# Patient Record
Sex: FEMALE | Race: WHITE | Employment: OTHER | URBAN - METROPOLITAN AREA
[De-identification: names, ages, dates, MRNs, and addresses within clinical notes are randomized per-mention and may not be internally consistent; named-entity substitution may affect disease eponyms.]

---

## 2017-05-11 ENCOUNTER — HOSPITAL ENCOUNTER (OUTPATIENT)
Age: 72
Discharge: HOME | End: 2017-05-11
Payer: MEDICARE

## 2017-05-11 ENCOUNTER — HOSPITAL ENCOUNTER (OUTPATIENT)
Dept: CT IMAGING | Age: 72
Discharge: HOME | End: 2017-05-11
Payer: MEDICARE

## 2017-05-11 DIAGNOSIS — R10.30 LOWER ABDOMINAL PAIN: ICD-10-CM

## 2017-05-11 LAB
BUN BLDV-MCNC: 20 MG/DL (ref 8–23)
CREAT SERPL-MCNC: 1.1 MG/DL (ref 0.5–0.9)
GFR AFRICAN AMERICAN: 59 ML/MIN
GFR NON-AFRICAN AMERICAN: 49 ML/MIN
GFR SERPL CREATININE-BSD FRML MDRD: ABNORMAL ML/MIN/{1.73_M2}
GFR SERPL CREATININE-BSD FRML MDRD: ABNORMAL ML/MIN/{1.73_M2}
HCT VFR BLD CALC: 34.1 % (ref 36–46)
HEMOGLOBIN: 11.1 G/DL (ref 12–16)
MCH RBC QN AUTO: 29 PG (ref 26–34)
MCHC RBC AUTO-ENTMCNC: 32.6 G/DL (ref 31–37)
MCV RBC AUTO: 89.2 FL (ref 80–100)
PDW BLD-RTO: 14.3 % (ref 11.5–14.9)
PLATELET # BLD: 216 K/UL (ref 150–450)
PMV BLD AUTO: 8 FL (ref 6–12)
RBC # BLD: 3.82 M/UL (ref 4–5.2)
WBC # BLD: 7.8 K/UL (ref 3.5–11)

## 2017-05-11 PROCEDURE — 74177 CT ABD & PELVIS W/CONTRAST: CPT

## 2017-05-11 PROCEDURE — 36415 COLL VENOUS BLD VENIPUNCTURE: CPT

## 2017-05-11 PROCEDURE — 84520 ASSAY OF UREA NITROGEN: CPT

## 2017-05-11 PROCEDURE — 82565 ASSAY OF CREATININE: CPT

## 2017-05-11 PROCEDURE — 2580000003 HC RX 258: Performed by: FAMILY MEDICINE

## 2017-05-11 PROCEDURE — 85027 COMPLETE CBC AUTOMATED: CPT

## 2017-05-11 PROCEDURE — 6360000004 HC RX CONTRAST MEDICATION: Performed by: FAMILY MEDICINE

## 2017-05-11 RX ORDER — 0.9 % SODIUM CHLORIDE 0.9 %
100 INTRAVENOUS SOLUTION INTRAVENOUS ONCE
Status: COMPLETED | OUTPATIENT
Start: 2017-05-11 | End: 2017-05-11

## 2017-05-11 RX ORDER — SODIUM CHLORIDE 0.9 % (FLUSH) 0.9 %
10 SYRINGE (ML) INJECTION PRN
Status: DISCONTINUED | OUTPATIENT
Start: 2017-05-11 | End: 2017-05-14 | Stop reason: HOSPADM

## 2017-05-11 RX ADMIN — IOHEXOL 50 ML: 240 INJECTION, SOLUTION INTRATHECAL; INTRAVASCULAR; INTRAVENOUS; ORAL at 09:50

## 2017-05-11 RX ADMIN — SODIUM CHLORIDE 100 ML: 9 INJECTION, SOLUTION INTRAVENOUS at 09:50

## 2017-05-11 RX ADMIN — Medication 10 ML: at 09:50

## 2017-05-11 RX ADMIN — IOVERSOL 130 ML: 741 INJECTION INTRA-ARTERIAL; INTRAVENOUS at 09:50

## 2017-05-26 ENCOUNTER — HOSPITAL ENCOUNTER (OUTPATIENT)
Dept: PAIN MANAGEMENT | Age: 72
Discharge: HOME | End: 2017-05-26
Payer: MEDICARE

## 2017-05-26 VITALS
OXYGEN SATURATION: 97 % | HEART RATE: 85 BPM | HEIGHT: 60 IN | WEIGHT: 121 LBS | TEMPERATURE: 98 F | SYSTOLIC BLOOD PRESSURE: 134 MMHG | BODY MASS INDEX: 23.75 KG/M2 | DIASTOLIC BLOOD PRESSURE: 62 MMHG | RESPIRATION RATE: 15 BRPM

## 2017-05-26 DIAGNOSIS — Z51.81 MEDICATION MONITORING ENCOUNTER: ICD-10-CM

## 2017-05-26 DIAGNOSIS — M47.817 LUMBOSACRAL SPONDYLOSIS WITHOUT MYELOPATHY: ICD-10-CM

## 2017-05-26 DIAGNOSIS — M48.061 SPINAL STENOSIS, LUMBAR REGION, WITHOUT NEUROGENIC CLAUDICATION: ICD-10-CM

## 2017-05-26 DIAGNOSIS — M51.37 DEGENERATION OF LUMBAR OR LUMBOSACRAL INTERVERTEBRAL DISC: Primary | ICD-10-CM

## 2017-05-26 DIAGNOSIS — M54.16 LUMBAR RADICULOPATHY, CHRONIC: ICD-10-CM

## 2017-05-26 PROCEDURE — 99213 OFFICE O/P EST LOW 20 MIN: CPT

## 2017-05-26 PROCEDURE — 99214 OFFICE O/P EST MOD 30 MIN: CPT | Performed by: PAIN MEDICINE

## 2017-05-26 RX ORDER — HYDROCODONE BITARTRATE AND ACETAMINOPHEN 5; 325 MG/1; MG/1
1 TABLET ORAL 2 TIMES DAILY PRN
Qty: 60 TABLET | Refills: 0 | Status: SHIPPED | OUTPATIENT
Start: 2017-05-26 | End: 2017-06-26 | Stop reason: SDUPTHER

## 2017-05-26 RX ORDER — NAPROXEN SODIUM 220 MG
220 TABLET ORAL 2 TIMES DAILY PRN
COMMUNITY
End: 2020-04-22 | Stop reason: ALTCHOICE

## 2017-05-26 ASSESSMENT — ENCOUNTER SYMPTOMS
BACK PAIN: 1
RESPIRATORY NEGATIVE: 1
GASTROINTESTINAL NEGATIVE: 1
EYES NEGATIVE: 1

## 2017-05-26 ASSESSMENT — PAIN DESCRIPTION - LOCATION: LOCATION: BACK;HIP;LEG

## 2017-05-26 ASSESSMENT — PAIN DESCRIPTION - DESCRIPTORS: DESCRIPTORS: ACHING;CONSTANT;DULL;JABBING;NAGGING;SHARP

## 2017-05-26 ASSESSMENT — PAIN DESCRIPTION - ORIENTATION: ORIENTATION: RIGHT;LEFT;LOWER

## 2017-05-26 ASSESSMENT — PAIN DESCRIPTION - PAIN TYPE: TYPE: CHRONIC PAIN

## 2017-05-26 ASSESSMENT — PAIN SCALES - GENERAL: PAINLEVEL_OUTOF10: 4

## 2017-05-26 ASSESSMENT — PAIN DESCRIPTION - PROGRESSION: CLINICAL_PROGRESSION: GRADUALLY WORSENING

## 2017-05-26 ASSESSMENT — PAIN DESCRIPTION - FREQUENCY: FREQUENCY: CONTINUOUS

## 2017-05-26 ASSESSMENT — PAIN DESCRIPTION - ONSET: ONSET: ON-GOING

## 2017-06-06 PROBLEM — F41.9 ANXIETY: Chronic | Status: ACTIVE | Noted: 2017-06-06

## 2017-06-06 PROBLEM — D64.9 ABSOLUTE ANEMIA: Status: ACTIVE | Noted: 2017-06-06

## 2017-06-26 ENCOUNTER — HOSPITAL ENCOUNTER (OUTPATIENT)
Dept: PAIN MANAGEMENT | Age: 72
Discharge: HOME | End: 2017-06-26
Payer: MEDICARE

## 2017-06-26 DIAGNOSIS — Z51.81 MEDICATION MONITORING ENCOUNTER: ICD-10-CM

## 2017-06-26 DIAGNOSIS — M47.817 LUMBOSACRAL SPONDYLOSIS WITHOUT MYELOPATHY: ICD-10-CM

## 2017-06-26 DIAGNOSIS — M51.37 DEGENERATION OF LUMBAR OR LUMBOSACRAL INTERVERTEBRAL DISC: Primary | ICD-10-CM

## 2017-06-26 PROCEDURE — 99213 OFFICE O/P EST LOW 20 MIN: CPT | Performed by: NURSE PRACTITIONER

## 2017-06-26 PROCEDURE — 99213 OFFICE O/P EST LOW 20 MIN: CPT

## 2017-06-26 RX ORDER — HYDROCODONE BITARTRATE AND ACETAMINOPHEN 5; 325 MG/1; MG/1
1 TABLET ORAL 2 TIMES DAILY PRN
Qty: 60 TABLET | Refills: 0 | Status: SHIPPED | OUTPATIENT
Start: 2017-06-26 | End: 2017-07-24 | Stop reason: SDUPTHER

## 2017-07-24 ENCOUNTER — HOSPITAL ENCOUNTER (OUTPATIENT)
Dept: PAIN MANAGEMENT | Age: 72
Discharge: HOME | End: 2017-07-24
Payer: MEDICARE

## 2017-07-24 VITALS
BODY MASS INDEX: 23.75 KG/M2 | OXYGEN SATURATION: 97 % | HEART RATE: 81 BPM | WEIGHT: 121 LBS | DIASTOLIC BLOOD PRESSURE: 70 MMHG | SYSTOLIC BLOOD PRESSURE: 134 MMHG | TEMPERATURE: 98.7 F | HEIGHT: 60 IN | RESPIRATION RATE: 16 BRPM

## 2017-07-24 DIAGNOSIS — M54.16 LUMBAR RADICULOPATHY, CHRONIC: ICD-10-CM

## 2017-07-24 DIAGNOSIS — Z51.81 MEDICATION MONITORING ENCOUNTER: ICD-10-CM

## 2017-07-24 DIAGNOSIS — M48.061 SPINAL STENOSIS, LUMBAR REGION, WITHOUT NEUROGENIC CLAUDICATION: ICD-10-CM

## 2017-07-24 DIAGNOSIS — M47.817 LUMBOSACRAL SPONDYLOSIS WITHOUT MYELOPATHY: ICD-10-CM

## 2017-07-24 DIAGNOSIS — M51.37 DEGENERATION OF LUMBAR OR LUMBOSACRAL INTERVERTEBRAL DISC: Primary | ICD-10-CM

## 2017-07-24 PROCEDURE — 99213 OFFICE O/P EST LOW 20 MIN: CPT

## 2017-07-24 PROCEDURE — 99213 OFFICE O/P EST LOW 20 MIN: CPT | Performed by: NURSE PRACTITIONER

## 2017-07-24 RX ORDER — HYDROCODONE BITARTRATE AND ACETAMINOPHEN 5; 325 MG/1; MG/1
1 TABLET ORAL 2 TIMES DAILY PRN
Qty: 60 TABLET | Refills: 0 | Status: SHIPPED | OUTPATIENT
Start: 2017-07-26 | End: 2017-08-21 | Stop reason: SDUPTHER

## 2017-07-24 ASSESSMENT — ENCOUNTER SYMPTOMS
BACK PAIN: 1
ROS SKIN COMMENTS: BRUISING ARMS
EYES NEGATIVE: 1
GASTROINTESTINAL NEGATIVE: 1

## 2017-08-21 ENCOUNTER — HOSPITAL ENCOUNTER (OUTPATIENT)
Dept: PAIN MANAGEMENT | Age: 72
Discharge: HOME | End: 2017-08-21
Payer: MEDICARE

## 2017-08-21 VITALS
HEART RATE: 79 BPM | RESPIRATION RATE: 16 BRPM | TEMPERATURE: 98.5 F | OXYGEN SATURATION: 98 % | BODY MASS INDEX: 23.75 KG/M2 | HEIGHT: 60 IN | WEIGHT: 121 LBS | SYSTOLIC BLOOD PRESSURE: 129 MMHG | DIASTOLIC BLOOD PRESSURE: 67 MMHG

## 2017-08-21 DIAGNOSIS — M54.16 LUMBAR RADICULOPATHY, CHRONIC: ICD-10-CM

## 2017-08-21 DIAGNOSIS — M48.061 SPINAL STENOSIS, LUMBAR REGION, WITHOUT NEUROGENIC CLAUDICATION: ICD-10-CM

## 2017-08-21 DIAGNOSIS — M51.37 DEGENERATION OF LUMBAR OR LUMBOSACRAL INTERVERTEBRAL DISC: Primary | ICD-10-CM

## 2017-08-21 DIAGNOSIS — M47.817 LUMBOSACRAL SPONDYLOSIS WITHOUT MYELOPATHY: ICD-10-CM

## 2017-08-21 DIAGNOSIS — Z51.81 MEDICATION MONITORING ENCOUNTER: ICD-10-CM

## 2017-08-21 PROCEDURE — 99213 OFFICE O/P EST LOW 20 MIN: CPT

## 2017-08-21 PROCEDURE — 99213 OFFICE O/P EST LOW 20 MIN: CPT | Performed by: NURSE PRACTITIONER

## 2017-08-21 RX ORDER — HYDROCODONE BITARTRATE AND ACETAMINOPHEN 5; 325 MG/1; MG/1
1 TABLET ORAL 2 TIMES DAILY PRN
Qty: 60 TABLET | Refills: 0 | Status: SHIPPED | OUTPATIENT
Start: 2017-08-25 | End: 2017-09-22 | Stop reason: SDUPTHER

## 2017-08-21 ASSESSMENT — ENCOUNTER SYMPTOMS
EYES NEGATIVE: 1
GASTROINTESTINAL NEGATIVE: 1
BACK PAIN: 1

## 2017-09-22 ENCOUNTER — HOSPITAL ENCOUNTER (OUTPATIENT)
Dept: PAIN MANAGEMENT | Age: 72
Discharge: HOME | End: 2017-09-22
Payer: MEDICARE

## 2017-09-22 VITALS
DIASTOLIC BLOOD PRESSURE: 58 MMHG | OXYGEN SATURATION: 100 % | WEIGHT: 121 LBS | TEMPERATURE: 97.9 F | RESPIRATION RATE: 16 BRPM | BODY MASS INDEX: 23.75 KG/M2 | HEIGHT: 60 IN | SYSTOLIC BLOOD PRESSURE: 133 MMHG | HEART RATE: 77 BPM

## 2017-09-22 DIAGNOSIS — M51.37 DEGENERATION OF LUMBAR OR LUMBOSACRAL INTERVERTEBRAL DISC: Primary | ICD-10-CM

## 2017-09-22 DIAGNOSIS — M47.817 LUMBOSACRAL SPONDYLOSIS WITHOUT MYELOPATHY: ICD-10-CM

## 2017-09-22 DIAGNOSIS — Z51.81 MEDICATION MONITORING ENCOUNTER: ICD-10-CM

## 2017-09-22 DIAGNOSIS — M48.061 SPINAL STENOSIS, LUMBAR REGION, WITHOUT NEUROGENIC CLAUDICATION: ICD-10-CM

## 2017-09-22 DIAGNOSIS — M54.16 LUMBAR RADICULOPATHY, CHRONIC: ICD-10-CM

## 2017-09-22 PROCEDURE — 80307 DRUG TEST PRSMV CHEM ANLYZR: CPT

## 2017-09-22 PROCEDURE — 99213 OFFICE O/P EST LOW 20 MIN: CPT

## 2017-09-22 PROCEDURE — 99213 OFFICE O/P EST LOW 20 MIN: CPT | Performed by: NURSE PRACTITIONER

## 2017-09-22 RX ORDER — HYDROCODONE BITARTRATE AND ACETAMINOPHEN 5; 325 MG/1; MG/1
1 TABLET ORAL 2 TIMES DAILY PRN
Qty: 60 TABLET | Refills: 0 | Status: SHIPPED | OUTPATIENT
Start: 2017-09-24 | End: 2017-10-18 | Stop reason: SDUPTHER

## 2017-09-22 ASSESSMENT — ENCOUNTER SYMPTOMS
BACK PAIN: 1
GASTROINTESTINAL NEGATIVE: 1
EYES NEGATIVE: 1

## 2017-09-25 LAB
6-ACETYLMORPHINE, UR: NOT DETECTED
7-AMINOCLONAZEPAM, URINE: NOT DETECTED
ALPHA-OH-ALPRAZ, URINE: NOT DETECTED
ALPRAZOLAM, URINE: NOT DETECTED
AMPHETAMINES, URINE: NOT DETECTED
BARBITURATES, URINE: NOT DETECTED
BENZOYLECGONINE, UR: NOT DETECTED
BUPRENORPHINE URINE: NOT DETECTED
CARISOPRODOL, UR: NOT DETECTED
CLONAZEPAM, URINE: NOT DETECTED
CODEINE, URINE: NOT DETECTED
CREATININE URINE: 41.4 MG/DL (ref 20–400)
DIAZEPAM, URINE: NOT DETECTED
DRUGS EXPECTED, UR: NORMAL
EER HI RES INTERP UR: NORMAL
ETHYL GLUCURONIDE UR: NOT DETECTED
FENTANYL URINE: NOT DETECTED
HYDROCODONE, URINE: NOT DETECTED
HYDROMORPHONE, URINE: NOT DETECTED
LORAZEPAM, URINE: NOT DETECTED
MARIJUANA METAB, UR: NOT DETECTED
MDA, UR: NOT DETECTED
MDEA, EVE, UR: NOT DETECTED
MDMA URINE: NOT DETECTED
MEPERIDINE METAB, UR: NOT DETECTED
METHADONE, URINE: NOT DETECTED
METHAMPHETAMINE, URINE: NOT DETECTED
METHYLPHENIDATE: NOT DETECTED
MIDAZOLAM, URINE: NOT DETECTED
MORPHINE URINE: NOT DETECTED
NORBUPRENORPHINE, URINE: NOT DETECTED
NORDIAZEPAM, URINE: NOT DETECTED
NORFENTANYL, URINE: NOT DETECTED
NORHYDROCODONE, URINE: NOT DETECTED
NOROXYCODONE, URINE: NOT DETECTED
NOROXYMORPHONE, URINE: NOT DETECTED
OXAZEPAM, URINE: NOT DETECTED
OXYCODONE URINE: NOT DETECTED
OXYMORPHONE, URINE: NOT DETECTED
PAIN MANAGEMENT DRUG PANEL INTERP, URINE: NORMAL
PAIN MGT DRUG PANEL, HI RES, UR: NORMAL
PCP,URINE: NOT DETECTED
PHENTERMINE, UR: NOT DETECTED
PROPOXYPHENE, URINE: NOT DETECTED
TAPENTADOL, URINE: NOT DETECTED
TAPENTADOL-O-SULFATE, URINE: NOT DETECTED
TEMAZEPAM, URINE: NOT DETECTED
TRAMADOL, URINE: NOT DETECTED
ZOLPIDEM, URINE: NOT DETECTED

## 2017-10-12 ENCOUNTER — HOSPITAL ENCOUNTER (OUTPATIENT)
Dept: VASCULAR LAB | Age: 72
Discharge: HOME | End: 2017-10-12
Payer: MEDICARE

## 2017-10-12 ENCOUNTER — HOSPITAL ENCOUNTER (OUTPATIENT)
Dept: WOMENS IMAGING | Age: 72
Discharge: HOME | End: 2017-10-12
Payer: MEDICARE

## 2017-10-12 DIAGNOSIS — R09.89 RIGHT CAROTID BRUIT: ICD-10-CM

## 2017-10-12 DIAGNOSIS — Z13.820 OSTEOPOROSIS SCREENING: ICD-10-CM

## 2017-10-12 PROCEDURE — 77080 DXA BONE DENSITY AXIAL: CPT

## 2017-10-12 PROCEDURE — 93880 EXTRACRANIAL BILAT STUDY: CPT

## 2017-10-18 ENCOUNTER — TELEPHONE (OUTPATIENT)
Dept: PAIN MANAGEMENT | Age: 72
End: 2017-10-18

## 2017-10-18 ENCOUNTER — HOSPITAL ENCOUNTER (OUTPATIENT)
Dept: PAIN MANAGEMENT | Age: 72
Discharge: HOME | End: 2017-10-18
Payer: MEDICARE

## 2017-10-18 VITALS
BODY MASS INDEX: 23.36 KG/M2 | OXYGEN SATURATION: 100 % | DIASTOLIC BLOOD PRESSURE: 64 MMHG | HEART RATE: 76 BPM | HEIGHT: 60 IN | RESPIRATION RATE: 16 BRPM | SYSTOLIC BLOOD PRESSURE: 130 MMHG | WEIGHT: 119 LBS | TEMPERATURE: 98.3 F

## 2017-10-18 DIAGNOSIS — Z51.81 MEDICATION MONITORING ENCOUNTER: ICD-10-CM

## 2017-10-18 DIAGNOSIS — M47.817 LUMBOSACRAL SPONDYLOSIS WITHOUT MYELOPATHY: ICD-10-CM

## 2017-10-18 DIAGNOSIS — M51.37 DEGENERATION OF LUMBAR OR LUMBOSACRAL INTERVERTEBRAL DISC: Primary | ICD-10-CM

## 2017-10-18 DIAGNOSIS — M54.16 LUMBAR RADICULOPATHY, CHRONIC: ICD-10-CM

## 2017-10-18 DIAGNOSIS — M48.061 SPINAL STENOSIS, LUMBAR REGION, WITHOUT NEUROGENIC CLAUDICATION: ICD-10-CM

## 2017-10-18 PROCEDURE — 99213 OFFICE O/P EST LOW 20 MIN: CPT | Performed by: NURSE PRACTITIONER

## 2017-10-18 PROCEDURE — 99213 OFFICE O/P EST LOW 20 MIN: CPT

## 2017-10-18 RX ORDER — HYDROCODONE BITARTRATE AND ACETAMINOPHEN 5; 325 MG/1; MG/1
1 TABLET ORAL 2 TIMES DAILY PRN
Qty: 60 TABLET | Refills: 0 | Status: SHIPPED | OUTPATIENT
Start: 2017-10-24 | End: 2017-10-18 | Stop reason: SDUPTHER

## 2017-10-18 RX ORDER — HYDROCODONE BITARTRATE AND ACETAMINOPHEN 5; 325 MG/1; MG/1
1 TABLET ORAL 2 TIMES DAILY PRN
Qty: 60 TABLET | Refills: 0 | Status: SHIPPED | OUTPATIENT
Start: 2017-10-24 | End: 2018-04-25 | Stop reason: SDUPTHER

## 2017-10-18 ASSESSMENT — ENCOUNTER SYMPTOMS
EYES NEGATIVE: 1
BACK PAIN: 1
RESPIRATORY NEGATIVE: 1
GASTROINTESTINAL NEGATIVE: 1

## 2017-10-18 NOTE — PROGRESS NOTES
COLONOSCOPY  10/13/2016    Dr Keri Carvajal         No Known Allergies      Current Outpatient Prescriptions:     [START ON 10/24/2017] HYDROcodone-acetaminophen (NORCO) 5-325 MG per tablet, Take 1 tablet by mouth 2 times daily as needed for Pain ., Disp: 60 tablet, Rfl: 0    buPROPion (WELLBUTRIN XL) 300 MG extended release tablet, Take 1 tablet by mouth every morning, Disp: 90 tablet, Rfl: 1    citalopram (CELEXA) 20 MG tablet, Take 1 tablet by mouth nightly, Disp: 90 tablet, Rfl: 1    psyllium 0.52 G capsule, Take 0.52 g by mouth daily, Disp: , Rfl:     Multiple Vitamins-Minerals (MULTIVITAMIN ADULT PO), Take by mouth, Disp: , Rfl:     Calcium Carb-Cholecalciferol (CALCIUM + D3) 600-200 MG-UNIT TABS tablet, Take 1 tablet by mouth Daily with lunch, Disp: , Rfl:     naproxen sodium (ALEVE) 220 MG tablet, Take 220 mg by mouth 2 times daily as needed for Pain, Disp: , Rfl:     Family History   Problem Relation Age of Onset    Heart Disease Father        Social History     Social History    Marital status:      Spouse name: N/A    Number of children: 2    Years of education: 15     Occupational History    retired      Social History Main Topics    Smoking status: Current Every Day Smoker     Packs/day: 0.50     Years: 30.00    Smokeless tobacco: Former User     Quit date: 5/1/1999    Alcohol use Yes      Comment: occ. wine or rum.  Drug use: No    Sexual activity: Not on file     Other Topics Concern    Not on file     Social History Narrative    No narrative on file       Review of Systems:  Review of Systems   Constitution: Negative. Eyes: Negative. Cardiovascular: Negative. Respiratory: Negative. Endocrine: Negative. Skin: Negative. Musculoskeletal: Positive for back pain and joint pain. Gastrointestinal: Negative. Genitourinary: Negative. Neurological: Negative. Psychiatric/Behavioral: The patient is nervous/anxious.           Physical Exam:  BP 130/64   Pulse 76   Temp 98.3 °F (36.8 °C) (Oral)   Resp 16   Ht 5' (1.524 m)   Wt 119 lb (54 kg)   SpO2 100%   BMI 23.24 kg/m²     Physical Exam   Constitutional: She is oriented to person, place, and time and well-developed, well-nourished, and in no distress. HENT:   Head: Normocephalic. Neck: Normal range of motion. Neck supple. Pulmonary/Chest: Effort normal.   Musculoskeletal:        Right shoulder: She exhibits decreased range of motion and tenderness. Neurological: She is alert and oriented to person, place, and time. She has normal reflexes. Gait normal.   Skin: Skin is warm and dry. Psychiatric: Affect normal.       Record/Diagnostics Review:    As above, I did review the imaging  EXAMINATION:   BONE DENSITOMETRY       10/12/2017 8:08 am       TECHNIQUE:   A bone density dual x-ray absorptiometry (DXA) scan was performed of the   lumbar spine and left hip on a [employer] system.       COMPARISON:   08/29/2005       HISTORY:   ORDERING SYSTEM PROVIDED HISTORY: Osteoporosis screening       Postmenopausal, tobacco user       FINDINGS:   FINDINGS:       Lumbar spine:           L1-L4 BMD: 1.063 g/cm2, young adult/T.-score: -1.1, age-matched Z.-score: 1.0. There has been 12.5% decrease in the fluid lumbar BMD since the prior study.               Left hip:           Total: 0.801 g/cm2, young adult/T.-score: -1.7, age-matched Z.-score: 0.2. Neck: 0.772 g/cm2, young adult/T.-score: -1.9, age-matched Z.-score: 0.1. There has been 18% decrease in left hip total BMD since the prior study.           Impression   According to the [facility]) classification:       Osteopenia.  Fracture risk is moderate.  Treatment is advised.       RECOMMENDATIONS:       National Osteoporosis Foundation (NOF) guidelines recommend treatment for   patients with a T score of -1.5 and below with risk factors or -2.0 and below   without risk factors.  The selective therapies are available in the form of  9:30 AM [facility]   NORHYDROCODONE, URINE Not Detected   Final 09/22/2017  9:30 AM [facility]   Noroxycodone, Urine Not Detected   Final 09/22/2017  9:30 AM [facility]   NOROXYMORPHONE, URINE Not Detected   Final 09/22/2017  9:30 AM [facility]   Oxazepam, Urine Not Detected   Final 09/22/2017  9:30 AM [facility]   Oxycodone Urine Not Detected   Final 09/22/2017  9:30 AM [facility]   Oxymorphone, Urine Not Detected   Final 09/22/2017  9:30 AM [facility]   PCP, Urine Not Detected   Final 09/22/2017  9:30 AM [facility]   Phentermine, Ur Not Detected   Final 09/22/2017  9:30 AM [facility]   Propoxyphene, Urine Not Detected   Final 09/22/2017  9:30 AM [facility]   Tapentadol-O-Sulfate, Urine Not Detected   Final 09/22/2017  9:30 AM [facility]   Tapentadol, Urine Not Detected   Final 09/22/2017  9:30 AM [facility]   Temazepam, Urine Not Detected   Final 09/22/2017  9:30 AM [facility]   Tramadol, Urine Not Detected   Final 09/22/2017  9:30 AM [facility]   Zolpidem, Urine Not Detected   Final 09/22/2017  9:30 AM [facility]   Drugs Expected, Ur   Final 09/22/2017  9:30 AM [facility]   HYDROCODONE ON 9/21/17 AT 10AM    Creatinine, Ur 41.4  20.0 - 400.0 mg/dL Final 09/22/2017  9:30 AM [facility]   Pain Mgt Drug Panel, Hi Res, Ur See Below   Final 09/22/2017  9:30 AM [facility]   (NOTE)   Methodology: Qualitative Enzyme Immunoassay and Qualitative Liquid   Chromatography-Time of Flight-Mass Spectrometry or Tandem Mass   Spectrometry, Quantitative Spectrophotometry   The absence of expected drug(s) and/or drug metabolite(s) may   indicate non-compliance, inappropriate timing of specimen   collection relative to drug administration, poor drug absorption,   diluted/adulterated urine, or limitations of testing. The   concentration must be greater than or equal to the cutoff to be   reported as present.  If specific drug concentrations are   required, contact the laboratory within two weeks of specimen   collection to request quantification by a second analytical   technique. Interpretive questions should be directed to the   laboratory. Results based on immunoassay detection that do not match clinical   expectations should be   interpreted with caution. Confirmatory testing by mass   spectrometry for immunoassay-based results is available, if   ordered within two weeks of specimen collection. Additional   charges apply. For medical purposes only; not valid for forensic use. This test was developed and its performance characteristics   determined by [facility]. The U.S. Food and Drug   Administration has not approved or cleared this test; however, FDA   clearance or approval is not currently required for clinical use. The results are not intended to be used as the sole means for   clinical diagnosis or patient management decisions. EER Hi Res Interp Ur See Note   Final 09/22/2017  9:30 AM - [facility]   (NOTE)   Access [facility] Enhanced Report using either link below:        Assessment:    Problem List Items Addressed This Visit     Degeneration of lumbar or lumbosacral intervertebral disc - Primary (Chronic)    Relevant Medications    HYDROcodone-acetaminophen ([address]) 5-325 MG per tablet (Start on 10/24/2017)    Lumbosacral spondylosis without myelopathy (Chronic)    Relevant Medications    HYDROcodone-acetaminophen (NORCO) 5-325 MG per tablet (Start on 10/24/2017)    Spinal stenosis, lumbar region, without neurogenic claudication (Chronic)    Medication monitoring encounter    Lumbar radiculopathy, chronic      Other Visit Diagnoses    None.          Treatment Plan:  DISCUSSION: Treatment

## 2017-10-18 NOTE — TELEPHONE ENCOUNTER
Retrieved voice message left by Tramaine Bryant, pharmacist. She relates patient reports she is leaving on vacation et wants to fill script today. Will ask E Aidan to review same. Juwan MOORE  [phone] Per Hemalatha Lebron, pharmacist may fill script today. [address] with Tramaine Bryant at [employer] advised her of same. She will prepare scriptfor pt .  [email or web address] RN

## 2018-04-25 ENCOUNTER — HOSPITAL ENCOUNTER (OUTPATIENT)
Dept: PAIN MANAGEMENT | Age: 73
Discharge: HOME | End: 2018-04-25
Payer: MEDICARE

## 2018-04-25 VITALS
SYSTOLIC BLOOD PRESSURE: 155 MMHG | OXYGEN SATURATION: 100 % | WEIGHT: 118 LBS | HEART RATE: 80 BPM | BODY MASS INDEX: 23.16 KG/M2 | DIASTOLIC BLOOD PRESSURE: 80 MMHG | RESPIRATION RATE: 16 BRPM | TEMPERATURE: 98.9 F | HEIGHT: 60 IN

## 2018-04-25 DIAGNOSIS — M48.061 SPINAL STENOSIS, LUMBAR REGION, WITHOUT NEUROGENIC CLAUDICATION: ICD-10-CM

## 2018-04-25 DIAGNOSIS — M54.16 LUMBAR RADICULOPATHY, CHRONIC: ICD-10-CM

## 2018-04-25 DIAGNOSIS — M47.817 LUMBOSACRAL SPONDYLOSIS WITHOUT MYELOPATHY: ICD-10-CM

## 2018-04-25 DIAGNOSIS — M51.37 DEGENERATION OF LUMBAR OR LUMBOSACRAL INTERVERTEBRAL DISC: Primary | ICD-10-CM

## 2018-04-25 DIAGNOSIS — Z51.81 MEDICATION MONITORING ENCOUNTER: ICD-10-CM

## 2018-04-25 PROCEDURE — 99214 OFFICE O/P EST MOD 30 MIN: CPT | Performed by: PAIN MEDICINE

## 2018-04-25 PROCEDURE — 99213 OFFICE O/P EST LOW 20 MIN: CPT

## 2018-04-25 PROCEDURE — 80307 DRUG TEST PRSMV CHEM ANLYZR: CPT

## 2018-04-25 RX ORDER — HYDROCODONE BITARTRATE AND ACETAMINOPHEN 5; 325 MG/1; MG/1
1 TABLET ORAL 2 TIMES DAILY PRN
Qty: 60 TABLET | Refills: 0 | Status: SHIPPED | OUTPATIENT
Start: 2018-04-25 | End: 2018-05-22 | Stop reason: SDUPTHER

## 2018-04-25 RX ORDER — HYDROCODONE BITARTRATE AND ACETAMINOPHEN 5; 325 MG/1; MG/1
1 TABLET ORAL 2 TIMES DAILY PRN
Qty: 60 TABLET | Refills: 0 | Status: SHIPPED | OUTPATIENT
Start: 2018-04-25 | End: 2018-04-25 | Stop reason: SDUPTHER

## 2018-04-25 ASSESSMENT — ENCOUNTER SYMPTOMS
RESPIRATORY NEGATIVE: 1
SINUS PAIN: 0
SHORTNESS OF BREATH: 0
NAUSEA: 0
HEARTBURN: 0
SORE THROAT: 0
COUGH: 0
BLURRED VISION: 0
GASTROINTESTINAL NEGATIVE: 1
EYES NEGATIVE: 1
VOMITING: 0
BACK PAIN: 1
CONSTIPATION: 0

## 2018-04-25 ASSESSMENT — PAIN DESCRIPTION - ONSET: ONSET: ON-GOING

## 2018-04-25 ASSESSMENT — PAIN DESCRIPTION - DESCRIPTORS: DESCRIPTORS: ACHING;SHARP;SHOOTING

## 2018-04-25 ASSESSMENT — PAIN DESCRIPTION - PROGRESSION: CLINICAL_PROGRESSION: GRADUALLY WORSENING

## 2018-04-25 ASSESSMENT — PAIN DESCRIPTION - LOCATION: LOCATION: BACK;LEG

## 2018-04-25 ASSESSMENT — PAIN DESCRIPTION - PAIN TYPE: TYPE: CHRONIC PAIN

## 2018-04-25 ASSESSMENT — PAIN DESCRIPTION - ORIENTATION: ORIENTATION: LEFT;LOWER

## 2018-04-25 ASSESSMENT — PAIN DESCRIPTION - FREQUENCY: FREQUENCY: INTERMITTENT

## 2018-04-26 ASSESSMENT — ENCOUNTER SYMPTOMS: PHOTOPHOBIA: 0

## 2018-04-29 LAB
6-ACETYLMORPHINE, UR: NOT DETECTED
7-AMINOCLONAZEPAM, URINE: NOT DETECTED
ALPHA-OH-ALPRAZ, URINE: NOT DETECTED
ALPRAZOLAM, URINE: NOT DETECTED
AMPHETAMINES, URINE: NOT DETECTED
BARBITURATES, URINE: NOT DETECTED
BENZOYLECGONINE, UR: NOT DETECTED
BUPRENORPHINE URINE: NOT DETECTED
CARISOPRODOL, UR: NOT DETECTED
CLONAZEPAM, URINE: NOT DETECTED
CODEINE, URINE: NOT DETECTED
CREATININE URINE: 187.2 MG/DL (ref 20–400)
DIAZEPAM, URINE: NOT DETECTED
DRUGS EXPECTED, UR: NORMAL
EER HI RES INTERP UR: NORMAL
ETHYL GLUCURONIDE UR: NOT DETECTED
FENTANYL URINE: NOT DETECTED
HYDROCODONE, URINE: PRESENT
HYDROMORPHONE, URINE: NOT DETECTED
LORAZEPAM, URINE: NOT DETECTED
MARIJUANA METAB, UR: NOT DETECTED
MDA, UR: NOT DETECTED
MDEA, EVE, UR: NOT DETECTED
MDMA URINE: NOT DETECTED
MEPERIDINE METAB, UR: NOT DETECTED
METHADONE, URINE: NOT DETECTED
METHAMPHETAMINE, URINE: NOT DETECTED
METHYLPHENIDATE: NOT DETECTED
MIDAZOLAM, URINE: NOT DETECTED
MORPHINE URINE: NOT DETECTED
NORBUPRENORPHINE, URINE: NOT DETECTED
NORDIAZEPAM, URINE: NOT DETECTED
NORFENTANYL, URINE: NOT DETECTED
NORHYDROCODONE, URINE: PRESENT
NOROXYCODONE, URINE: NOT DETECTED
NOROXYMORPHONE, URINE: NOT DETECTED
OXAZEPAM, URINE: NOT DETECTED
OXYCODONE URINE: NOT DETECTED
OXYMORPHONE, URINE: NOT DETECTED
PAIN MANAGEMENT DRUG PANEL INTERP, URINE: NORMAL
PAIN MGT DRUG PANEL, HI RES, UR: NORMAL
PCP,URINE: NOT DETECTED
PHENTERMINE, UR: NOT DETECTED
PROPOXYPHENE, URINE: NOT DETECTED
TAPENTADOL, URINE: NOT DETECTED
TAPENTADOL-O-SULFATE, URINE: NOT DETECTED
TEMAZEPAM, URINE: NOT DETECTED
TRAMADOL, URINE: NOT DETECTED
ZOLPIDEM, URINE: NOT DETECTED

## 2018-05-22 ENCOUNTER — HOSPITAL ENCOUNTER (OUTPATIENT)
Dept: PAIN MANAGEMENT | Age: 73
Discharge: HOME | End: 2018-05-22
Payer: MEDICARE

## 2018-05-22 VITALS
SYSTOLIC BLOOD PRESSURE: 143 MMHG | WEIGHT: 118 LBS | HEART RATE: 84 BPM | HEIGHT: 60 IN | RESPIRATION RATE: 16 BRPM | TEMPERATURE: 98 F | BODY MASS INDEX: 23.16 KG/M2 | DIASTOLIC BLOOD PRESSURE: 75 MMHG

## 2018-05-22 DIAGNOSIS — M47.817 LUMBOSACRAL SPONDYLOSIS WITHOUT MYELOPATHY: ICD-10-CM

## 2018-05-22 DIAGNOSIS — Z51.81 MEDICATION MONITORING ENCOUNTER: ICD-10-CM

## 2018-05-22 DIAGNOSIS — M51.37 DEGENERATION OF LUMBAR OR LUMBOSACRAL INTERVERTEBRAL DISC: Primary | ICD-10-CM

## 2018-05-22 DIAGNOSIS — M48.061 SPINAL STENOSIS, LUMBAR REGION, WITHOUT NEUROGENIC CLAUDICATION: ICD-10-CM

## 2018-05-22 DIAGNOSIS — M54.16 LUMBAR RADICULOPATHY, CHRONIC: ICD-10-CM

## 2018-05-22 PROCEDURE — 99213 OFFICE O/P EST LOW 20 MIN: CPT

## 2018-05-22 PROCEDURE — 99213 OFFICE O/P EST LOW 20 MIN: CPT | Performed by: NURSE PRACTITIONER

## 2018-05-22 RX ORDER — HYDROCODONE BITARTRATE AND ACETAMINOPHEN 5; 325 MG/1; MG/1
1 TABLET ORAL 2 TIMES DAILY PRN
Qty: 60 TABLET | Refills: 0 | Status: SHIPPED | OUTPATIENT
Start: 2018-05-25 | End: 2018-06-19 | Stop reason: SDUPTHER

## 2018-05-22 ASSESSMENT — ENCOUNTER SYMPTOMS
EYES NEGATIVE: 1
GASTROINTESTINAL NEGATIVE: 1
RESPIRATORY NEGATIVE: 1
BACK PAIN: 1

## 2018-06-19 ENCOUNTER — HOSPITAL ENCOUNTER (OUTPATIENT)
Dept: PAIN MANAGEMENT | Age: 73
Discharge: HOME | End: 2018-06-19
Payer: MEDICARE

## 2018-06-19 VITALS
TEMPERATURE: 97.5 F | SYSTOLIC BLOOD PRESSURE: 114 MMHG | HEART RATE: 83 BPM | HEIGHT: 60 IN | BODY MASS INDEX: 23.16 KG/M2 | RESPIRATION RATE: 16 BRPM | OXYGEN SATURATION: 98 % | WEIGHT: 118 LBS | DIASTOLIC BLOOD PRESSURE: 55 MMHG

## 2018-06-19 DIAGNOSIS — M51.37 DEGENERATION OF LUMBAR OR LUMBOSACRAL INTERVERTEBRAL DISC: Primary | ICD-10-CM

## 2018-06-19 DIAGNOSIS — M47.817 LUMBOSACRAL SPONDYLOSIS WITHOUT MYELOPATHY: ICD-10-CM

## 2018-06-19 DIAGNOSIS — Z51.81 MEDICATION MONITORING ENCOUNTER: ICD-10-CM

## 2018-06-19 DIAGNOSIS — M48.061 SPINAL STENOSIS, LUMBAR REGION, WITHOUT NEUROGENIC CLAUDICATION: ICD-10-CM

## 2018-06-19 DIAGNOSIS — M54.16 LUMBAR RADICULOPATHY, CHRONIC: ICD-10-CM

## 2018-06-19 PROCEDURE — 99213 OFFICE O/P EST LOW 20 MIN: CPT | Performed by: NURSE PRACTITIONER

## 2018-06-19 PROCEDURE — 99213 OFFICE O/P EST LOW 20 MIN: CPT

## 2018-06-19 RX ORDER — HYDROCODONE BITARTRATE AND ACETAMINOPHEN 5; 325 MG/1; MG/1
1 TABLET ORAL 2 TIMES DAILY PRN
Qty: 60 TABLET | Refills: 0 | Status: SHIPPED | OUTPATIENT
Start: 2018-06-21 | End: 2018-07-17 | Stop reason: SDUPTHER

## 2018-06-19 ASSESSMENT — ENCOUNTER SYMPTOMS
BACK PAIN: 1
GASTROINTESTINAL NEGATIVE: 1
EYES NEGATIVE: 1

## 2018-07-17 ENCOUNTER — HOSPITAL ENCOUNTER (OUTPATIENT)
Dept: PAIN MANAGEMENT | Age: 73
Discharge: HOME | End: 2018-07-17
Payer: MEDICARE

## 2018-07-17 VITALS
BODY MASS INDEX: 23.16 KG/M2 | RESPIRATION RATE: 16 BRPM | HEART RATE: 90 BPM | WEIGHT: 118 LBS | SYSTOLIC BLOOD PRESSURE: 119 MMHG | HEIGHT: 60 IN | DIASTOLIC BLOOD PRESSURE: 59 MMHG | TEMPERATURE: 98.5 F | OXYGEN SATURATION: 99 %

## 2018-07-17 DIAGNOSIS — M54.16 LUMBAR RADICULOPATHY, CHRONIC: ICD-10-CM

## 2018-07-17 DIAGNOSIS — M51.37 DEGENERATION OF LUMBAR OR LUMBOSACRAL INTERVERTEBRAL DISC: Primary | ICD-10-CM

## 2018-07-17 DIAGNOSIS — M47.817 LUMBOSACRAL SPONDYLOSIS WITHOUT MYELOPATHY: ICD-10-CM

## 2018-07-17 DIAGNOSIS — Z51.81 MEDICATION MONITORING ENCOUNTER: ICD-10-CM

## 2018-07-17 DIAGNOSIS — M48.061 SPINAL STENOSIS, LUMBAR REGION, WITHOUT NEUROGENIC CLAUDICATION: ICD-10-CM

## 2018-07-17 PROCEDURE — 99213 OFFICE O/P EST LOW 20 MIN: CPT

## 2018-07-17 PROCEDURE — 99213 OFFICE O/P EST LOW 20 MIN: CPT | Performed by: NURSE PRACTITIONER

## 2018-07-17 RX ORDER — HYDROCODONE BITARTRATE AND ACETAMINOPHEN 5; 325 MG/1; MG/1
1 TABLET ORAL 2 TIMES DAILY PRN
Qty: 60 TABLET | Refills: 0 | Status: SHIPPED | OUTPATIENT
Start: 2018-07-19 | End: 2018-08-23 | Stop reason: SDUPTHER

## 2018-07-17 ASSESSMENT — ENCOUNTER SYMPTOMS
BACK PAIN: 1
GASTROINTESTINAL NEGATIVE: 1
EYES NEGATIVE: 1

## 2018-07-17 NOTE — PROGRESS NOTES
imaging    4/29/2018  6:41 PM - Naseem, [facility] Incoming Lab Results From [employer]     Component Results     Component Value Ref Range & Units Status Collected Lab   Pain Management Drug Panel Interp, Urine Consistent   Final 04/25/2018  9:45 AM [facility]   (NOTE)   ________________________________________________________________   DRUGS EXPECTED:   Sharlotte Eis (HYDROCODONE) [4/25/18]   ________________________________________________________________   CONSISTENT with medications provided:   Sharlotte Eis (HYDROCODONE) : based on hydrocodone, norhydrocodone   ________________________________________________________________   Drugs Not Included in this Assay:   Acetaminophen   ________________________________________________________________   INTERPRETIVE INFORMATION: Pain Mgt [place], Mass Spec/EMIT, Ur,                            Interp   Interpretation depends on accuracy and completeness of patient   medication information submitted by client. 6-Acetylmorphine, Ur Not Detected   Final 04/25/2018  9:45 AM [facility]   7-Aminoclonazepam, Urine Not Detected   Final 04/25/2018  9:45 AM [facility]   Alpha-OH-Alpraz, Urine Not Detected   Final 04/25/2018  9:45 AM [facility]   Alprazolam, Urine Not Detected   Final 04/25/2018  9:45 AM [facility]   Amphetamines, urine Not Detected   Final 04/25/2018  9:45 AM [facility]   Barbiturates, Ur Not Detected   Final 04/25/2018  9:45 AM [facility]   Benzoylecgonine, Ur Not Detected   Final 04/25/2018  9:45 AM [facility]   Buprenorphine Urine Not Detected   Final 04/25/2018  9:45 AM [facility]   Carisoprodol, Ur Not Detected   Final 04/25/2018  9:45 AM [facility]   (NOTE)   The carisoprodol immunoassay has cross-reactivity to carisoprodol   and meprobamate.     Clonazepam, Urine Not Detected   Final 04/25/2018  9:45 AM [facility]   Codeine, Urine Not Detected   Final 04/25/2018  9:45 AM [facility]   MDA, Ur Not Detected   Final 04/25/2018  9:45 AM [facility]   Diazepam, Urine Not Detected   Final 04/25/2018  9:45 AM [facility]   Ethyl Glucuronide Ur Not Detected   Final 04/25/2018 Detected   Final 04/25/2018  9:45 AM [facility]   Drugs Expected, Ur [address] 4/25   Final 04/25/2018  9:45 AM [facility]   Creatinine, Ur 187.2  20.0 - 400.0 mg/dL Final 04/25/2018  9:45 AM [facility]   Pain Mgt Drug Panel, Hi Res, Ur See Below   Final 04/25/2018  9:45 AM [facility]   (NOTE)   Methodology: Qualitative Enzyme Immunoassay and Qualitative Liquid   Chromatography-Time of Flight-Mass Spectrometry or Tandem Mass   Spectrometry, Quantitative Spectrophotometry   The absence of expected drug(s) and/or drug metabolite(s) may   indicate non-compliance, inappropriate timing of specimen   collection relative to drug administration, poor drug absorption,   diluted/adulterated urine, or limitations of testing. The   concentration must be greater than or equal to the cutoff to be   reported as present.  If specific drug concentrations are   required, contact the laboratory within two weeks of specimen   collection to request quantification by a second analytical   technique. Interpretive questions should be directed to the   laboratory. Results based on immunoassay detection that do not match clinical   expectations should be   interpreted with caution. Confirmatory testing by mass   spectrometry for immunoassay-based results is available, if   ordered within two weeks of specimen collection. Additional   charges apply. For medical purposes only; not valid for forensic use. This test was developed and its performance characteristics   determined by [facility]. The U.S. Food and Drug   Administration has not approved or cleared this test; however, FDA   clearance or approval is not currently required for clinical use. The results are not intended to be used as the sole means for   clinical diagnosis or patient management decisions.     EER Hi Res Interp Ur See Note   Final 04/25/2018  9:45 AM [facility]   (NOTE)   Access [facility] Enhanced Report using either link below:   -Direct access: Rfl: 0    psyllium 0.52 G capsule, Take 0.52 g by mouth daily, Disp: , Rfl:     naproxen sodium (ALEVE) 220 MG tablet, Take 220 mg by mouth 2 times daily as needed for Pain, Disp: , Rfl:     Family History   Problem Relation Age of Onset    Heart Disease Father        Social History     Social History    Marital status:      Spouse name: N/A    Number of children: 2    Years of education: 15     Occupational History    retired      Social History Main Topics    Smoking status: Current Every Day Smoker     Packs/day: 0.50     Years: 30.00    Smokeless tobacco: Former User    Alcohol use Yes      Comment: occ. wine or rum.  Drug use: No    Sexual activity: Not on file     Other Topics Concern    Not on file     Social History Narrative    No narrative on file       Review of Systems:  Review of Systems   Constitution: Negative. HENT: Negative. Eyes: Negative. Respiratory:        Smokes   Hematologic/Lymphatic: Bruises/bleeds easily. Skin: Negative. Musculoskeletal: Positive for back pain. Gastrointestinal: Negative. Genitourinary: Negative. Neurological: Negative. Psychiatric/Behavioral: Positive for depression. The patient is nervous/anxious. Managing         Physical Exam:  BP (!) 119/59   Pulse 90   Temp 98.5 °F (36.9 °C) (Oral)   Resp 16   Ht 5' (1.524 m)   Wt 118 lb (53.5 kg)   SpO2 99%   BMI 23.05 kg/m²     Physical Exam   Constitutional: She is oriented to person, place, and time and well-developed, well-nourished, and in no distress. HENT:   Head: Normocephalic. Neck: Normal range of motion. Musculoskeletal:        Lumbar back: She exhibits tenderness. Back:    Neurological: She is alert and oriented to person, place, and time. She has normal strength. Gait normal.   Reflex Scores:       Patellar reflexes are 2+ on the right side and 2+ on the left side. Achilles reflexes are 2+ on the right side and 2+ on the left side.   Skin: Skin is warm, dry and intact. Psychiatric: Affect and judgment normal.         Assessment:    Problem List Items Addressed This Visit     Degeneration of lumbar or lumbosacral intervertebral disc - Primary (Chronic)    Relevant Medications    HYDROcodone-acetaminophen (NORCO) 5-325 MG per tablet (Start on 7/19/2018)    Lumbosacral spondylosis without myelopathy (Chronic)    Relevant Medications    HYDROcodone-acetaminophen (NORCO) 5-325 MG per tablet (Start on 7/19/2018)    Spinal stenosis, lumbar region, without neurogenic claudication (Chronic)    Relevant Medications    HYDROcodone-acetaminophen (NORCO) 5-325 MG per tablet (Start on 7/19/2018)    Medication monitoring encounter    Lumbar radiculopathy, chronic    Relevant Medications    HYDROcodone-acetaminophen (NORCO) 5-325 MG per tablet (Start on 7/19/2018)          Treatment Plan:  DISCUSSION: Treatment options discussed with patient and all questions answered to patient's satisfaction. Possible side effects, risk of tolerance and or dependence and alternative treatments discussed    Obtaining appropriate analgesic effect of treatment   No signs of potential drug abuse or diversion identified    [x] Ill effects of being on chronic pain medications such as sleep disturbances, respiratory depression, hormonal changes, withdrawal symptoms, chronic opioid dependence and tolerance as well as risk of taking opioids with Benzodiazepines and taking opioids along with alcohol,  were discussed with patient. I had asked the patient to minimize medication use and utilize pain medications only for uncontrolled rest pain or pain with exertional activities. I advised patient not to self-escalate pain medications without consulting with us. At each of patient's future visits we will try to taper pain medications, while adjusting the adjunct medications, and re-evaluating for Physical Therapy to improve spinal and joint strength.  We will continue to have discussions to decrease pain medications as tolerated. Counseled patient on effects their pain medication and /or their medical condition may have on their  ability to drive or operate machinery.  Instructed not to drive or operate machinery if drowsy    TREATMENT OPTIONS:   Return in 4 weeks  Medication Agreement Requirements Met  Continue Opioid therapy  Script written for hydrocodone  Follow up appointment made

## 2018-07-27 PROBLEM — F32.A DEPRESSIVE DISORDER: Status: ACTIVE | Noted: 2018-07-27

## 2018-08-03 ENCOUNTER — HOSPITAL ENCOUNTER (OUTPATIENT)
Age: 73
Discharge: HOME | End: 2018-08-03
Payer: MEDICARE

## 2018-08-03 ENCOUNTER — HOSPITAL ENCOUNTER (OUTPATIENT)
Age: 73
Discharge: HOME | End: 2018-08-05
Payer: MEDICARE

## 2018-08-03 ENCOUNTER — HOSPITAL ENCOUNTER (OUTPATIENT)
Dept: GENERAL RADIOLOGY | Age: 73
Discharge: HOME | End: 2018-08-05
Payer: MEDICARE

## 2018-08-03 DIAGNOSIS — R53.83 FATIGUE, UNSPECIFIED TYPE: ICD-10-CM

## 2018-08-03 DIAGNOSIS — R05.9 COUGH: ICD-10-CM

## 2018-08-03 DIAGNOSIS — F32.A DEPRESSIVE DISORDER: ICD-10-CM

## 2018-08-03 DIAGNOSIS — M48.061 SPINAL STENOSIS, LUMBAR REGION, WITHOUT NEUROGENIC CLAUDICATION: Chronic | ICD-10-CM

## 2018-08-03 DIAGNOSIS — D50.8 OTHER IRON DEFICIENCY ANEMIA: ICD-10-CM

## 2018-08-03 LAB
ABSOLUTE EOS #: 0.07 K/UL (ref 0–0.4)
ABSOLUTE IMMATURE GRANULOCYTE: ABNORMAL K/UL (ref 0–0.3)
ABSOLUTE LYMPH #: 3.03 K/UL (ref 1–4.8)
ABSOLUTE MONO #: 0.3 K/UL (ref 0.1–1.3)
ANION GAP SERPL CALCULATED.3IONS-SCNC: 14 MMOL/L (ref 9–17)
BASOPHILS # BLD: 0 % (ref 0–2)
BASOPHILS ABSOLUTE: 0 K/UL (ref 0–0.2)
BUN BLDV-MCNC: 24 MG/DL (ref 8–23)
BUN/CREAT BLD: ABNORMAL (ref 9–20)
CALCIUM SERPL-MCNC: 9.7 MG/DL (ref 8.6–10.4)
CHLORIDE BLD-SCNC: 102 MMOL/L (ref 98–107)
CO2: 23 MMOL/L (ref 20–31)
CREAT SERPL-MCNC: 1.34 MG/DL (ref 0.5–0.9)
DIFFERENTIAL TYPE: ABNORMAL
EOSINOPHILS RELATIVE PERCENT: 1 % (ref 0–4)
GFR AFRICAN AMERICAN: 47 ML/MIN
GFR NON-AFRICAN AMERICAN: 39 ML/MIN
GFR SERPL CREATININE-BSD FRML MDRD: ABNORMAL ML/MIN/{1.73_M2}
GFR SERPL CREATININE-BSD FRML MDRD: ABNORMAL ML/MIN/{1.73_M2}
GLUCOSE BLD-MCNC: 90 MG/DL (ref 70–99)
HCT VFR BLD CALC: 33.7 % (ref 36–46)
HEMOGLOBIN: 11.1 G/DL (ref 12–16)
IMMATURE GRANULOCYTES: ABNORMAL %
LYMPHOCYTES # BLD: 41 % (ref 24–44)
MCH RBC QN AUTO: 29.2 PG (ref 26–34)
MCHC RBC AUTO-ENTMCNC: 32.8 G/DL (ref 31–37)
MCV RBC AUTO: 89 FL (ref 80–100)
MONOCYTES # BLD: 4 % (ref 1–7)
MORPHOLOGY: ABNORMAL
NRBC AUTOMATED: ABNORMAL PER 100 WBC
PDW BLD-RTO: 15 % (ref 11.5–14.9)
PLATELET # BLD: 237 K/UL (ref 150–450)
PLATELET ESTIMATE: ABNORMAL
PMV BLD AUTO: 8.3 FL (ref 6–12)
POTASSIUM SERPL-SCNC: 4.5 MMOL/L (ref 3.7–5.3)
RBC # BLD: 3.79 M/UL (ref 4–5.2)
RBC # BLD: ABNORMAL 10*6/UL
SEG NEUTROPHILS: 54 % (ref 36–66)
SEGMENTED NEUTROPHILS ABSOLUTE COUNT: 4 K/UL (ref 1.3–9.1)
SODIUM BLD-SCNC: 139 MMOL/L (ref 135–144)
TSH SERPL DL<=0.05 MIU/L-ACNC: 1.78 MIU/L (ref 0.3–5)
WBC # BLD: 7.4 K/UL (ref 3.5–11)
WBC # BLD: ABNORMAL 10*3/UL

## 2018-08-03 PROCEDURE — 36415 COLL VENOUS BLD VENIPUNCTURE: CPT

## 2018-08-03 PROCEDURE — 84443 ASSAY THYROID STIM HORMONE: CPT

## 2018-08-03 PROCEDURE — 85025 COMPLETE CBC W/AUTO DIFF WBC: CPT

## 2018-08-03 PROCEDURE — 71046 X-RAY EXAM CHEST 2 VIEWS: CPT

## 2018-08-03 PROCEDURE — 80048 BASIC METABOLIC PNL TOTAL CA: CPT

## 2018-08-17 ENCOUNTER — HOSPITAL ENCOUNTER (OUTPATIENT)
Dept: PAIN MANAGEMENT | Age: 73
Discharge: HOME | End: 2018-08-17
Payer: MEDICARE

## 2018-08-23 ENCOUNTER — HOSPITAL ENCOUNTER (OUTPATIENT)
Dept: PAIN MANAGEMENT | Age: 73
Discharge: HOME | End: 2018-08-23
Payer: MEDICARE

## 2018-08-23 VITALS
SYSTOLIC BLOOD PRESSURE: 132 MMHG | RESPIRATION RATE: 16 BRPM | BODY MASS INDEX: 22.97 KG/M2 | HEART RATE: 78 BPM | TEMPERATURE: 98.6 F | DIASTOLIC BLOOD PRESSURE: 65 MMHG | WEIGHT: 117 LBS | OXYGEN SATURATION: 99 % | HEIGHT: 60 IN

## 2018-08-23 DIAGNOSIS — M47.817 LUMBOSACRAL SPONDYLOSIS WITHOUT MYELOPATHY: ICD-10-CM

## 2018-08-23 DIAGNOSIS — Z51.81 MEDICATION MONITORING ENCOUNTER: ICD-10-CM

## 2018-08-23 DIAGNOSIS — M48.061 SPINAL STENOSIS, LUMBAR REGION, WITHOUT NEUROGENIC CLAUDICATION: ICD-10-CM

## 2018-08-23 DIAGNOSIS — M51.37 DEGENERATION OF LUMBAR OR LUMBOSACRAL INTERVERTEBRAL DISC: Primary | ICD-10-CM

## 2018-08-23 DIAGNOSIS — M54.16 LUMBAR RADICULOPATHY, CHRONIC: ICD-10-CM

## 2018-08-23 PROCEDURE — 99213 OFFICE O/P EST LOW 20 MIN: CPT

## 2018-08-23 PROCEDURE — 99213 OFFICE O/P EST LOW 20 MIN: CPT | Performed by: NURSE PRACTITIONER

## 2018-08-23 RX ORDER — HYDROCODONE BITARTRATE AND ACETAMINOPHEN 5; 325 MG/1; MG/1
1 TABLET ORAL 2 TIMES DAILY PRN
Qty: 60 TABLET | Refills: 0 | Status: SHIPPED | OUTPATIENT
Start: 2018-08-23 | End: 2018-09-20 | Stop reason: SDUPTHER

## 2018-08-23 ASSESSMENT — ENCOUNTER SYMPTOMS
GASTROINTESTINAL NEGATIVE: 1
EYES NEGATIVE: 1
BACK PAIN: 1
RESPIRATORY NEGATIVE: 1

## 2018-08-23 NOTE — PROGRESS NOTES
AM [facility]   Tapentadol, Urine Not Detected   Final 04/25/2018  9:45 AM [facility]   Temazepam, Urine Not Detected   Final 04/25/2018  9:45 AM [facility]   Tramadol, Urine Not Detected   Final 04/25/2018  9:45 AM [facility]   Zolpidem, Urine Not Detected   Final 04/25/2018  9:45 AM [facility]   Drugs Expected, Ur [address] 4/25   Final 04/25/2018  9:45 AM [facility] [address] [facility]   Creatinine, Ur 187.2  20.0 - 400.0 mg/dL Final 04/25/2018  9:45 AM [facility]   Pain Mgt Drug Panel, Hi Res, Ur See Below   Final 04/25/2018  9:45 AM [facility]   (NOTE)   Methodology: Qualitative Enzyme Immunoassay and Qualitative Liquid   Chromatography-Time of Flight-Mass Spectrometry or Tandem Mass   Spectrometry, Quantitative Spectrophotometry   The absence of expected drug(s) and/or drug metabolite(s) may   indicate non-compliance, inappropriate timing of specimen   collection relative to drug administration, poor drug absorption,   diluted/adulterated urine, or limitations of testing. The   concentration must be greater than or equal to the cutoff to be   reported as present.  If specific drug concentrations are   required, contact the laboratory within two weeks of specimen   collection to request quantification by a second analytical   technique. Interpretive questions should be directed to the   laboratory. Results based on immunoassay detection that do not match clinical   expectations should be   interpreted with caution. Confirmatory testing by mass   spectrometry for immunoassay-based results is available, if   ordered within two weeks of specimen collection. Additional   charges apply. For medical purposes only; not valid for forensic use. This test was developed and its performance characteristics   determined by [facility]. The U.S. Food and Drug   Administration has not approved or cleared this test; however, FDA   clearance or approval is not currently required for clinical use.    The results are not intended to be used as the sole means for

## 2018-09-20 ENCOUNTER — HOSPITAL ENCOUNTER (OUTPATIENT)
Dept: PAIN MANAGEMENT | Age: 73
Discharge: HOME | End: 2018-09-20
Payer: MEDICARE

## 2018-09-20 VITALS
BODY MASS INDEX: 22.97 KG/M2 | OXYGEN SATURATION: 97 % | SYSTOLIC BLOOD PRESSURE: 129 MMHG | HEIGHT: 60 IN | RESPIRATION RATE: 16 BRPM | TEMPERATURE: 98.6 F | HEART RATE: 75 BPM | WEIGHT: 117 LBS | DIASTOLIC BLOOD PRESSURE: 66 MMHG

## 2018-09-20 DIAGNOSIS — M54.16 LUMBAR RADICULOPATHY, CHRONIC: ICD-10-CM

## 2018-09-20 DIAGNOSIS — Z51.81 MEDICATION MONITORING ENCOUNTER: ICD-10-CM

## 2018-09-20 DIAGNOSIS — M48.061 SPINAL STENOSIS, LUMBAR REGION, WITHOUT NEUROGENIC CLAUDICATION: ICD-10-CM

## 2018-09-20 DIAGNOSIS — M47.817 LUMBOSACRAL SPONDYLOSIS WITHOUT MYELOPATHY: ICD-10-CM

## 2018-09-20 DIAGNOSIS — M51.37 DEGENERATION OF LUMBAR OR LUMBOSACRAL INTERVERTEBRAL DISC: Primary | ICD-10-CM

## 2018-09-20 PROCEDURE — 99213 OFFICE O/P EST LOW 20 MIN: CPT | Performed by: NURSE PRACTITIONER

## 2018-09-20 PROCEDURE — 99213 OFFICE O/P EST LOW 20 MIN: CPT

## 2018-09-20 RX ORDER — HYDROCODONE BITARTRATE AND ACETAMINOPHEN 5; 325 MG/1; MG/1
1 TABLET ORAL 2 TIMES DAILY PRN
Qty: 60 TABLET | Refills: 0 | Status: SHIPPED | OUTPATIENT
Start: 2018-09-22 | End: 2018-10-18 | Stop reason: SDUPTHER

## 2018-09-20 ASSESSMENT — ENCOUNTER SYMPTOMS
RESPIRATORY NEGATIVE: 1
EYES NEGATIVE: 1
BACK PAIN: 1
GASTROINTESTINAL NEGATIVE: 1

## 2018-09-20 NOTE — PROGRESS NOTES
[facility]   Tapentadol, Urine Not Detected    Final 04/25/2018  9:45 AM [facility]   Temazepam, Urine Not Detected    Final 04/25/2018  9:45 AM [facility]   Tramadol, Urine Not Detected    Final 04/25/2018  9:45 AM [facility]   Zolpidem, Urine Not Detected    Final 04/25/2018  9:45 AM [facility]   Drugs Expected, Ur [address] 4/25    Final 04/25/2018  9:45 AM [facility]   Creatinine, Ur 187.2  20.0 - 400.0 mg/dL Final 04/25/2018  9:45 AM [facility]   Pain Mgt Drug Panel, Hi Res, Ur See Below    Final 04/25/2018  9:45 AM [facility]   (NOTE)   Methodology: Qualitative Enzyme Immunoassay and Qualitative Liquid   Chromatography-Time of Flight-Mass Spectrometry or Tandem Mass   Spectrometry, Quantitative Spectrophotometry   The absence of expected drug(s) and/or drug metabolite(s) may   indicate non-compliance, inappropriate timing of specimen   collection relative to drug administration, poor drug absorption,   diluted/adulterated urine, or limitations of testing. The   concentration must be greater than or equal to the cutoff to be   reported as present.  If specific drug concentrations are   required, contact the laboratory within two weeks of specimen   collection to request quantification by a second analytical   technique. Interpretive questions should be directed to the   laboratory. Results based on immunoassay detection that do not match clinical   expectations should be   interpreted with caution. Confirmatory testing by mass   spectrometry for immunoassay-based results is available, if   ordered within two weeks of specimen collection. Additional   charges apply. For medical purposes only; not valid for forensic use. This test was developed and its performance characteristics   determined by [facility]. The U.S. Food and Drug   Administration has not approved or cleared this test; however, FDA   clearance or approval is not currently required for clinical use.    The results are not intended to be used as the sole means for   clinical diagnosis or patient management decisions. EER Hi Res Interp Ur See Note    Final 04/25/2018  9:45 AM [facility]   (NOTE)   Access [facility] Enhanced Report using either link below:   -Direct access: [email or web address]   -Enter Username, Password: [email or web address]   Username: [email or web address]? Password: [email or web address]   Performed by [facility] [place] [address], [address] [facility] [phone]   www. Chava Qureshi MD, Lab. Director   Performed at [address] [place] [address] Dr Rajinder Lynn. [place], [address] [place] [address] [phone]                Past Medical History         Past Medical History:   Diagnosis Date    Anxiety     Cancer ([facility] [address]     breast cancer, left        Past Surgical History:   Procedure Laterality Date    APPENDECTOMY      BREAST LUMPECTOMY      right fatty tissue removed    BREAST REDUCTION SURGERY Right     BREAST SURGERY Left     left mastectomy w/reconstruction, tram    CATARACT REMOVAL Bilateral 03/03/2016    CHOLECYSTECTOMY      COLONOSCOPY      COLONOSCOPY  10/13/2016    Dr Yao Samples         No Known Allergies      Current Outpatient Prescriptions:     [START ON 9/22/2018] HYDROcodone-acetaminophen (NORCO) 5-325 MG per tablet, Take 1 tablet by mouth 2 times daily as needed for Pain for up to 30 days. ., Disp: 60 tablet, Rfl: 0    calcitonin (MIACALCIN) 200 UNIT/ACT nasal spray, 1 spray by Nasal route daily, Disp: 11.1 mL, Rfl: 1    citalopram (CELEXA) 40 MG tablet, Take 1 tablet by mouth nightly, Disp: 90 tablet, Rfl: 1    buPROPion (WELLBUTRIN XL) 300 MG extended release tablet, Take 1 tablet by mouth every morning, Disp: 90 tablet, Rfl: 0    Multiple Vitamins-Minerals (MULTIVITAMIN ADULT PO), Take by mouth, Disp: , Rfl:     Calcium Carb-Cholecalciferol (CALCIUM + D3) 600-200 MG-UNIT TABS tablet, Take 1 tablet by mouth Daily with lunch, Disp: , Rfl:     naproxen sodium (ALEVE) 220 MG tablet, Take 220 mg by mouth 2 times daily as needed for Pain, Disp: , Rfl:     Family History   Problem Relation Age of Onset    Heart Disease Father        Social History     Social History    Marital status:      Spouse name: N/A    Number of children: 2    Years of education: 15     Occupational History    retired      Social History Main Topics    Smoking status: Current Every Day Smoker     Packs/day: 1.00     Years: 32.00    Smokeless tobacco: Never Used    Alcohol use Yes      Comment: occ. wine or rum.  Drug use: No    Sexual activity: Not on file     Other Topics Concern    Not on file     Social History Narrative    No narrative on file       Review of Systems:  Review of Systems   Constitution: Negative. HENT: Negative. Eyes: Negative. Cardiovascular: Negative. Respiratory: Negative. Smokes   Endocrine: Negative. Skin: Negative. Musculoskeletal: Positive for back pain and joint pain. Pain   Gastrointestinal: Negative. Genitourinary: Positive for nocturia. Neurological: Negative. Psychiatric/Behavioral: The patient is nervous/anxious. Managing         Physical Exam:  /66   Pulse 75   Temp 98.6 °F (37 °C) (Oral)   Resp 16   Ht 5' (1.524 m)   Wt 117 lb (53.1 kg)   SpO2 97%   BMI 22.85 kg/m²     Physical Exam   Constitutional: She is oriented to person, place, and time and well-developed, well-nourished, and in no distress. HENT:   Head: Normocephalic. Neck: Normal range of motion. Neck supple. Pulmonary/Chest: Effort normal.   Musculoskeletal:        Lumbar back: She exhibits decreased range of motion and tenderness. Neurological: She is alert and oriented to person, place, and time. She has normal strength. Gait normal.   Reflex Scores:       Patellar reflexes are 2+ on the right side and 2+ on the left side. Achilles reflexes are 2+ on the right side and 2+ on the left side. Skin: Skin is warm, dry and intact.         Psychiatric: Affect discussions to decrease pain medications as tolerated. Counseled patient on effects their pain medication and /or their medical condition may have on their  ability to drive or operate machinery. Instructed not to drive or operate machinery if drowsy     I also discussed with the patient regarding the dangers of combining narcotic pain medication with tranquilizers, alcohol or illegal drugs or taking the medication any way other than prescribed. The dangers were discussed  including respiratory depression and death. Patient was told to tell  all  physicians regarding the medications he is getting from pain clinic. Patient is warned not to take any unprescribed medications over-the-counter medications that can depress breathing . Patient is advised to talk to the pharmacist or physicians if planning to take any over-the-counter medications before  takeing them. Patient is strongly advised to avoid tranquilizers or  relaxants, illegal drugs  or any medications that can depress breathing  Patient is also advised to tell us if there is any changes in their medications from other physicians.      she is asking about increasing strength of pain medication, will continue the same   She declined injection   Referral PT  Dry needling to help with back pain    TREATMENT OPTIONS:     Return in 4 weeks  Medication Agreement Requirements Met  Continue Opioid therapy  Script written for hydrocodone  Referral PT  Follow up appointment made

## 2018-09-26 ENCOUNTER — HOSPITAL ENCOUNTER (OUTPATIENT)
Dept: PHYSICAL THERAPY | Age: 73
Setting detail: THERAPIES SERIES
Discharge: HOME | End: 2018-09-26
Payer: MEDICARE

## 2018-09-26 PROCEDURE — 97161 PT EVAL LOW COMPLEX 20 MIN: CPT

## 2018-09-26 PROCEDURE — G8991 OTHER PT/OT GOAL STATUS: HCPCS

## 2018-09-26 PROCEDURE — G8990 OTHER PT/OT CURRENT STATUS: HCPCS

## 2018-09-26 PROCEDURE — G0283 ELEC STIM OTHER THAN WOUND: HCPCS

## 2018-09-26 PROCEDURE — 97110 THERAPEUTIC EXERCISES: CPT

## 2018-09-26 ASSESSMENT — PAIN DESCRIPTION - ORIENTATION: ORIENTATION: LEFT;LOWER

## 2018-09-26 ASSESSMENT — PAIN SCALES - GENERAL: PAINLEVEL_OUTOF10: 5

## 2018-09-26 ASSESSMENT — PAIN DESCRIPTION - DESCRIPTORS: DESCRIPTORS: ACHING;DULL;CONSTANT

## 2018-09-26 ASSESSMENT — PAIN DESCRIPTION - PAIN TYPE: TYPE: CHRONIC PAIN

## 2018-09-26 ASSESSMENT — PAIN DESCRIPTION - FREQUENCY: FREQUENCY: CONTINUOUS

## 2018-09-26 ASSESSMENT — PAIN DESCRIPTION - LOCATION: LOCATION: BACK;LEG

## 2018-09-26 NOTE — PROGRESS NOTES
Physical Therapy  Initial Assessment  Date: 2018  Patient Name: Lindsay Hutchinson  MRN: [id number]  : 1945     Treatment Diagnosis: difficulty walking    Subjective   General  Chart Reviewed: Yes  Patient assessed for rehabilitation services?: Yes  Additional Pertinent Hx: low back pain for the last 5 years,had pain management for 5 years and minimal relief,goes to chiropractor 1x/month  Family / Caregiver Present: No  Referring Practitioner: Tonya Peterson  Referral Date : 18  Diagnosis: DDD lumbar M51.37  Follows Commands: Within Functional Limits  PT Visit Information  Onset Date: 13  PT Insurance Information: medicare/generic commercial  Total # of Visits Approved: 12  Total # of Visits to Date: 1  Subjective  Subjective: low back pain radiating to left leg  Pain Screening  Patient Currently in Pain: Yes  Pain Assessment  Pain Assessment: 0-10  Pain Level: 5  Pain Type: Chronic pain  Pain Location: Back;Leg  Pain Orientation: Left; Lower  Pain Descriptors: Aching;Dull;Constant  Pain Frequency: Continuous  Vital Signs  Patient Currently in Pain: Yes    Vision/Hearing  Vision  Vision: Within Functional Limits  Hearing  Hearing: Within functional limits    Orientation  Orientation  Overall Orientation Status: Within Normal Limits    Social/Functional History  Social/Functional History  Lives With: Significant other  Type of Home: House  Home Layout: Two level  Home Access: Stairs to enter with rails  Entrance Stairs - Number of Steps: 2  Entrance Stairs - Rails: Left  ADL Assistance: Independent  Homemaking Assistance: Independent  Ambulation Assistance: Independent  Transfer Assistance: Independent  Active : Yes  Mode of Transportation: Car  Occupation: Retired  Additional Comments: partner has muscular dystrophy,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  Thank you for this referral.        Medicare/Regulatory Requirements:  I have reviewed this plan of care and certify a need for   Medically necessary rehabilitation services.   [] Physician Signature    Date:     Electronically signed by: Sharon Spencer, [id number] @ [address], [address] [place] [address]  Phone [phone]  Fax [phone]

## 2018-10-01 ENCOUNTER — HOSPITAL ENCOUNTER (OUTPATIENT)
Dept: PHYSICAL THERAPY | Age: 73
Setting detail: THERAPIES SERIES
Discharge: HOME | End: 2018-10-01
Payer: MEDICARE

## 2018-10-01 PROCEDURE — 97110 THERAPEUTIC EXERCISES: CPT

## 2018-10-01 PROCEDURE — G0283 ELEC STIM OTHER THAN WOUND: HCPCS

## 2018-10-01 ASSESSMENT — PAIN DESCRIPTION - DESCRIPTORS: DESCRIPTORS: ACHING

## 2018-10-01 ASSESSMENT — PAIN DESCRIPTION - FREQUENCY: FREQUENCY: CONTINUOUS

## 2018-10-01 ASSESSMENT — PAIN DESCRIPTION - LOCATION: LOCATION: BACK;HIP

## 2018-10-01 ASSESSMENT — PAIN SCALES - GENERAL: PAINLEVEL_OUTOF10: 2

## 2018-10-01 ASSESSMENT — PAIN DESCRIPTION - ORIENTATION: ORIENTATION: LEFT;LOWER

## 2018-10-01 ASSESSMENT — PAIN DESCRIPTION - PAIN TYPE: TYPE: CHRONIC PAIN

## 2018-10-02 ENCOUNTER — HOSPITAL ENCOUNTER (OUTPATIENT)
Dept: WOMENS IMAGING | Age: 73
Discharge: HOME | End: 2018-10-04
Payer: MEDICARE

## 2018-10-02 DIAGNOSIS — Z12.39 SCREENING FOR MALIGNANT NEOPLASM OF BREAST: ICD-10-CM

## 2018-10-02 DIAGNOSIS — Z90.12 HISTORY OF MASTECTOMY, LEFT: ICD-10-CM

## 2018-10-02 PROCEDURE — 77063 BREAST TOMOSYNTHESIS BI: CPT

## 2018-10-04 ENCOUNTER — HOSPITAL ENCOUNTER (OUTPATIENT)
Dept: PHYSICAL THERAPY | Age: 73
Setting detail: THERAPIES SERIES
Discharge: HOME | End: 2018-10-04
Payer: MEDICARE

## 2018-10-04 PROCEDURE — G0283 ELEC STIM OTHER THAN WOUND: HCPCS

## 2018-10-04 PROCEDURE — 97110 THERAPEUTIC EXERCISES: CPT

## 2018-10-04 ASSESSMENT — PAIN DESCRIPTION - LOCATION: LOCATION: BACK

## 2018-10-04 ASSESSMENT — PAIN DESCRIPTION - DESCRIPTORS: DESCRIPTORS: ACHING;CONSTANT

## 2018-10-04 ASSESSMENT — PAIN DESCRIPTION - FREQUENCY: FREQUENCY: CONTINUOUS

## 2018-10-04 ASSESSMENT — PAIN DESCRIPTION - ORIENTATION: ORIENTATION: LOWER

## 2018-10-04 ASSESSMENT — PAIN SCALES - GENERAL: PAINLEVEL_OUTOF10: 4

## 2018-10-04 NOTE — PROGRESS NOTES
Physical Therapy  Daily Treatment Note  Date: 10/4/2018  Patient Name: Tariq Yuan  MRN: [id number]     :   1945    Subjective:      PT Visit Information  Onset Date: 13  PT Insurance Information: medicare/generic commercial  Total # of Visits Approved: 12  Total # of Visits to Date: 3  Subjective  Subjective: complains of low back pain today  Pain Screening  Patient Currently in Pain: Yes  Pain Assessment  Pain Assessment: 0-10  Pain Level: 4  Pain Location: Back  Pain Orientation: Lower  Pain Descriptors: Aching;Constant  Pain Frequency: Continuous  Vital Signs  Patient Currently in Pain: Yes       Treatment Activities:   Exercises  Exercise 1: B SKTC 10X10\"  Exercise 2: B LTR 10X10\"  Exercise 3: DKTC 10X10\"  Exercise 4: B hamstring stretch 3x30\"  Exercise 5: B pyriformis stretch 3x30\"  Exercise 6: hot pack and EStim to lumbar area sitting after ex  Exercise 7: Green Tband BLE 4 way hip 10x holding on RW  Exercise 8: R1 Tband rows/pull down BUE 30x     Assessment:   Conditions Requiring Skilled Therapeutic Intervention  Assessment: progress with ex  REQUIRES PT FOLLOW UP: Yes     Plan:    Plan  Times per week: 2x/week  Current Treatment Recommendations: ROM, Modalities, Home Exercise Program  Plan Comment: to continue PT per POC  Timed Code Treatment Minutes: 20 Minutes   Treatment Charges: Minutes Units   []  Ultrasound     [x]  Electrical-Stim 20 1   []  Iontophoresis     []  Traction     []  Massage       []  Eval     []  Gait     [x]  Ther Exercise 20  1    []  Manual Therapy       []  Ther Activities       []  Aquatics     []  Vasopneumatic Device     []  Neuro Re-Ed       []  Other       Total Treatment Time: 40 2        Therapy Time   Individual Concurrent Group Co-treatment   Time In 1020         Time Out 1100         Minutes 40         Timed Code Treatment Minutes: 20 Minutes     Electronically signed by: Ángel Davis PT

## 2018-10-09 ENCOUNTER — HOSPITAL ENCOUNTER (OUTPATIENT)
Dept: PHYSICAL THERAPY | Age: 73
Setting detail: THERAPIES SERIES
Discharge: HOME | End: 2018-10-09
Payer: MEDICARE

## 2018-10-09 PROCEDURE — 97110 THERAPEUTIC EXERCISES: CPT

## 2018-10-09 PROCEDURE — G0283 ELEC STIM OTHER THAN WOUND: HCPCS

## 2018-10-09 ASSESSMENT — PAIN DESCRIPTION - ORIENTATION: ORIENTATION: LEFT;LOWER

## 2018-10-09 ASSESSMENT — PAIN DESCRIPTION - DESCRIPTORS: DESCRIPTORS: ACHING

## 2018-10-09 ASSESSMENT — PAIN DESCRIPTION - FREQUENCY: FREQUENCY: CONTINUOUS

## 2018-10-09 ASSESSMENT — PAIN SCALES - GENERAL: PAINLEVEL_OUTOF10: 1

## 2018-10-09 ASSESSMENT — PAIN DESCRIPTION - LOCATION: LOCATION: BACK;HIP

## 2018-10-11 ENCOUNTER — HOSPITAL ENCOUNTER (OUTPATIENT)
Dept: PHYSICAL THERAPY | Age: 73
Setting detail: THERAPIES SERIES
Discharge: HOME | End: 2018-10-11
Payer: MEDICARE

## 2018-10-11 PROCEDURE — G8991 OTHER PT/OT GOAL STATUS: HCPCS

## 2018-10-11 PROCEDURE — 97110 THERAPEUTIC EXERCISES: CPT

## 2018-10-11 PROCEDURE — G0283 ELEC STIM OTHER THAN WOUND: HCPCS

## 2018-10-11 PROCEDURE — G8992 OTHER PT/OT  D/C STATUS: HCPCS

## 2018-10-11 NOTE — PROGRESS NOTES
Physical Therapy Discharge Note    Date: 10/11/2018  Patient Name: Lorna Garner  MRN: [id number]  : 1945     Treatment Diagnosis: difficulty walking    Subjective   General  Referring Practitioner: Sarah Sharma  Referral Date : 18  Diagnosis: DDD lumbar M51.37  General Comment  Comments: leaving for [place],today will be her last day  PT Visit Information  Onset Date: 13  PT Insurance Information: medicare/generic commercial  Total # of Visits Approved: 12  Total # of Visits to Date: 5  Subjective  Subjective: no complains of pain today  Pain Screening  Patient Currently in Pain: No  Vital Signs  Patient Currently in Pain: No  Objective  AROM RLE (degrees)  RLE General AROM:  HS 70  AROM LLE (degrees)  LLE General AROM:  HS 80 full L hip ER  Spine  Lumbar: AROM TRUNK FLEX 80 EXT 40 ROTB FULL RANGE SBB 40     Exercises  Exercise 1: B SKTC 10X10\"  Exercise 2: B LTR 10X10\"  Exercise 3: DKTC 10X10\"  Exercise 4: B hamstring stretch 3x30\"  Exercise 5: B pyriformis stretch 3x30\"  Exercise 6: hot pack and EStim to lumbar area sitting after ex Seated  Exercise 7: Light green Tband BLE 4 way hip 10x holding on RW  Exercise 8: R1 Tband rows/pull down BUE 30x    Assessment   Conditions Requiring Skilled Therapeutic Intervention  Assessment: short term goals met 2/3 long term goal met 0/1  Treatment Diagnosis: difficulty walking  REQUIRES PT FOLLOW UP: Yes  Discharge Recommendations: Home independently  Activity Tolerance  Activity Tolerance: Patient Tolerated treatment well         Plan   Plan  Plan Comment: TO DISCONTINUE PT AND PATIENT TO CONTINUE HEP    G-Code  PT G-Codes  Functional Assessment Tool Used: oswestry disability index score  Score: 22%  Functional Limitation: Other PT primary  Other PT Primary Goal Status (): 0 percent impaired, limited or restricted  Other PT Primary Discharge Status ():  At least 20 percent but less than 40 percent impaired, limited or

## 2018-10-18 ENCOUNTER — HOSPITAL ENCOUNTER (OUTPATIENT)
Age: 73
Discharge: HOME | End: 2018-10-20
Payer: MEDICARE

## 2018-10-18 ENCOUNTER — HOSPITAL ENCOUNTER (OUTPATIENT)
Dept: GENERAL RADIOLOGY | Age: 73
Discharge: HOME | End: 2018-10-20
Payer: MEDICARE

## 2018-10-18 ENCOUNTER — HOSPITAL ENCOUNTER (OUTPATIENT)
Dept: PAIN MANAGEMENT | Age: 73
Discharge: HOME | End: 2018-10-18
Payer: MEDICARE

## 2018-10-18 VITALS
DIASTOLIC BLOOD PRESSURE: 64 MMHG | SYSTOLIC BLOOD PRESSURE: 119 MMHG | WEIGHT: 118 LBS | TEMPERATURE: 98.4 F | OXYGEN SATURATION: 99 % | BODY MASS INDEX: 23.16 KG/M2 | HEART RATE: 83 BPM | RESPIRATION RATE: 16 BRPM | HEIGHT: 60 IN

## 2018-10-18 DIAGNOSIS — Z51.81 MEDICATION MONITORING ENCOUNTER: Primary | ICD-10-CM

## 2018-10-18 DIAGNOSIS — M54.16 LUMBAR RADICULOPATHY, CHRONIC: ICD-10-CM

## 2018-10-18 DIAGNOSIS — M48.061 SPINAL STENOSIS, LUMBAR REGION, WITHOUT NEUROGENIC CLAUDICATION: ICD-10-CM

## 2018-10-18 DIAGNOSIS — M47.817 LUMBOSACRAL SPONDYLOSIS WITHOUT MYELOPATHY: ICD-10-CM

## 2018-10-18 DIAGNOSIS — M51.37 DEGENERATION OF LUMBAR OR LUMBOSACRAL INTERVERTEBRAL DISC: ICD-10-CM

## 2018-10-18 DIAGNOSIS — M25.552 LEFT HIP PAIN: ICD-10-CM

## 2018-10-18 PROCEDURE — 99213 OFFICE O/P EST LOW 20 MIN: CPT

## 2018-10-18 PROCEDURE — 99214 OFFICE O/P EST MOD 30 MIN: CPT | Performed by: PAIN MEDICINE

## 2018-10-18 PROCEDURE — 73502 X-RAY EXAM HIP UNI 2-3 VIEWS: CPT

## 2018-10-18 RX ORDER — HYDROCODONE BITARTRATE AND ACETAMINOPHEN 5; 325 MG/1; MG/1
1 TABLET ORAL 2 TIMES DAILY PRN
Qty: 60 TABLET | Refills: 0 | Status: SHIPPED | OUTPATIENT
Start: 2018-10-23 | End: 2019-06-20

## 2018-10-18 ASSESSMENT — PAIN DESCRIPTION - FREQUENCY: FREQUENCY: CONTINUOUS

## 2018-10-18 ASSESSMENT — ENCOUNTER SYMPTOMS
ABDOMINAL PAIN: 0
SHORTNESS OF BREATH: 0
DIARRHEA: 0
PHOTOPHOBIA: 0
SORE THROAT: 0
BACK PAIN: 1
CONSTIPATION: 0
EYES NEGATIVE: 1
COUGH: 0

## 2018-10-18 ASSESSMENT — PAIN DESCRIPTION - PAIN TYPE: TYPE: CHRONIC PAIN

## 2018-10-18 ASSESSMENT — PAIN DESCRIPTION - DESCRIPTORS: DESCRIPTORS: ACHING

## 2018-10-18 ASSESSMENT — PAIN DESCRIPTION - DIRECTION: RADIATING_TOWARDS: LEFT HIP, LEFT LEG

## 2018-10-18 ASSESSMENT — PAIN DESCRIPTION - ORIENTATION: ORIENTATION: LOWER;LEFT;RIGHT

## 2018-10-18 ASSESSMENT — PAIN DESCRIPTION - ONSET: ONSET: ON-GOING

## 2018-10-18 ASSESSMENT — PAIN DESCRIPTION - PROGRESSION: CLINICAL_PROGRESSION: NOT CHANGED

## 2018-10-18 ASSESSMENT — PAIN SCALES - GENERAL: PAINLEVEL_OUTOF10: 1

## 2018-10-18 ASSESSMENT — PAIN DESCRIPTION - LOCATION: LOCATION: BACK

## 2018-10-18 NOTE — PROGRESS NOTES
abuse?no    Current Pain Assessment  Pain Assessment  Pain Assessment: 0-10  Pain Level: 1  Pain Type: Chronic pain  Pain Location: Back  Pain Orientation: Lower, Left, Right (worse on left)  Pain Radiating Towards: left hip, left leg  Pain Descriptors: Aching (hurt)  Pain Frequency: Continuous  Pain Onset: On-going  Clinical Progression: Not changed  Effect of Pain on Daily Activities: pain increases with bending, standing, walking  Patient's Stated Pain Goal:  (pain at 2 with bending, standing, walking)  Pain Intervention(s): Medication (see eMar), Rest, Repositioned, Heat applied                    ADVERSE MEDICATION EFFECTS:   Constipation: no  Bowel Regimen: No:   Diet: common adult  Appetite:  ok  Sedation:  no  Urinary Retention: no    FOCUSED PAINSCALE:  Highest : 7  Lowest :1  Average: Range-varies with activity  When and What  was your last procedure:    LOCO   Was your procedure effective:  Helped x 2 weeks    ACTIVITY/SOCIAL/EMOTIONAL:  Sleep Pattern: 8 hours per night. generally restful sleep  Energy Level:  Normal  Currently attending Physical Therapy:  Completed physical therapy  Home Exercises: daily stretching  Mobility: pain increases with standing and activity   Currently seeing a Psychiatrist or Psychologist:  No  Emotional Issues: depression with caring for significant other, denies suicidal thought,anxiety, mood swings   Mood: appropriate     ABERRANT BEHAVIORS SINCE LAST VISIT:  Have you ever been treated in another Pain Clinic not applicable  Refills for prescriptions appropriate: yes  Lost rx/pills:no  Taking more medication than prescribed:  no  Are you receiving PAIN medications from  other doctors: no  Last Urine/Serum Drug Screen :4-2018  Was Serum/UDS as anticipated?   yes  Brought pill bottles in :yes   Was Pill count appropriate? :yes   Are currently pregnant?not applicable  Recent ER visits: No             Past Medical History      Diagnosis Date    Anxiety     Cancer ([facility] [address] History Narrative    None      reports that she does not use drugs. REVIEW OF SYSTEMS:  Review of Systems   Constitutional: Negative for activity change, chills, fever and unexpected weight change. HENT: Negative for ear pain and sore throat. Eyes: Negative. Negative for photophobia and visual disturbance. Respiratory: Negative for cough and shortness of breath. Cardiovascular: Negative for chest pain and palpitations. Gastrointestinal: Negative for abdominal pain, constipation and diarrhea. Endocrine: Negative for cold intolerance and polydipsia. Genitourinary: Negative for difficulty urinating and hematuria. Musculoskeletal: Positive for back pain. Skin: Negative. Negative for rash. Neurological: Negative for tingling, tremors, seizures, speech difficulty, weakness, numbness and headaches. Hematological: Bruises/bleeds easily. Psychiatric/Behavioral: Negative for behavioral problems and suicidal ideas. The patient is nervous/anxious. GENERAL PHYSICAL EXAM:  Vitals: /64   Pulse 83   Temp 98.4 °F (36.9 °C) (Oral)   Resp 16   Ht 5' (1.524 m)   Wt 118 lb (53.5 kg)   SpO2 99%   BMI 23.05 kg/m² , Body mass index is 23.05 kg/m². Physical Exam   Constitutional: She is oriented to person, place, and time. She appears well-developed and well-nourished. HENT:   Head: Normocephalic and atraumatic. Eyes: Pupils are equal, round, and reactive to light. Conjunctivae are normal.   Neck: Normal range of motion. Neck supple. No tracheal deviation present. No thyromegaly present. Cardiovascular: Normal rate and regular rhythm. Pulmonary/Chest: Effort normal. No respiratory distress. Abdominal: She exhibits no distension. Musculoskeletal: She exhibits no edema. Neurological: She is alert and oriented to person, place, and time. She has normal strength. She displays no atrophy, no tremor and normal reflexes. No cranial nerve deficit or sensory deficit.  She exhibits normal muscle tone. She displays a negative Romberg sign. Coordination normal.   Reflex Scores:       Patellar reflexes are 2+ on the right side and 1+ on the left side. Achilles reflexes are 1+ on the right side and 1+ on the left side. Skin: Skin is warm and dry. No erythema. Psychiatric: She has a normal mood and affect. Her speech is normal and behavior is normal. Judgment and thought content normal. Cognition and memory are normal.    Right Ankle Exam     Muscle Strength   The patient has normal right ankle strength. Left Ankle Exam     Muscle Strength   The patient has normal left ankle strength. Right Knee Exam     Muscle Strength     The patient has normal right knee strength. Left Knee Exam     Muscle Strength     The patient has normal left knee strength. Right Hip Exam     Muscle Strength   The patient has normal right hip strength. Left Hip Exam     Muscle Strength   The patient has normal left hip strength. Back Exam     Tenderness   The patient is experiencing tenderness in the lumbar and sacroiliac. Range of Motion   Back extension: Limited and painful. Back flexion: Limited and painful. Back lateral bend right: Limited and painful. Back lateral bend left: Limited and painful. Back rotation right: Limited and painful. Back rotation left: Limited and painful. Tests   Straight leg raise right: negative  Straight leg raise left: positive    Other   Sensation: normal  Gait: antalgic   Erythema: no back redness  Scars: absent    Comments:  Examination of the spinous processes reveals scoliotic deformity with slight increase in the thoracic kyphosis            Nurses Notes and Vital Signs reviewed. Eulogio Husain DATA  Labs:  Benzodiazepine Screen, Urine   Date Value Ref Range Status   05/29/2014 NEGATIVE NEG Final     Comment:           (Positive cutoff 200 ng/mL)              Performed at [address]  [facility] least 2½ hours a week (30 minutes       a day five times a week or 50 minutes a day three times a week), or as much as you       can. Exercises that can improve balance, such as Law Chi or yoga, may help prevent      Falls. 6) Gait training and fall prevention measures     Decision Making Process : Patient's health history and referral records thoroughly reviewed before focused physical examination and discussion with patient. I have spent 20 Over 50% of today's visit is spent on examining the patient and counseling and coordinating the care. Level of complexity of date to be reviewed is Moderate. The chart date reviewed include the following: Imaging Reports. Summary of Care. Time spent reviewing with patient the below reports:   Medication safety, Treatment options. Level of diagnosis and management options of this case is multiple: involving the following management options: Interventions as needed, medication management as appropriate, future visits, activity modification, heat/ice as needed, Urine drug screen as required. [x]The patient's questions were answered to the best of my abilities. This note was created using voice recognition software. There may be inaccuracies of transcription  that are inadvertently overlooked prior to the signature. There is any questions about the transcription please contact me. Return in  4 weeks  with  CNP  for further plan of treatment.          Electronically signed by Faby Rebollar MD on 10/18/2018 at 12:50 PM

## 2018-10-25 ENCOUNTER — TELEPHONE (OUTPATIENT)
Dept: CASE MANAGEMENT | Age: 73
End: 2018-10-25

## 2018-10-25 ENCOUNTER — HOSPITAL ENCOUNTER (OUTPATIENT)
Dept: CT IMAGING | Age: 73
Discharge: HOME | End: 2018-10-27
Payer: MEDICARE

## 2018-10-25 DIAGNOSIS — Z87.891 PERSONAL HISTORY OF TOBACCO USE: ICD-10-CM

## 2018-10-25 PROCEDURE — G0297 LDCT FOR LUNG CA SCREEN: HCPCS

## 2019-04-12 ENCOUNTER — TELEPHONE (OUTPATIENT)
Dept: ONCOLOGY | Age: 74
End: 2019-04-12

## 2019-04-24 DIAGNOSIS — Z51.81 MEDICATION MONITORING ENCOUNTER: ICD-10-CM

## 2019-04-24 DIAGNOSIS — F32.A DEPRESSIVE DISORDER: ICD-10-CM

## 2019-04-24 DIAGNOSIS — G47.00 INSOMNIA, UNSPECIFIED TYPE: ICD-10-CM

## 2019-04-24 RX ORDER — BUPROPION HYDROCHLORIDE 300 MG/1
300 TABLET ORAL EVERY MORNING
Qty: 90 TABLET | Refills: 0 | Status: SHIPPED | OUTPATIENT
Start: 2019-04-24 | End: 2019-06-07 | Stop reason: SDUPTHER

## 2019-04-24 RX ORDER — CITALOPRAM 40 MG/1
40 TABLET ORAL NIGHTLY
Qty: 90 TABLET | Refills: 0 | Status: SHIPPED | OUTPATIENT
Start: 2019-04-24 | End: 2019-06-07 | Stop reason: SDUPTHER

## 2019-06-05 ENCOUNTER — TELEPHONE (OUTPATIENT)
Dept: ONCOLOGY | Age: 74
End: 2019-06-05

## 2019-06-05 NOTE — TELEPHONE ENCOUNTER
REVIEWED AUTO PRINTED LUNG SCREENING REMINDER LETTERS AND CHART, PATIENT IS DUE AND NOT SCHEDULED YET. MAILED LETTER.

## 2019-06-07 DIAGNOSIS — F32.A DEPRESSIVE DISORDER: ICD-10-CM

## 2019-06-07 DIAGNOSIS — Z51.81 MEDICATION MONITORING ENCOUNTER: ICD-10-CM

## 2019-06-07 DIAGNOSIS — G47.00 INSOMNIA, UNSPECIFIED TYPE: ICD-10-CM

## 2019-06-11 RX ORDER — CITALOPRAM 40 MG/1
TABLET ORAL
Qty: 90 TABLET | Refills: 0 | Status: SHIPPED | OUTPATIENT
Start: 2019-06-11 | End: 2019-10-17 | Stop reason: SDUPTHER

## 2019-06-11 RX ORDER — BUPROPION HYDROCHLORIDE 300 MG/1
300 TABLET ORAL EVERY MORNING
Qty: 90 TABLET | Refills: 0 | Status: SHIPPED | OUTPATIENT
Start: 2019-06-11 | End: 2019-10-17 | Stop reason: SDUPTHER

## 2019-06-20 ENCOUNTER — OFFICE VISIT (OUTPATIENT)
Dept: PRIMARY CARE CLINIC | Age: 74
End: 2019-06-20
Payer: MEDICARE

## 2019-06-20 VITALS
OXYGEN SATURATION: 98 % | SYSTOLIC BLOOD PRESSURE: 124 MMHG | BODY MASS INDEX: 23.05 KG/M2 | HEART RATE: 84 BPM | WEIGHT: 118 LBS | DIASTOLIC BLOOD PRESSURE: 62 MMHG | TEMPERATURE: 98.5 F

## 2019-06-20 DIAGNOSIS — N18.30 CHRONIC KIDNEY DISEASE, STAGE III (MODERATE) (HCC): ICD-10-CM

## 2019-06-20 DIAGNOSIS — R91.1 LUNG NODULE: ICD-10-CM

## 2019-06-20 DIAGNOSIS — F33.41 RECURRENT MAJOR DEPRESSIVE DISORDER, IN PARTIAL REMISSION (HCC): ICD-10-CM

## 2019-06-20 DIAGNOSIS — R53.82 CHRONIC FATIGUE: Primary | ICD-10-CM

## 2019-06-20 PROCEDURE — 99214 OFFICE O/P EST MOD 30 MIN: CPT | Performed by: FAMILY MEDICINE

## 2019-06-20 PROCEDURE — 1123F ACP DISCUSS/DSCN MKR DOCD: CPT | Performed by: FAMILY MEDICINE

## 2019-06-20 PROCEDURE — 3017F COLORECTAL CA SCREEN DOC REV: CPT | Performed by: FAMILY MEDICINE

## 2019-06-20 PROCEDURE — G8420 CALC BMI NORM PARAMETERS: HCPCS | Performed by: FAMILY MEDICINE

## 2019-06-20 PROCEDURE — G8399 PT W/DXA RESULTS DOCUMENT: HCPCS | Performed by: FAMILY MEDICINE

## 2019-06-20 PROCEDURE — 4004F PT TOBACCO SCREEN RCVD TLK: CPT | Performed by: FAMILY MEDICINE

## 2019-06-20 PROCEDURE — G8427 DOCREV CUR MEDS BY ELIG CLIN: HCPCS | Performed by: FAMILY MEDICINE

## 2019-06-20 PROCEDURE — 1090F PRES/ABSN URINE INCON ASSESS: CPT | Performed by: FAMILY MEDICINE

## 2019-06-20 PROCEDURE — 4040F PNEUMOC VAC/ADMIN/RCVD: CPT | Performed by: FAMILY MEDICINE

## 2019-06-20 ASSESSMENT — ENCOUNTER SYMPTOMS
RESPIRATORY NEGATIVE: 1
BACK PAIN: 1

## 2019-06-20 NOTE — LETTER
[facility]  [address] [facility]  Phone: [phone]  Fax: [phone]    Meño Ma MD        June 20, 2019     Patient: Gildardo Aguilera   YOB: 1945   Date of Visit: 6/20/2019       To Whom It May Concern: It is my medical opinion that Azul Rodgers requires a disability parking placard for the following reasons:  She has limited walking ability due to an arthritic condition. Duration of need: 5 years    If you have any questions or concerns, please don't hesitate to call.     Sincerely,        Meño Ma MD

## 2019-06-20 NOTE — PROGRESS NOTES
Harmeet Lopez a 69 y.o. female who presents today for her medical conditions/complaints as notedbelow. Chief Complaint   Patient presents with    Stress    Fatigue    Abdominal Pain         HPI:   HPI  Abd pain: off and on: mid sternal, lower sternum, burning pain, lasts briefly. Takes some medicine for it and it helps. Twice a month  BM OK. No dysphagia, no food getting caught  Stress: her partner has bladder cancer, CAD, MD, has been to multiple doctors, has been stress. Her mom has medical issues and hallucinations    Fatigue: feels tired all day. Sleeping good. Wakes up tired. She doesn't think she has a sleep disorder. LDL Calculated (mg/dL)   Date Value   06/13/2017 96       (goal LDL is <100)   BUN (mg/dL)   Date Value   08/03/2018 24 (H)     BP Readings from Last 3 Encounters:   06/20/19 124/62   10/18/18 119/64   10/10/18 126/60          (goal 120/80)    Past Medical History:   Diagnosis Date    Anxiety     Cancer [facility])     breast cancer, left       Past Surgical History:   Procedure Laterality Date    APPENDECTOMY      BREAST LUMPECTOMY      right fatty tissue removed    BREAST REDUCTION SURGERY Right     BREAST SURGERY Left     left mastectomy w/reconstruction, tram    CATARACT REMOVAL Bilateral 03/03/2016    CHOLECYSTECTOMY      COLONOSCOPY      COLONOSCOPY  10/13/2016    Dr Ram Ax    TUBAL LIGATION         Family History   Problem Relation Age of Onset    Heart Disease Father        Social History     Tobacco Use    Smoking status: Current Every Day Smoker     Packs/day: 1.00     Years: 32.00     Pack years: 32.00    Smokeless tobacco: Never Used   Substance Use Topics    Alcohol use: Yes     Comment: occ. wine or rum.       Current Outpatient Medications   Medication Sig Dispense Refill    citalopram (CELEXA) 40 MG tablet TAKE 1 TABLET BY MOUTH EVERY NIGHT AT BEDTIME 90 tablet 0    buPROPion (WELLBUTRIN XL) 300 MG extended release tablet TAKE 1 TABLET BY MOUTH EVERY MORNING 90 tablet 0    naproxen sodium (ALEVE) 220 MG tablet Take 220 mg by mouth 2 times daily as needed for Pain      Multiple Vitamins-Minerals (MULTIVITAMIN ADULT PO) Take by mouth      Calcium Carb-Cholecalciferol (CALCIUM + D3) 600-200 MG-UNIT TABS tablet Take 1 tablet by mouth Daily with lunch       No current facility-administered medications for this visit. No Known Allergies    Health Maintenance   Topic Date Due    Shingles Vaccine (3 of 3) 06/23/2018    Low dose CT lung screening  10/25/2019    Breast cancer screen  10/02/2020    Lipid screen  06/13/2022    DTaP/Tdap/Td vaccine (2 - Td) 10/01/2025    Colon cancer screen colonoscopy  10/13/2026    DEXA (modify frequency per FRAX score)  Completed    Flu vaccine  Completed    Pneumococcal 65+ years Vaccine  Completed    Hepatitis C screen  Completed       Subjective:      Review of Systems   Respiratory: Negative. Musculoskeletal: Positive for arthralgias and back pain. Right hip pain , has trouble walking. Objective:     /62   Pulse 84   Temp 98.5 °F (36.9 °C) (Oral)   Wt 118 lb (53.5 kg)   SpO2 98%   BMI 23.05 kg/m²   Physical Exam   Constitutional: She is oriented to person, place, and time. She appears well-developed and well-nourished. No distress. HENT:   Head: Normocephalic and atraumatic. Right Ear: External ear normal.   Left Ear: External ear normal.   Mouth/Throat: Oropharynx is clear and moist. No oropharyngeal exudate. Eyes: Pupils are equal, round, and reactive to light. Conjunctivae are normal. Right eye exhibits no discharge. Left eye exhibits no discharge. No scleral icterus. Neck: No tracheal deviation present. No thyromegaly present. Cardiovascular: Normal rate, regular rhythm, normal heart sounds and intact distal pulses. No carotid bruits   Pulmonary/Chest: Effort normal and breath sounds normal. No respiratory distress. She has no wheezes.    Musculoskeletal: She exhibits no edema.   Lymphadenopathy:     She has no cervical adenopathy. Neurological: She is alert and oriented to person, place, and time. Skin: Skin is warm. Capillary refill takes less than 2 seconds. No rash noted. Psychiatric: She has a normal mood and affect. Her behavior is normal. Thought content normal.   Nursing note and vitals reviewed. Assessment:       Diagnosis Orders   1. Chronic fatigue  Basic Metabolic Panel, Fasting    TSH With Reflex Ft4    CBC With Auto Differential   2. Recurrent major depressive disorder, in partial remission (HCC)  TSH With Reflex Ft4    CBC With Auto Differential   3. Chronic kidney disease, stage III (moderate) (HCC)  Basic Metabolic Panel, Fasting    TSH With Reflex Ft4    CBC With Auto Differential   4. Lung nodule  Low Dose Chest CT -Abnormal Lung Screen Follow up        Plan:      Return in about 6 months (around 12/20/2019). consider counseling. Discussed depression  Discussed possible sleep disorder. Orders Placed This Encounter   Procedures    Low Dose Chest CT -Abnormal Lung Screen Follow up     Age: 69 y.o. Smoking History:   Social History    Tobacco Use      Smoking status: Current Every Day Smoker        Packs/day: 1.00        Years: 32.00        Pack years: 28      Smokeless tobacco: Never Used    Alcohol use: Yes      Comment: occ. wine or rum. Drug use: No    Pack years: 28  Last CT lung screen: 10/25 5379     Standing Status:   Future     Standing Expiration Date:   6/20/2020    Basic Metabolic Panel, Fasting     Standing Status:   Future     Standing Expiration Date:   12/20/2019    TSH With Reflex Ft4     Standing Status:   Future     Standing Expiration Date:   6/20/2020    CBC With Auto Differential     Standing Status:   Future     Standing Expiration Date:   6/20/2020     No orders of the defined types were placed in this encounter. Patient given educational materials - see patient instructions.   Discussed use, benefit, and side effects of prescribed medications. All patient questions answered. Pt voiced understanding. Reviewed health maintenance. Instructed to continue current medications, diet and exercise. Patient agreed with treatment plan. Follow up as directed.      Electronicallysigned by Terence Head MD on 6/20/2019 at 11:43 AM

## 2019-06-21 ENCOUNTER — HOSPITAL ENCOUNTER (OUTPATIENT)
Dept: PAIN MANAGEMENT | Age: 74
Discharge: HOME | End: 2019-06-21
Payer: MEDICARE

## 2019-06-21 VITALS
SYSTOLIC BLOOD PRESSURE: 122 MMHG | DIASTOLIC BLOOD PRESSURE: 60 MMHG | BODY MASS INDEX: 23.16 KG/M2 | RESPIRATION RATE: 16 BRPM | HEIGHT: 60 IN | WEIGHT: 118 LBS | HEART RATE: 82 BPM | TEMPERATURE: 98.4 F | OXYGEN SATURATION: 99 %

## 2019-06-21 DIAGNOSIS — Z51.81 MEDICATION MONITORING ENCOUNTER: ICD-10-CM

## 2019-06-21 DIAGNOSIS — M51.37 DEGENERATION OF LUMBAR OR LUMBOSACRAL INTERVERTEBRAL DISC: Primary | Chronic | ICD-10-CM

## 2019-06-21 DIAGNOSIS — M54.16 LUMBAR RADICULOPATHY, CHRONIC: ICD-10-CM

## 2019-06-21 DIAGNOSIS — M48.061 SPINAL STENOSIS, LUMBAR REGION, WITHOUT NEUROGENIC CLAUDICATION: Chronic | ICD-10-CM

## 2019-06-21 DIAGNOSIS — M47.817 LUMBOSACRAL SPONDYLOSIS WITHOUT MYELOPATHY: Chronic | ICD-10-CM

## 2019-06-21 PROCEDURE — 99213 OFFICE O/P EST LOW 20 MIN: CPT | Performed by: NURSE PRACTITIONER

## 2019-06-21 PROCEDURE — 99213 OFFICE O/P EST LOW 20 MIN: CPT

## 2019-06-21 RX ORDER — HYDROCODONE BITARTRATE AND ACETAMINOPHEN 5; 325 MG/1; MG/1
1 TABLET ORAL 2 TIMES DAILY PRN
Qty: 60 TABLET | Refills: 0 | Status: SHIPPED | OUTPATIENT
Start: 2019-06-21 | End: 2019-07-17 | Stop reason: SDUPTHER

## 2019-06-21 ASSESSMENT — ENCOUNTER SYMPTOMS
GASTROINTESTINAL NEGATIVE: 1
EYES NEGATIVE: 1
BACK PAIN: 1

## 2019-06-21 NOTE — PROGRESS NOTES
Alexy Rae PROGRESS NOTE      Patient here today to review Medication Agreement    Chief Complaint:  Low back pain      HPI:She c/o low back pain which radiates down left leg. No history lumbar surgery, She sees a Chiropractor for her back. She was in [place] from October to May. Sleep is good. No Ed visits. She continues to smoke. Back Pain   This is a chronic problem. The current episode started more than 1 year ago. The problem occurs constantly. The problem is unchanged. The pain is present in the lumbar spine. Quality: sharp. The pain does not radiate. The pain is at a severity of 3/10. The pain is the same all the time. The symptoms are aggravated by bending and standing (walking too much). Risk factors include history of osteoporosis. She has tried analgesics, chiropractic manipulation and bed rest for the symptoms. The treatment provided mild relief. Patient denies any new neurological symptoms. No bowel or bladder incontinence, no weakness, and no falling. Treatment goals:  Functional status: for pain not to get worse      Aberrancy:   Any alcoholic beverages  no     Any illegal drugs   no      Analgesia:pain 3      Adverse  Effects none bM daily:    ADL;s :active, housework,  homeexercises        Pill count: appropriate  Did not bring bottle script last filled 10/2018    Morphine equivalent dose as reported on [place]AR[place]:10  Periodic Controlled Substance Monitoring: Possible medication side effects, risk of tolerance/dependence & alternative treatments discussed., No signs of potential drug abuse or diversion identified. Agency [place], APRN - CNP)  Review of[place]ARRS does not show any aberrant prescription behavior. Medication is helping the patient stay active. Patient denies any side effects and reports adequate analgesia. No sign of misuse/abuse.         When was thelast UDS: 4-25-18            Was the UDS appropriate:yes      Record/Diagnostics Review:      As above, I did 04/25/2018  9:45 AM [facility]   Drugs Expected, Ur [address] 4/25   Final 04/25/2018  9:45 AM [facility] [address] Lab   Creatinine, Ur 187.2  20.0 - 400.0 mg/dL Final 04/25/2018  9:45 AM [facility]   Pain Mgt Drug Panel, Hi Res, Ur See Below   Final 04/25/2018  9:45 AM [facility]   (NOTE)   Methodology: Qualitative Enzyme Immunoassay and Qualitative Liquid   Chromatography-Time of Flight-Mass Spectrometry or Tandem Mass   Spectrometry, Quantitative Spectrophotometry   The absence of expected drug(s) and/or drug metabolite(s) may   indicate non-compliance, inappropriate timing of specimen   collection relative to drug administration, poor drug absorption,   diluted/adulterated urine, or limitations of testing. The   concentration must be greater than or equal to the cutoff to be   reported as present.  If specific drug concentrations are   required, contact the laboratory within two weeks of specimen   collection to request quantification by a second analytical   technique. Interpretive questions should be directed to the   laboratory. Results based on immunoassay detection that do not match clinical   expectations should be   interpreted with caution. Confirmatory testing by mass   spectrometry for immunoassay-based results is available, if   ordered within two weeks of specimen collection. Additional   charges apply. For medical purposes only; not valid for forensic use. This test was developed and its performance characteristics   determined by [facility]. The U.S. Food and Drug   Administration has not approved or cleared this test; however, FDA   clearance or approval is not currently required for clinical use. The results are not intended to be used as the sole means for   clinical diagnosis or patient management decisions.     EER Hi Res Interp Ur See Note   Final 04/25/2018  9:45 AM [facility]   (NOTE)   Access [facility] Enhanced Report using either link below:   -Direct access: [email or web address]   -Enter Username, Password: [email or web address]   Username: [email or web address] Password: [email or web address]   Performed by Santosh Goldsmith   [place] , [address] [facility] [phone]   www. Heron Richard MD, Lab. Director   Performed at [facility]: PHANI KAY LIZZY [address] [phone]    Testing Performed By             Past Medical History:   Diagnosis Date    Anxiety     Cancer [facility])     breast cancer, left        Past Surgical History:   Procedure Laterality Date    APPENDECTOMY      BREAST LUMPECTOMY      right fatty tissue removed    BREAST REDUCTION SURGERY Right     BREAST SURGERY Left     left mastectomy w/reconstruction, tram    CATARACT REMOVAL Bilateral 03/03/2016    CHOLECYSTECTOMY      COLONOSCOPY      COLONOSCOPY  10/13/2016    Dr Wendy Branch TUBAL LIGATION         No Known Allergies      Current Outpatient Medications:     HYDROcodone-acetaminophen (NORCO) 5-325 MG per tablet, Take 1 tablet by mouth 2 times daily as needed for Pain for up to 30 days. , Disp: 60 tablet, Rfl: 0    citalopram (CELEXA) 40 MG tablet, TAKE 1 TABLET BY MOUTH EVERY NIGHT AT BEDTIME, Disp: 90 tablet, Rfl: 0    buPROPion (WELLBUTRIN XL) 300 MG extended release tablet, TAKE 1 TABLET BY MOUTH EVERY MORNING, Disp: 90 tablet, Rfl: 0    Multiple Vitamins-Minerals (MULTIVITAMIN ADULT PO), Take by mouth, Disp: , Rfl:     Calcium Carb-Cholecalciferol (CALCIUM + D3) 600-200 MG-UNIT TABS tablet, Take 1 tablet by mouth Daily with lunch, Disp: , Rfl:     naproxen sodium (ALEVE) 220 MG tablet, Take 220 mg by mouth 2 times daily as needed for Pain, Disp: , Rfl:     Family History   Problem Relation Age of Onset    Heart Disease Father        Social History     Socioeconomic History    Marital status:      Spouse name: Not on file    Number of children: 2    Years of education: 12    Highest education level: Not on file had asked the patient to minimize medication use and utilize pain medications only for uncontrolled rest pain or pain with exertional activities. I advised patient not to self-escalate painmedications without consulting with us. At each of patient's future visits we will try to taper pain medications, while adjusting the adjunct medications, and re-evaluating for Physical Therapy to improve spinal andjoint strength. We will continue to have discussions to decrease pain medications as tolerated. Counseled patient on effects their pain medication and /or their medical condition mayhave on their  ability to drive or operate machinery. Instructed not to drive or operate machinery if drowsy     I also discussed with the patient regarding the dangers of combining narcotic pain medication with tranquilizers, alcohol or illegal drugs or taking the medication any way other than prescribed. The dangers were discussed  including respiratory depression and death. Patient was told to tell  all  physicians regarding the medications he is getting from pain clinic. Patient is warned not to take any unprescribed medications over-the-countermedications that can depress breathing . Patient is advised to talk to the pharmacist or physicians if planning to take any over-the-counter medications before  takeing them. Patient is strongly advised to avoid tranquilizers or  relaxants, illegal drugs  or any medications that can depress breathing  Patient is also advised to tell us if there is any changes in their medications from other physicians.     1. Med Agreeement updated    TREATMENT OPTIONS:     Return in 4 weeks  Medication Agreement Requirements Met  Continue Opioid therapy  Script written for hydrocodone  Follow up appointment made

## 2019-07-12 ENCOUNTER — HOSPITAL ENCOUNTER (OUTPATIENT)
Age: 74
Discharge: HOME | End: 2019-07-12
Payer: MEDICARE

## 2019-07-12 ENCOUNTER — HOSPITAL ENCOUNTER (OUTPATIENT)
Dept: CT IMAGING | Age: 74
Discharge: HOME | End: 2019-07-14
Payer: MEDICARE

## 2019-07-12 DIAGNOSIS — D50.8 OTHER IRON DEFICIENCY ANEMIA: ICD-10-CM

## 2019-07-12 DIAGNOSIS — R53.82 CHRONIC FATIGUE: ICD-10-CM

## 2019-07-12 DIAGNOSIS — R91.1 LUNG NODULE: ICD-10-CM

## 2019-07-12 DIAGNOSIS — N18.30 CHRONIC KIDNEY DISEASE, STAGE III (MODERATE) (HCC): ICD-10-CM

## 2019-07-12 DIAGNOSIS — N18.30 CHRONIC KIDNEY DISEASE, STAGE III (MODERATE) (HCC): Primary | ICD-10-CM

## 2019-07-12 DIAGNOSIS — F33.41 RECURRENT MAJOR DEPRESSIVE DISORDER, IN PARTIAL REMISSION (HCC): ICD-10-CM

## 2019-07-12 LAB
ABSOLUTE EOS #: 0.1 K/UL (ref 0–0.4)
ABSOLUTE IMMATURE GRANULOCYTE: ABNORMAL K/UL (ref 0–0.3)
ABSOLUTE LYMPH #: 2.6 K/UL (ref 1–4.8)
ABSOLUTE MONO #: 0.4 K/UL (ref 0.1–1.3)
ANION GAP SERPL CALCULATED.3IONS-SCNC: 13 MMOL/L (ref 9–17)
BASOPHILS # BLD: 1 % (ref 0–2)
BASOPHILS ABSOLUTE: 0.1 K/UL (ref 0–0.2)
BUN BLDV-MCNC: 21 MG/DL (ref 8–23)
BUN/CREAT BLD: ABNORMAL (ref 9–20)
CALCIUM SERPL-MCNC: 9.3 MG/DL (ref 8.6–10.4)
CHLORIDE BLD-SCNC: 105 MMOL/L (ref 98–107)
CO2: 22 MMOL/L (ref 20–31)
CREAT SERPL-MCNC: 1.47 MG/DL (ref 0.5–0.9)
DIFFERENTIAL TYPE: ABNORMAL
EOSINOPHILS RELATIVE PERCENT: 2 % (ref 0–4)
GFR AFRICAN AMERICAN: 42 ML/MIN
GFR NON-AFRICAN AMERICAN: 35 ML/MIN
GFR SERPL CREATININE-BSD FRML MDRD: ABNORMAL ML/MIN/{1.73_M2}
GFR SERPL CREATININE-BSD FRML MDRD: ABNORMAL ML/MIN/{1.73_M2}
GLUCOSE FASTING: 104 MG/DL (ref 70–99)
HCT VFR BLD CALC: 31.4 % (ref 36–46)
HEMOGLOBIN: 10.4 G/DL (ref 12–16)
IMMATURE GRANULOCYTES: ABNORMAL %
LYMPHOCYTES # BLD: 41 % (ref 24–44)
MCH RBC QN AUTO: 29.5 PG (ref 26–34)
MCHC RBC AUTO-ENTMCNC: 33.1 G/DL (ref 31–37)
MCV RBC AUTO: 89.2 FL (ref 80–100)
MONOCYTES # BLD: 6 % (ref 1–7)
NRBC AUTOMATED: ABNORMAL PER 100 WBC
PDW BLD-RTO: 15 % (ref 11.5–14.9)
PLATELET # BLD: 257 K/UL (ref 150–450)
PLATELET ESTIMATE: ABNORMAL
PMV BLD AUTO: 8.4 FL (ref 6–12)
POTASSIUM SERPL-SCNC: 4.4 MMOL/L (ref 3.7–5.3)
RBC # BLD: 3.52 M/UL (ref 4–5.2)
RBC # BLD: ABNORMAL 10*6/UL
SEG NEUTROPHILS: 50 % (ref 36–66)
SEGMENTED NEUTROPHILS ABSOLUTE COUNT: 3.1 K/UL (ref 1.3–9.1)
SODIUM BLD-SCNC: 140 MMOL/L (ref 135–144)
TSH SERPL DL<=0.05 MIU/L-ACNC: 3.15 MIU/L (ref 0.3–5)
WBC # BLD: 6.2 K/UL (ref 3.5–11)
WBC # BLD: ABNORMAL 10*3/UL

## 2019-07-12 PROCEDURE — 84443 ASSAY THYROID STIM HORMONE: CPT

## 2019-07-12 PROCEDURE — 80048 BASIC METABOLIC PNL TOTAL CA: CPT

## 2019-07-12 PROCEDURE — 36415 COLL VENOUS BLD VENIPUNCTURE: CPT

## 2019-07-12 PROCEDURE — 85025 COMPLETE CBC W/AUTO DIFF WBC: CPT

## 2019-07-12 PROCEDURE — 71250 CT THORAX DX C-: CPT

## 2019-07-17 ENCOUNTER — HOSPITAL ENCOUNTER (OUTPATIENT)
Dept: PAIN MANAGEMENT | Age: 74
Discharge: HOME | End: 2019-07-17
Payer: MEDICARE

## 2019-07-17 VITALS
OXYGEN SATURATION: 100 % | SYSTOLIC BLOOD PRESSURE: 129 MMHG | TEMPERATURE: 98.1 F | HEART RATE: 86 BPM | BODY MASS INDEX: 23.16 KG/M2 | DIASTOLIC BLOOD PRESSURE: 63 MMHG | WEIGHT: 118 LBS | HEIGHT: 60 IN | RESPIRATION RATE: 16 BRPM

## 2019-07-17 DIAGNOSIS — M46.1 SACROILIITIS (HCC): ICD-10-CM

## 2019-07-17 DIAGNOSIS — M54.16 LUMBAR RADICULOPATHY, CHRONIC: ICD-10-CM

## 2019-07-17 DIAGNOSIS — M51.37 DEGENERATION OF LUMBAR OR LUMBOSACRAL INTERVERTEBRAL DISC: Chronic | ICD-10-CM

## 2019-07-17 DIAGNOSIS — M47.817 LUMBOSACRAL SPONDYLOSIS WITHOUT MYELOPATHY: Chronic | ICD-10-CM

## 2019-07-17 DIAGNOSIS — Z51.81 MEDICATION MONITORING ENCOUNTER: Primary | ICD-10-CM

## 2019-07-17 DIAGNOSIS — M48.061 SPINAL STENOSIS, LUMBAR REGION, WITHOUT NEUROGENIC CLAUDICATION: Chronic | ICD-10-CM

## 2019-07-17 PROCEDURE — 99213 OFFICE O/P EST LOW 20 MIN: CPT

## 2019-07-17 PROCEDURE — 80307 DRUG TEST PRSMV CHEM ANLYZR: CPT

## 2019-07-17 PROCEDURE — 99214 OFFICE O/P EST MOD 30 MIN: CPT | Performed by: PAIN MEDICINE

## 2019-07-17 RX ORDER — HYDROCODONE BITARTRATE AND ACETAMINOPHEN 5; 325 MG/1; MG/1
1 TABLET ORAL 2 TIMES DAILY PRN
Qty: 60 TABLET | Refills: 0 | Status: SHIPPED | OUTPATIENT
Start: 2019-07-17 | End: 2019-08-16 | Stop reason: SDUPTHER

## 2019-07-17 ASSESSMENT — PAIN DESCRIPTION - PAIN TYPE: TYPE: CHRONIC PAIN

## 2019-07-17 ASSESSMENT — PAIN DESCRIPTION - DESCRIPTORS: DESCRIPTORS: ACHING;CONSTANT

## 2019-07-17 ASSESSMENT — ENCOUNTER SYMPTOMS
EYES NEGATIVE: 1
GASTROINTESTINAL NEGATIVE: 1
BACK PAIN: 1
RESPIRATORY NEGATIVE: 1
ALLERGIC/IMMUNOLOGIC NEGATIVE: 1

## 2019-07-17 ASSESSMENT — PAIN DESCRIPTION - ORIENTATION: ORIENTATION: RIGHT;LEFT;LOWER

## 2019-07-17 ASSESSMENT — PAIN DESCRIPTION - PROGRESSION: CLINICAL_PROGRESSION: GRADUALLY WORSENING

## 2019-07-17 ASSESSMENT — PAIN SCALES - GENERAL: PAINLEVEL_OUTOF10: 2

## 2019-07-17 ASSESSMENT — PAIN DESCRIPTION - ONSET: ONSET: ON-GOING

## 2019-07-17 ASSESSMENT — PAIN DESCRIPTION - LOCATION: LOCATION: BACK;HIP

## 2019-07-17 ASSESSMENT — PAIN DESCRIPTION - FREQUENCY: FREQUENCY: CONTINUOUS

## 2019-07-17 NOTE — PROGRESS NOTES
(Positive cutoff 200 ng/mL)              Performed at [address] [facility]. [place], [address] [facility]   [phone]        Imaging:  Radiology Images and Reports reviewed where indicated and necessary    Patient Active Problem List   Diagnosis    Degeneration of lumbar or lumbosacral intervertebral disc    Thoracic or lumbosacral neuritis or radiculitis, unspecified    Lumbosacral spondylosis without myelopathy    Spinal stenosis, lumbar region, without neurogenic claudication    Medication monitoring encounter    Lumbar radiculopathy, chronic    Insomnia    Elevated serum creatinine    Absolute anemia    Anxiety    Depressive disorder        ASSESSMENT    Padmini Bowers is a 69 y.o. female with     1. Medication monitoring encounter    2. Degeneration of lumbar or lumbosacral intervertebral disc    3. Lumbosacral spondylosis without myelopathy    4. Spinal stenosis, lumbar region, without neurogenic claudication    5. Lumbar radiculopathy, chronic    6. Sacroiliitis (HCC)-right           PLAN    We will continue current pain medications  Current medications are being tolerated without any Adverse side effects. Orders Placed This Encounter   Medications    HYDROcodone-acetaminophen (NORCO) 5-325 MG per tablet     Sig: Take 1 tablet by mouth 2 times daily as needed for Pain for up to 30 days. Dispense:  60 tablet     Refill:  0     Reduce doses taken as pain becomes manageable     Urine drug screens have been appropriate. No aberrant activity noted. Analgesia is achieved. Activities of daily living are possible because of medications. Safe use of medications explained to patient. Counselling/Preventive measures for pain  Control:    [x]  Spine strengthening exercises are discussed with patient in detail.      [x] Ill effects of being on chronic pain medications such as sleep disturbances, hormonal changes, withdrawal symptoms,  chronic opioid dependence and tolerance

## 2019-07-18 ASSESSMENT — ENCOUNTER SYMPTOMS
PHOTOPHOBIA: 0
NAUSEA: 0
SHORTNESS OF BREATH: 0
BOWEL INCONTINENCE: 0
CONSTIPATION: 0
EYE REDNESS: 0
ABDOMINAL PAIN: 0
COUGH: 0
VOMITING: 0
COLOR CHANGE: 0

## 2019-07-20 LAB
6-ACETYLMORPHINE, UR: NOT DETECTED
7-AMINOCLONAZEPAM, URINE: NOT DETECTED
ALPHA-OH-ALPRAZ, URINE: NOT DETECTED
ALPRAZOLAM, URINE: NOT DETECTED
AMPHETAMINES, URINE: NOT DETECTED
BARBITURATES, URINE: NOT DETECTED
BENZOYLECGONINE, UR: NOT DETECTED
BUPRENORPHINE URINE: NOT DETECTED
CARISOPRODOL, UR: NOT DETECTED
CLONAZEPAM, URINE: NOT DETECTED
CODEINE, URINE: NOT DETECTED
CREATININE URINE: 59.1 MG/DL (ref 20–400)
DIAZEPAM, URINE: NOT DETECTED
DRUGS EXPECTED, UR: NORMAL
EER HI RES INTERP UR: NORMAL
ETHYL GLUCURONIDE UR: NOT DETECTED
FENTANYL URINE: NOT DETECTED
HYDROCODONE, URINE: NOT DETECTED
HYDROMORPHONE, URINE: NOT DETECTED
LORAZEPAM, URINE: NOT DETECTED
MARIJUANA METAB, UR: NOT DETECTED
MDA, UR: NOT DETECTED
MDEA, EVE, UR: NOT DETECTED
MDMA URINE: NOT DETECTED
MEPERIDINE METAB, UR: NOT DETECTED
METHADONE, URINE: NOT DETECTED
METHAMPHETAMINE, URINE: NOT DETECTED
METHYLPHENIDATE: NOT DETECTED
MIDAZOLAM, URINE: NOT DETECTED
MORPHINE URINE: NOT DETECTED
NORBUPRENORPHINE, URINE: NOT DETECTED
NORDIAZEPAM, URINE: NOT DETECTED
NORFENTANYL, URINE: NOT DETECTED
NORHYDROCODONE, URINE: NOT DETECTED
NOROXYCODONE, URINE: NOT DETECTED
NOROXYMORPHONE, URINE: NOT DETECTED
OXAZEPAM, URINE: NOT DETECTED
OXYCODONE URINE: NOT DETECTED
OXYMORPHONE, URINE: NOT DETECTED
PAIN MANAGEMENT DRUG PANEL INTERP, URINE: NORMAL
PAIN MGT DRUG PANEL, HI RES, UR: NORMAL
PCP,URINE: NOT DETECTED
PHENTERMINE, UR: NOT DETECTED
PROPOXYPHENE, URINE: NOT DETECTED
TAPENTADOL, URINE: NOT DETECTED
TAPENTADOL-O-SULFATE, URINE: NOT DETECTED
TEMAZEPAM, URINE: NOT DETECTED
TRAMADOL, URINE: NOT DETECTED
ZOLPIDEM, URINE: NOT DETECTED

## 2019-07-25 ENCOUNTER — HOSPITAL ENCOUNTER (OUTPATIENT)
Age: 74
Discharge: HOME | End: 2019-07-25
Payer: MEDICARE

## 2019-07-25 DIAGNOSIS — N18.30 CHRONIC KIDNEY DISEASE, STAGE III (MODERATE) (HCC): ICD-10-CM

## 2019-07-25 DIAGNOSIS — D50.8 OTHER IRON DEFICIENCY ANEMIA: ICD-10-CM

## 2019-07-25 DIAGNOSIS — R79.89 ELEVATED SERUM CREATININE: Primary | ICD-10-CM

## 2019-07-25 LAB
CREAT SERPL-MCNC: 1.59 MG/DL (ref 0.5–0.9)
GFR AFRICAN AMERICAN: 39 ML/MIN
GFR NON-AFRICAN AMERICAN: 32 ML/MIN
GFR SERPL CREATININE-BSD FRML MDRD: ABNORMAL ML/MIN/{1.73_M2}
GFR SERPL CREATININE-BSD FRML MDRD: ABNORMAL ML/MIN/{1.73_M2}
HCT VFR BLD CALC: 30.9 % (ref 36–46)
HEMOGLOBIN: 10.1 G/DL (ref 12–16)

## 2019-07-25 PROCEDURE — 85014 HEMATOCRIT: CPT

## 2019-07-25 PROCEDURE — 85018 HEMOGLOBIN: CPT

## 2019-07-25 PROCEDURE — 82565 ASSAY OF CREATININE: CPT

## 2019-07-25 PROCEDURE — 36415 COLL VENOUS BLD VENIPUNCTURE: CPT

## 2019-08-16 ENCOUNTER — HOSPITAL ENCOUNTER (OUTPATIENT)
Dept: PAIN MANAGEMENT | Age: 74
Discharge: HOME | End: 2019-08-16
Payer: MEDICARE

## 2019-08-16 VITALS
HEIGHT: 60 IN | SYSTOLIC BLOOD PRESSURE: 124 MMHG | RESPIRATION RATE: 16 BRPM | WEIGHT: 118 LBS | BODY MASS INDEX: 23.16 KG/M2 | OXYGEN SATURATION: 95 % | TEMPERATURE: 98.5 F | HEART RATE: 68 BPM | DIASTOLIC BLOOD PRESSURE: 65 MMHG

## 2019-08-16 DIAGNOSIS — M47.817 LUMBOSACRAL SPONDYLOSIS WITHOUT MYELOPATHY: ICD-10-CM

## 2019-08-16 DIAGNOSIS — M54.16 LUMBAR RADICULOPATHY, CHRONIC: ICD-10-CM

## 2019-08-16 DIAGNOSIS — F41.9 ANXIETY: Chronic | ICD-10-CM

## 2019-08-16 DIAGNOSIS — M48.061 SPINAL STENOSIS, LUMBAR REGION, WITHOUT NEUROGENIC CLAUDICATION: ICD-10-CM

## 2019-08-16 DIAGNOSIS — Z51.81 MEDICATION MONITORING ENCOUNTER: ICD-10-CM

## 2019-08-16 DIAGNOSIS — M51.37 DEGENERATION OF LUMBAR OR LUMBOSACRAL INTERVERTEBRAL DISC: Primary | Chronic | ICD-10-CM

## 2019-08-16 PROCEDURE — 99213 OFFICE O/P EST LOW 20 MIN: CPT | Performed by: NURSE PRACTITIONER

## 2019-08-16 PROCEDURE — 99213 OFFICE O/P EST LOW 20 MIN: CPT

## 2019-08-16 RX ORDER — HYDROCODONE BITARTRATE AND ACETAMINOPHEN 5; 325 MG/1; MG/1
1 TABLET ORAL 2 TIMES DAILY PRN
Qty: 60 TABLET | Refills: 0 | Status: SHIPPED | OUTPATIENT
Start: 2019-08-20 | End: 2019-09-17 | Stop reason: SDUPTHER

## 2019-08-16 ASSESSMENT — ENCOUNTER SYMPTOMS
BACK PAIN: 1
EYES NEGATIVE: 1
GASTROINTESTINAL NEGATIVE: 1

## 2019-08-16 NOTE — PROGRESS NOTES
[email or web address]   -Enter Username, Password: [email or web address]   Username: [email or web address]   Password: [email or web address]   Performed by [id number] [place] [address] [phone]   www. Rosa Reyes MD, Lab. Past Medical History:   Diagnosis Date    Anxiety     Cancer [facility])     breast cancer, left     CKD (chronic kidney disease) stage 3, GFR 30-59 ml/min (HCC)        Past Surgical History:   Procedure Laterality Date    APPENDECTOMY      BREAST LUMPECTOMY      right fatty tissue removed    BREAST REDUCTION SURGERY Right     BREAST SURGERY Left     left mastectomy w/reconstruction, tram    CATARACT REMOVAL Bilateral 03/03/2016    CHOLECYSTECTOMY      COLONOSCOPY      COLONOSCOPY  10/13/2016    Dr Sony Valles         No Known Allergies      Current Outpatient Medications:     [START ON 8/20/2019] HYDROcodone-acetaminophen (NORCO) 5-325 MG per tablet, Take 1 tablet by mouth 2 times daily as needed for Pain for up to 30 days. , Disp: 60 tablet, Rfl: 0    citalopram (CELEXA) 40 MG tablet, TAKE 1 TABLET BY MOUTH EVERY NIGHT AT BEDTIME, Disp: 90 tablet, Rfl: 0    buPROPion (WELLBUTRIN XL) 300 MG extended release tablet, TAKE 1 TABLET BY MOUTH EVERY MORNING, Disp: 90 tablet, Rfl: 0    Multiple Vitamins-Minerals (MULTIVITAMIN ADULT PO), Take by mouth, Disp: , Rfl:     Calcium Carb-Cholecalciferol (CALCIUM + D3) 600-200 MG-UNIT TABS tablet, Take 1 tablet by mouth Daily with lunch, Disp: , Rfl:     naproxen sodium (ALEVE) 220 MG tablet, Take 220 mg by mouth 2 times daily as needed for Pain, Disp: , Rfl:     Family History   Problem Relation Age of Onset    Heart Disease Father        Social History     Socioeconomic History    Marital status:      Spouse name: Not on file    Number of children: 2    Years of education: 12    Highest education level: Not on file   Occupational History    Occupation: retired   Social Needs Neck: Normal range of motion. Neck supple. Pulmonary/Chest: Effort normal.   Neurological: She is alert. She has normal strength. Reflex Scores:       Patellar reflexes are 2+ on the right side and 2+ on the left side. Achilles reflexes are 2+ on the right side and 2+ on the left side. Skin: Skin is warm, dry and intact. Psychiatric: She has a normal mood and affect. Her speech is normal and behavior is normal. Judgment and thought content normal. Cognition and memory are normal.         Assessment:    Problem List Items Addressed This Visit     Spinal stenosis, lumbar region, without neurogenic claudication (Chronic)    Relevant Medications    HYDROcodone-acetaminophen ([address]) 5-325 MG per tablet (Start on 8/20/2019)    Medication monitoring encounter    Lumbosacral spondylosis without myelopathy (Chronic)    Relevant Medications    HYDROcodone-acetaminophen (NORCO) 5-325 MG per tablet (Start on 8/20/2019)    Lumbar radiculopathy, chronic    Relevant Medications    HYDROcodone-acetaminophen (NORCO) 5-325 MG per tablet (Start on 8/20/2019)    Degeneration of lumbar or lumbosacral intervertebral disc - Primary (Chronic)    Relevant Medications    HYDROcodone-acetaminophen (NORCO) 5-325 MG per tablet (Start on 8/20/2019)    Anxiety (Chronic)            Treatment Plan:  DISCUSSION: Treatment options discussed withpatient and all questions answered to patient's satisfaction. Possible side effects, risk of tolerance and or dependence and alternative treatments discussed    Obtaining appropriate analgesic effect of treatment   No signs of potential drug abuse or diversion identified    [x] Ill effects of being on chronic pain medications such as sleep disturbances, respiratory depression, hormonal changes, withdrawal symptoms, chronic opioid dependence and tolerance as well as risk of taking opioids with Benzodiazepines and taking opioids along with alcohol,  werediscussed with patient.  I had asked the patient to minimize medication use and utilize pain medications only for uncontrolled rest pain or pain with exertional activities. I advised patient not to self-escalate painmedications without consulting with us. At each of patient's future visits we will try to taper pain medications, while adjusting the adjunct medications, and re-evaluating for Physical Therapy to improve spinal andjoint strength. We will continue to have discussions to decrease pain medications as tolerated. Counseled patient on effects their pain medication and /or their medical condition mayhave on their  ability to drive or operate machinery. Instructed not to drive or operate machinery if drowsy     I also discussed with the patient regarding the dangers of combining narcotic pain medication with tranquilizers, alcohol or illegal drugs or taking the medication any way other than prescribed. The dangers were discussed  including respiratory depression and death. Patient was told to tell  all  physicians regarding the medications he is getting from pain clinic. Patient is warned not to take any unprescribed medications over-the-countermedications that can depress breathing . Patient is advised to talk to the pharmacist or physicians if planning to take any over-the-counter medications before  takeing them. Patient is strongly advised to avoid tranquilizers or  relaxants, illegal drugs  or any medications that can depress breathing  Patient is also advised to tell us if there is any changes in their medications from other physicians.     1.discussed UDT results negativem states  Sometimes only takes 1 tab a day  and had not taken for 2 days prior UDT    TREATMENT OPTIONS:     Return in 4 weeks  Medication Agreement Requirements Met  Continue Opioid therapy  Script written for  hydrocodone  Follow up appointment made

## 2019-08-20 ENCOUNTER — HOSPITAL ENCOUNTER (OUTPATIENT)
Age: 74
Discharge: HOME | End: 2019-08-20
Payer: MEDICARE

## 2019-08-20 DIAGNOSIS — N18.30 CKD (CHRONIC KIDNEY DISEASE), STAGE III (HCC): ICD-10-CM

## 2019-08-20 LAB
ABSOLUTE BANDS #: 0.05 K/UL (ref 0–1)
ABSOLUTE EOS #: 0.22 K/UL (ref 0–0.4)
ABSOLUTE IMMATURE GRANULOCYTE: ABNORMAL K/UL (ref 0–0.3)
ABSOLUTE LYMPH #: 2.38 K/UL (ref 1–4.8)
ABSOLUTE MONO #: 0.49 K/UL (ref 0.1–1.3)
ANION GAP SERPL CALCULATED.3IONS-SCNC: 11 MMOL/L (ref 9–17)
BANDS: 1 % (ref 0–10)
BASOPHILS # BLD: 1 % (ref 0–2)
BASOPHILS ABSOLUTE: 0.05 K/UL (ref 0–0.2)
BUN BLDV-MCNC: 15 MG/DL (ref 8–23)
CALCIUM SERPL-MCNC: 9.2 MG/DL (ref 8.6–10.4)
CHLORIDE BLD-SCNC: 106 MMOL/L (ref 98–107)
CO2: 23 MMOL/L (ref 20–31)
CREAT SERPL-MCNC: 1.38 MG/DL (ref 0.5–0.9)
DIFFERENTIAL TYPE: ABNORMAL
EOSINOPHILS RELATIVE PERCENT: 4 % (ref 0–4)
GFR AFRICAN AMERICAN: 45 ML/MIN
GFR NON-AFRICAN AMERICAN: 37 ML/MIN
GFR SERPL CREATININE-BSD FRML MDRD: ABNORMAL ML/MIN/{1.73_M2}
GFR SERPL CREATININE-BSD FRML MDRD: ABNORMAL ML/MIN/{1.73_M2}
HCT VFR BLD CALC: 32.1 % (ref 36–46)
HEMOGLOBIN: 10.6 G/DL (ref 12–16)
IMMATURE GRANULOCYTES: ABNORMAL %
LYMPHOCYTES # BLD: 44 % (ref 24–44)
MAGNESIUM: 2.1 MG/DL (ref 1.6–2.6)
MCH RBC QN AUTO: 29.8 PG (ref 26–34)
MCHC RBC AUTO-ENTMCNC: 33.1 G/DL (ref 31–37)
MCV RBC AUTO: 89.9 FL (ref 80–100)
MONOCYTES # BLD: 9 % (ref 1–7)
MORPHOLOGY: ABNORMAL
NRBC AUTOMATED: ABNORMAL PER 100 WBC
PDW BLD-RTO: 15.2 % (ref 11.5–14.9)
PHOSPHORUS: 3.2 MG/DL (ref 2.6–4.5)
PLATELET # BLD: 227 K/UL (ref 150–450)
PLATELET ESTIMATE: ABNORMAL
PMV BLD AUTO: 8.3 FL (ref 6–12)
POTASSIUM SERPL-SCNC: 4.4 MMOL/L (ref 3.7–5.3)
RBC # BLD: 3.57 M/UL (ref 4–5.2)
RBC # BLD: ABNORMAL 10*6/UL
SEG NEUTROPHILS: 41 % (ref 36–66)
SEGMENTED NEUTROPHILS ABSOLUTE COUNT: 2.21 K/UL (ref 1.3–9.1)
SODIUM BLD-SCNC: 140 MMOL/L (ref 135–144)
WBC # BLD: 5.4 K/UL (ref 3.5–11)
WBC # BLD: ABNORMAL 10*3/UL

## 2019-08-20 PROCEDURE — 84520 ASSAY OF UREA NITROGEN: CPT

## 2019-08-20 PROCEDURE — 80051 ELECTROLYTE PANEL: CPT

## 2019-08-20 PROCEDURE — 84100 ASSAY OF PHOSPHORUS: CPT

## 2019-08-20 PROCEDURE — 36415 COLL VENOUS BLD VENIPUNCTURE: CPT

## 2019-08-20 PROCEDURE — 83735 ASSAY OF MAGNESIUM: CPT

## 2019-08-20 PROCEDURE — 85025 COMPLETE CBC W/AUTO DIFF WBC: CPT

## 2019-08-20 PROCEDURE — 82565 ASSAY OF CREATININE: CPT

## 2019-08-20 PROCEDURE — 82310 ASSAY OF CALCIUM: CPT

## 2019-08-30 ENCOUNTER — HOSPITAL ENCOUNTER (OUTPATIENT)
Age: 74
Discharge: HOME | End: 2019-08-30
Payer: MEDICARE

## 2019-08-30 DIAGNOSIS — N18.30 CKD (CHRONIC KIDNEY DISEASE), STAGE III (HCC): ICD-10-CM

## 2019-08-30 LAB
CREAT SERPL-MCNC: 1.37 MG/DL (ref 0.5–0.9)
CREATININE CLEARANCE: 46.9 ML/MIN/BSA (ref 71–151)
CREATININE URINE: 60.8 MG/DL (ref 28–217)
HOURS COLLECTED: 24 H
HOURS COLLECTED: 24 H
LENGTH OF COLLECTION: 24 H
PATIENT HEIGHT: 152 CM
PATIENT WEIGHT: 52.62 KG
PROTEIN 24 HOUR URINE: 195 MG/24 H
PROTEIN,TOT TIMED UR: ABNORMAL MG/X H
SODIUM 24 HOUR URINE: 95 MMOL/24 H (ref 40–220)
SODIUM TIMED UR: NORMAL MMOL/X H
SODIUM URINE: 73 MMOL/L
URINE TOTAL PROTEIN: 15 MG/DL
VOLUME: 1300 ML

## 2019-08-30 PROCEDURE — 82575 CREATININE CLEARANCE TEST: CPT

## 2019-08-30 PROCEDURE — 84300 ASSAY OF URINE SODIUM: CPT

## 2019-08-30 PROCEDURE — 84156 ASSAY OF PROTEIN URINE: CPT

## 2019-09-17 ENCOUNTER — HOSPITAL ENCOUNTER (OUTPATIENT)
Dept: PAIN MANAGEMENT | Age: 74
Discharge: HOME | End: 2019-09-17
Payer: MEDICARE

## 2019-09-17 VITALS
DIASTOLIC BLOOD PRESSURE: 60 MMHG | RESPIRATION RATE: 16 BRPM | BODY MASS INDEX: 22.78 KG/M2 | HEART RATE: 70 BPM | SYSTOLIC BLOOD PRESSURE: 108 MMHG | HEIGHT: 60 IN | OXYGEN SATURATION: 97 % | WEIGHT: 116 LBS | TEMPERATURE: 98.8 F

## 2019-09-17 DIAGNOSIS — M48.061 SPINAL STENOSIS, LUMBAR REGION, WITHOUT NEUROGENIC CLAUDICATION: Chronic | ICD-10-CM

## 2019-09-17 DIAGNOSIS — M51.37 DEGENERATION OF LUMBAR OR LUMBOSACRAL INTERVERTEBRAL DISC: Primary | Chronic | ICD-10-CM

## 2019-09-17 DIAGNOSIS — Z51.81 MEDICATION MONITORING ENCOUNTER: ICD-10-CM

## 2019-09-17 DIAGNOSIS — M54.16 LUMBAR RADICULOPATHY, CHRONIC: ICD-10-CM

## 2019-09-17 DIAGNOSIS — M47.817 LUMBOSACRAL SPONDYLOSIS WITHOUT MYELOPATHY: Chronic | ICD-10-CM

## 2019-09-17 PROCEDURE — 99213 OFFICE O/P EST LOW 20 MIN: CPT | Performed by: NURSE PRACTITIONER

## 2019-09-17 PROCEDURE — 99213 OFFICE O/P EST LOW 20 MIN: CPT

## 2019-09-17 RX ORDER — HYDROCODONE BITARTRATE AND ACETAMINOPHEN 5; 325 MG/1; MG/1
1 TABLET ORAL 2 TIMES DAILY PRN
Qty: 60 TABLET | Refills: 0 | Status: SHIPPED | OUTPATIENT
Start: 2019-09-20 | End: 2019-10-15 | Stop reason: SDUPTHER

## 2019-09-17 ASSESSMENT — ENCOUNTER SYMPTOMS
BACK PAIN: 1
GASTROINTESTINAL NEGATIVE: 1

## 2019-09-17 NOTE — PROGRESS NOTES
EER Hi Res Interp Ur See Note    Final 07/17/2019 11:10 AM [facility]   (NOTE)   Access [facility] Enhanced Report using either link below:   -Direct access: [email or web address]   -Enter Username, Password: [email or web address]   Username: [email or web address]   Password: [email or web address]   Performed by [facility] [phone]   www. Jovani Agee MD, Lab.            Past Medical History:   Diagnosis Date    Anxiety     Cancer [facility])     breast cancer, left     CKD (chronic kidney disease) stage 3, GFR 30-59 ml/min ([facility])        Past Surgical History:   Procedure Laterality Date    APPENDECTOMY      BREAST LUMPECTOMY      right fatty tissue removed    BREAST REDUCTION SURGERY Right     BREAST SURGERY Left     left mastectomy w/reconstruction, tram    CATARACT REMOVAL Bilateral 03/03/2016    CHOLECYSTECTOMY      COLONOSCOPY      COLONOSCOPY  10/13/2016    Dr Darya Waller         No Known Allergies      Current Outpatient Medications:     [START ON 9/20/2019] HYDROcodone-acetaminophen (NORCO) 5-325 MG per tablet, Take 1 tablet by mouth 2 times daily as needed for Pain for up to 30 days. , Disp: 60 tablet, Rfl: 0    citalopram (CELEXA) 40 MG tablet, TAKE 1 TABLET BY MOUTH EVERY NIGHT AT BEDTIME, Disp: 90 tablet, Rfl: 0    buPROPion (WELLBUTRIN XL) 300 MG extended release tablet, TAKE 1 TABLET BY MOUTH EVERY MORNING, Disp: 90 tablet, Rfl: 0    Multiple Vitamins-Minerals (MULTIVITAMIN ADULT PO), Take by mouth, Disp: , Rfl:     Calcium Carb-Cholecalciferol (CALCIUM + D3) 600-200 MG-UNIT TABS tablet, Take 1 tablet by mouth Daily with lunch, Disp: , Rfl:     naproxen sodium (ALEVE) 220 MG tablet, Take 220 mg by mouth 2 times daily as needed for Pain, Disp: , Rfl:     Family History   Problem Relation Age of Onset    Heart Disease Father        Social History     Socioeconomic History    Marital status:      Spouse name: Not on file    Number of children: 2    Years of education: 15    Highest education level: Not on file   Occupational History    Occupation: retired   Social Needs    Financial resource strain: Not on file    Food insecurity:     Worry: Not on file     Inability: Not on file   Dmailer needs:     Medical: Not on file     Non-medical: Not on file   Tobacco Use    Smoking status: Current Every Day Smoker     Packs/day: 1.00     Years: 32.00     Pack years: 32.00    Smokeless tobacco: Never Used   Substance and Sexual Activity    Alcohol use: Yes     Comment: occ. wine or rum.  Drug use: No    Sexual activity: Not on file   Lifestyle    Physical activity:     Days per week: Not on file     Minutes per session: Not on file    Stress: Not on file   Relationships    Social connections:     Talks on phone: Not on file     Gets together: Not on file     Attends Scientologist service: Not on file     Active member of club or organization: Not on file     Attends meetings of clubs or organizations: Not on file     Relationship status: Not on file    Intimate partner violence:     Fear of current or ex partner: Not on file     Emotionally abused: Not on file     Physically abused: Not on file     Forced sexual activity: Not on file   Other Topics Concern    Not on file   Social History Narrative    Not on file       Review of Systems:  Review of Systems   Constitution: Positive for malaise/fatigue. HENT: Negative. Cardiovascular: Negative. Respiratory:        Smokes   Skin: Negative. Musculoskeletal: Positive for back pain and joint pain. Gastrointestinal: Negative. Genitourinary: Negative. Neurological: Negative. Psychiatric/Behavioral: Positive for depression.         Managing         Physical Exam:  /60   Pulse 70   Temp 98.8 °F (37.1 °C) (Oral)   Resp 16   Ht 5' (1.524 m)   Wt 116 lb (52.6 kg)   SpO2 97%   BMI 22.65 kg/m²     Physical Exam   Constitutional: She appears

## 2019-09-18 ENCOUNTER — HOSPITAL ENCOUNTER (OUTPATIENT)
Dept: ULTRASOUND IMAGING | Age: 74
Discharge: HOME | End: 2019-09-20
Payer: MEDICARE

## 2019-09-18 DIAGNOSIS — N18.30 CKD (CHRONIC KIDNEY DISEASE), STAGE III (HCC): ICD-10-CM

## 2019-09-18 PROCEDURE — 76775 US EXAM ABDO BACK WALL LIM: CPT

## 2019-10-10 ENCOUNTER — OFFICE VISIT (OUTPATIENT)
Dept: PRIMARY CARE CLINIC | Age: 74
End: 2019-10-10
Payer: MEDICARE

## 2019-10-10 VITALS
OXYGEN SATURATION: 98 % | WEIGHT: 118.4 LBS | DIASTOLIC BLOOD PRESSURE: 72 MMHG | HEART RATE: 82 BPM | BODY MASS INDEX: 23.12 KG/M2 | SYSTOLIC BLOOD PRESSURE: 130 MMHG

## 2019-10-10 DIAGNOSIS — Z79.899 MEDICATION MANAGEMENT: ICD-10-CM

## 2019-10-10 DIAGNOSIS — F32.A DEPRESSIVE DISORDER: ICD-10-CM

## 2019-10-10 DIAGNOSIS — M51.37 DEGENERATION OF LUMBAR OR LUMBOSACRAL INTERVERTEBRAL DISC: Chronic | ICD-10-CM

## 2019-10-10 DIAGNOSIS — D64.9 CHRONIC ANEMIA: ICD-10-CM

## 2019-10-10 DIAGNOSIS — Z12.31 ENCOUNTER FOR SCREENING MAMMOGRAM FOR MALIGNANT NEOPLASM OF BREAST: ICD-10-CM

## 2019-10-10 DIAGNOSIS — Z12.39 BREAST CANCER SCREENING: ICD-10-CM

## 2019-10-10 DIAGNOSIS — N18.30 CKD (CHRONIC KIDNEY DISEASE) STAGE 3, GFR 30-59 ML/MIN (HCC): Primary | ICD-10-CM

## 2019-10-10 DIAGNOSIS — Z23 NEEDS FLU SHOT: ICD-10-CM

## 2019-10-10 PROCEDURE — 99214 OFFICE O/P EST MOD 30 MIN: CPT | Performed by: FAMILY MEDICINE

## 2019-10-10 PROCEDURE — 90653 IIV ADJUVANT VACCINE IM: CPT | Performed by: FAMILY MEDICINE

## 2019-10-10 PROCEDURE — G0008 ADMIN INFLUENZA VIRUS VAC: HCPCS | Performed by: FAMILY MEDICINE

## 2019-10-10 PROCEDURE — 4004F PT TOBACCO SCREEN RCVD TLK: CPT | Performed by: FAMILY MEDICINE

## 2019-10-10 PROCEDURE — 1123F ACP DISCUSS/DSCN MKR DOCD: CPT | Performed by: FAMILY MEDICINE

## 2019-10-10 PROCEDURE — 1090F PRES/ABSN URINE INCON ASSESS: CPT | Performed by: FAMILY MEDICINE

## 2019-10-10 PROCEDURE — G8427 DOCREV CUR MEDS BY ELIG CLIN: HCPCS | Performed by: FAMILY MEDICINE

## 2019-10-10 PROCEDURE — G8399 PT W/DXA RESULTS DOCUMENT: HCPCS | Performed by: FAMILY MEDICINE

## 2019-10-10 PROCEDURE — G8420 CALC BMI NORM PARAMETERS: HCPCS | Performed by: FAMILY MEDICINE

## 2019-10-10 PROCEDURE — G8482 FLU IMMUNIZE ORDER/ADMIN: HCPCS | Performed by: FAMILY MEDICINE

## 2019-10-10 PROCEDURE — 3017F COLORECTAL CA SCREEN DOC REV: CPT | Performed by: FAMILY MEDICINE

## 2019-10-10 PROCEDURE — 4040F PNEUMOC VAC/ADMIN/RCVD: CPT | Performed by: FAMILY MEDICINE

## 2019-10-10 ASSESSMENT — ENCOUNTER SYMPTOMS
BACK PAIN: 1
RESPIRATORY NEGATIVE: 1

## 2019-10-15 ENCOUNTER — HOSPITAL ENCOUNTER (OUTPATIENT)
Dept: GENERAL RADIOLOGY | Age: 74
Discharge: HOME | End: 2019-10-17
Payer: MEDICARE

## 2019-10-15 ENCOUNTER — HOSPITAL ENCOUNTER (OUTPATIENT)
Age: 74
Discharge: HOME | End: 2019-10-15
Payer: MEDICARE

## 2019-10-15 ENCOUNTER — HOSPITAL ENCOUNTER (OUTPATIENT)
Dept: PAIN MANAGEMENT | Age: 74
Discharge: HOME | End: 2019-10-15
Payer: MEDICARE

## 2019-10-15 ENCOUNTER — HOSPITAL ENCOUNTER (OUTPATIENT)
Age: 74
Discharge: HOME | End: 2019-10-17
Payer: MEDICARE

## 2019-10-15 VITALS
BODY MASS INDEX: 23.16 KG/M2 | HEIGHT: 60 IN | SYSTOLIC BLOOD PRESSURE: 126 MMHG | RESPIRATION RATE: 16 BRPM | HEART RATE: 77 BPM | DIASTOLIC BLOOD PRESSURE: 59 MMHG | WEIGHT: 118 LBS | TEMPERATURE: 98.2 F | OXYGEN SATURATION: 96 %

## 2019-10-15 DIAGNOSIS — Z79.899 MEDICATION MANAGEMENT: ICD-10-CM

## 2019-10-15 DIAGNOSIS — M51.37 DEGENERATION OF LUMBAR OR LUMBOSACRAL INTERVERTEBRAL DISC: Primary | Chronic | ICD-10-CM

## 2019-10-15 DIAGNOSIS — D64.9 CHRONIC ANEMIA: ICD-10-CM

## 2019-10-15 DIAGNOSIS — M48.061 SPINAL STENOSIS, LUMBAR REGION, WITHOUT NEUROGENIC CLAUDICATION: Chronic | ICD-10-CM

## 2019-10-15 DIAGNOSIS — M54.16 LUMBAR RADICULOPATHY, CHRONIC: ICD-10-CM

## 2019-10-15 DIAGNOSIS — M51.37 DEGENERATION OF LUMBAR OR LUMBOSACRAL INTERVERTEBRAL DISC: Chronic | ICD-10-CM

## 2019-10-15 DIAGNOSIS — M47.817 LUMBOSACRAL SPONDYLOSIS WITHOUT MYELOPATHY: Chronic | ICD-10-CM

## 2019-10-15 DIAGNOSIS — N18.30 CKD (CHRONIC KIDNEY DISEASE) STAGE 3, GFR 30-59 ML/MIN (HCC): ICD-10-CM

## 2019-10-15 PROCEDURE — 99213 OFFICE O/P EST LOW 20 MIN: CPT

## 2019-10-15 PROCEDURE — 99213 OFFICE O/P EST LOW 20 MIN: CPT | Performed by: NURSE PRACTITIONER

## 2019-10-15 PROCEDURE — 36415 COLL VENOUS BLD VENIPUNCTURE: CPT

## 2019-10-15 PROCEDURE — 83550 IRON BINDING TEST: CPT

## 2019-10-15 PROCEDURE — 83540 ASSAY OF IRON: CPT

## 2019-10-15 PROCEDURE — 72110 X-RAY EXAM L-2 SPINE 4/>VWS: CPT

## 2019-10-15 PROCEDURE — 82607 VITAMIN B-12: CPT

## 2019-10-15 PROCEDURE — 82728 ASSAY OF FERRITIN: CPT

## 2019-10-15 RX ORDER — HYDROCODONE BITARTRATE AND ACETAMINOPHEN 5; 325 MG/1; MG/1
1 TABLET ORAL 2 TIMES DAILY PRN
Qty: 60 TABLET | Refills: 0 | Status: SHIPPED | OUTPATIENT
Start: 2019-10-20 | End: 2020-05-21 | Stop reason: SDUPTHER

## 2019-10-15 ASSESSMENT — ENCOUNTER SYMPTOMS
BACK PAIN: 1
EYES NEGATIVE: 1
GASTROINTESTINAL NEGATIVE: 1

## 2019-10-16 LAB
FERRITIN: 14 UG/L (ref 13–150)
IRON SATURATION: 22 % (ref 20–55)
IRON: 83 UG/DL (ref 37–145)
TOTAL IRON BINDING CAPACITY: 370 UG/DL (ref 250–450)
UNSATURATED IRON BINDING CAPACITY: 287 UG/DL (ref 112–347)
VITAMIN B-12: 497 PG/ML (ref 232–1245)

## 2019-10-17 DIAGNOSIS — M81.0 MENOPAUSAL OSTEOPOROSIS: ICD-10-CM

## 2019-10-17 DIAGNOSIS — Z51.81 MEDICATION MONITORING ENCOUNTER: ICD-10-CM

## 2019-10-17 DIAGNOSIS — G47.00 INSOMNIA, UNSPECIFIED TYPE: ICD-10-CM

## 2019-10-17 DIAGNOSIS — F32.A DEPRESSIVE DISORDER: ICD-10-CM

## 2019-10-17 RX ORDER — CITALOPRAM 40 MG/1
40 TABLET ORAL DAILY
Qty: 90 TABLET | Refills: 1 | Status: SHIPPED | OUTPATIENT
Start: 2019-10-17 | End: 2020-04-22 | Stop reason: SDUPTHER

## 2019-10-17 RX ORDER — BUPROPION HYDROCHLORIDE 300 MG/1
300 TABLET ORAL EVERY MORNING
Qty: 90 TABLET | Refills: 0 | Status: SHIPPED | OUTPATIENT
Start: 2019-10-17 | End: 2020-02-26 | Stop reason: SDUPTHER

## 2019-10-17 RX ORDER — CALCITONIN SALMON 200 [IU]/.09ML
1 SPRAY, METERED NASAL DAILY
Qty: 11.1 ML | Refills: 1 | Status: SHIPPED | OUTPATIENT
Start: 2019-10-17 | End: 2019-10-23 | Stop reason: SDUPTHER

## 2019-10-18 ENCOUNTER — TELEPHONE (OUTPATIENT)
Dept: PRIMARY CARE CLINIC | Age: 74
End: 2019-10-18

## 2019-10-21 ENCOUNTER — HOSPITAL ENCOUNTER (OUTPATIENT)
Dept: WOMENS IMAGING | Age: 74
Discharge: HOME | End: 2019-10-23
Payer: MEDICARE

## 2019-10-21 DIAGNOSIS — Z12.39 BREAST CANCER SCREENING: ICD-10-CM

## 2019-10-21 DIAGNOSIS — Z12.31 ENCOUNTER FOR SCREENING MAMMOGRAM FOR MALIGNANT NEOPLASM OF BREAST: ICD-10-CM

## 2019-10-21 PROCEDURE — 77063 BREAST TOMOSYNTHESIS BI: CPT

## 2019-10-23 DIAGNOSIS — M81.0 MENOPAUSAL OSTEOPOROSIS: ICD-10-CM

## 2019-10-23 RX ORDER — CALCITONIN SALMON 200 [IU]/.09ML
SPRAY, METERED NASAL
Qty: 11.1 ML | Refills: 1 | Status: SHIPPED | OUTPATIENT
Start: 2019-10-23 | End: 2020-04-22 | Stop reason: SDUPTHER

## 2019-12-19 ENCOUNTER — APPOINTMENT (RX ONLY)
Dept: URBAN - METROPOLITAN AREA CLINIC 148 | Facility: CLINIC | Age: 74
Setting detail: DERMATOLOGY
End: 2019-12-19

## 2019-12-19 DIAGNOSIS — B07.8 OTHER VIRAL WARTS: ICD-10-CM

## 2019-12-19 DIAGNOSIS — L82.1 OTHER SEBORRHEIC KERATOSIS: ICD-10-CM

## 2019-12-19 DIAGNOSIS — D22 MELANOCYTIC NEVI: ICD-10-CM

## 2019-12-19 DIAGNOSIS — D18.0 HEMANGIOMA: ICD-10-CM

## 2019-12-19 DIAGNOSIS — L57.0 ACTINIC KERATOSIS: ICD-10-CM

## 2019-12-19 DIAGNOSIS — L81.4 OTHER MELANIN HYPERPIGMENTATION: ICD-10-CM

## 2019-12-19 PROBLEM — D22.5 MELANOCYTIC NEVI OF TRUNK: Status: ACTIVE | Noted: 2019-12-19

## 2019-12-19 PROBLEM — D18.01 HEMANGIOMA OF SKIN AND SUBCUTANEOUS TISSUE: Status: ACTIVE | Noted: 2019-12-19

## 2019-12-19 PROCEDURE — 17110 DESTRUCTION B9 LES UP TO 14: CPT

## 2019-12-19 PROCEDURE — ? LIQUID NITROGEN

## 2019-12-19 PROCEDURE — 17000 DESTRUCT PREMALG LESION: CPT | Mod: 59

## 2019-12-19 PROCEDURE — ? FULL BODY SKIN EXAM

## 2019-12-19 PROCEDURE — ? COUNSELING

## 2019-12-19 PROCEDURE — 99214 OFFICE O/P EST MOD 30 MIN: CPT | Mod: 25

## 2019-12-19 ASSESSMENT — LOCATION ZONE DERM
LOCATION ZONE: ARM
LOCATION ZONE: LIP
LOCATION ZONE: HAND
LOCATION ZONE: TRUNK
LOCATION ZONE: FINGER
LOCATION ZONE: FACE
LOCATION ZONE: LEG

## 2019-12-19 ASSESSMENT — LOCATION SIMPLE DESCRIPTION DERM
LOCATION SIMPLE: RIGHT UPPER BACK
LOCATION SIMPLE: RIGHT HAND
LOCATION SIMPLE: RIGHT THIGH
LOCATION SIMPLE: RIGHT LIP
LOCATION SIMPLE: RIGHT LOWER BACK
LOCATION SIMPLE: CHEST
LOCATION SIMPLE: LEFT POSTERIOR UPPER ARM
LOCATION SIMPLE: LEFT THIGH
LOCATION SIMPLE: RIGHT POSTERIOR UPPER ARM
LOCATION SIMPLE: RIGHT THUMB
LOCATION SIMPLE: LEFT CHEEK

## 2019-12-19 ASSESSMENT — LOCATION DETAILED DESCRIPTION DERM
LOCATION DETAILED: LEFT ANTERIOR DISTAL THIGH
LOCATION DETAILED: RIGHT SUPERIOR MEDIAL UPPER BACK
LOCATION DETAILED: RIGHT UPPER CUTANEOUS LIP
LOCATION DETAILED: LEFT MEDIAL SUPERIOR CHEST
LOCATION DETAILED: DORSAL INTERPHALANGEAL JOINT RIGHT THUMB
LOCATION DETAILED: LEFT INFERIOR CENTRAL MALAR CHEEK
LOCATION DETAILED: RIGHT INFERIOR MEDIAL MIDBACK
LOCATION DETAILED: RIGHT SUPERIOR MEDIAL MIDBACK
LOCATION DETAILED: RIGHT HYPOTHENAR EMINENCE
LOCATION DETAILED: RIGHT ANTERIOR DISTAL THIGH
LOCATION DETAILED: RIGHT DISTAL POSTERIOR UPPER ARM
LOCATION DETAILED: LEFT PROXIMAL POSTERIOR UPPER ARM

## 2019-12-19 NOTE — PROCEDURE: LIQUID NITROGEN
Consent: The patient's consent was obtained including but not limited to risks of crusting, scabbing, blistering, scarring, darker or lighter pigmentary change, recurrence, incomplete removal and infection.
Render Note In Bullet Format When Appropriate: No
Number Of Freeze-Thaw Cycles: 2 freeze-thaw cycles
Duration Of Freeze Thaw-Cycle (Seconds): 2
Post-Care Instructions: I reviewed with the patient in detail post-care instructions. Patient is to wear sunprotection, and avoid picking at any of the treated lesions. Pt may apply Vaseline to crusted or scabbing areas.
Detail Level: Detailed
Detail Level: Zone
Duration Of Freeze Thaw-Cycle (Seconds): 5-15
Medical Necessity Information: It is in your best interest to select a reason for this procedure from the list below. All of these items fulfill various CMS LCD requirements except the new and changing color options.
Medical Necessity Clause: This procedure was medically necessary because the lesions that were treated were:

## 2020-02-06 ENCOUNTER — APPOINTMENT (RX ONLY)
Dept: URBAN - METROPOLITAN AREA CLINIC 148 | Facility: CLINIC | Age: 75
Setting detail: DERMATOLOGY
End: 2020-02-06

## 2020-02-06 DIAGNOSIS — B07.8 OTHER VIRAL WARTS: ICD-10-CM

## 2020-02-06 DIAGNOSIS — L57.0 ACTINIC KERATOSIS: ICD-10-CM | Status: RESOLVED

## 2020-02-06 PROBLEM — D48.5 NEOPLASM OF UNCERTAIN BEHAVIOR OF SKIN: Status: ACTIVE | Noted: 2020-02-06

## 2020-02-06 PROCEDURE — 11102 TANGNTL BX SKIN SINGLE LES: CPT

## 2020-02-06 PROCEDURE — ? COUNSELING

## 2020-02-06 PROCEDURE — ? BIOPSY BY SHAVE METHOD

## 2020-02-06 PROCEDURE — 99213 OFFICE O/P EST LOW 20 MIN: CPT | Mod: 25

## 2020-02-06 ASSESSMENT — LOCATION SIMPLE DESCRIPTION DERM: LOCATION SIMPLE: RIGHT THUMB

## 2020-02-06 ASSESSMENT — LOCATION DETAILED DESCRIPTION DERM: LOCATION DETAILED: RIGHT PROXIMAL RADIAL THUMB

## 2020-02-06 ASSESSMENT — LOCATION ZONE DERM: LOCATION ZONE: FINGER

## 2020-02-06 NOTE — PROCEDURE: BIOPSY BY SHAVE METHOD
Detail Level: Detailed
Depth Of Biopsy: dermis
Was A Bandage Applied: Yes
Size Of Lesion In Cm: 0
Biopsy Type: H and E
Biopsy Method: double edge Personna blade
Anesthesia Type: 1% lidocaine with epinephrine
Anesthesia Volume In Cc (Will Not Render If 0): 0.5
Hemostasis: Aluminum Chloride and Electrocautery
Wound Care: Vaseline
Dressing: bandage
Destruction After The Procedure: No
Type Of Destruction Used: Curettage
Curettage Text: The wound bed was treated with curettage after the biopsy was performed.
Cryotherapy Text: The wound bed was treated with cryotherapy after the biopsy was performed.
Electrodesiccation Text: The wound bed was treated with electrodesiccation after the biopsy was performed.
Electrodesiccation And Curettage Text: The wound bed was treated with electrodesiccation and curettage after the biopsy was performed.
Silver Nitrate Text: The wound bed was treated with silver nitrate after the biopsy was performed.
Lab: 6
Lab Facility: 3
Consent: Written consent was obtained and risks were reviewed including but not limited to scarring, infection, bleeding, scabbing, incomplete removal, nerve damage and allergy to anesthesia.
Post-Care Instructions: I reviewed with the patient in detail post-care instructions. Patient is to keep the biopsy site dry overnight, and then apply bacitracin or vaseline twice daily until healed. Patient may apply hydrogen peroxide soaks to remove any crusting.
Notification Instructions: Patient will be notified of biopsy results if a further procedure is needed. However, patient may call the office if not contacted within 2 weeks to find out results.
Billing Type: Third-Party Bill

## 2020-02-26 RX ORDER — BUPROPION HYDROCHLORIDE 300 MG/1
300 TABLET ORAL EVERY MORNING
Qty: 90 TABLET | Refills: 1 | Status: SHIPPED | OUTPATIENT
Start: 2020-02-26 | End: 2020-09-08

## 2020-03-11 ENCOUNTER — APPOINTMENT (RX ONLY)
Dept: URBAN - METROPOLITAN AREA CLINIC 148 | Facility: CLINIC | Age: 75
Setting detail: DERMATOLOGY
End: 2020-03-11

## 2020-03-11 DIAGNOSIS — L82.1 OTHER SEBORRHEIC KERATOSIS: ICD-10-CM

## 2020-03-11 PROCEDURE — 99212 OFFICE O/P EST SF 10 MIN: CPT

## 2020-03-11 PROCEDURE — ? COUNSELING

## 2020-03-11 PROCEDURE — ? PATHOLOGY DISCUSSION

## 2020-03-11 ASSESSMENT — LOCATION SIMPLE DESCRIPTION DERM: LOCATION SIMPLE: RIGHT THUMB

## 2020-03-11 ASSESSMENT — LOCATION DETAILED DESCRIPTION DERM: LOCATION DETAILED: RIGHT PROXIMAL RADIAL THUMB

## 2020-03-11 ASSESSMENT — LOCATION ZONE DERM: LOCATION ZONE: FINGER

## 2020-04-22 ENCOUNTER — OFFICE VISIT (OUTPATIENT)
Dept: PRIMARY CARE CLINIC | Age: 75
End: 2020-04-22
Payer: MEDICARE

## 2020-04-22 VITALS
WEIGHT: 118.2 LBS | SYSTOLIC BLOOD PRESSURE: 144 MMHG | OXYGEN SATURATION: 98 % | HEART RATE: 72 BPM | TEMPERATURE: 98.2 F | BODY MASS INDEX: 23.08 KG/M2 | DIASTOLIC BLOOD PRESSURE: 76 MMHG

## 2020-04-22 PROBLEM — F33.41 RECURRENT MAJOR DEPRESSIVE DISORDER, IN PARTIAL REMISSION (HCC): Status: ACTIVE | Noted: 2020-04-22

## 2020-04-22 PROCEDURE — 4004F PT TOBACCO SCREEN RCVD TLK: CPT | Performed by: FAMILY MEDICINE

## 2020-04-22 PROCEDURE — 3017F COLORECTAL CA SCREEN DOC REV: CPT | Performed by: FAMILY MEDICINE

## 2020-04-22 PROCEDURE — 1123F ACP DISCUSS/DSCN MKR DOCD: CPT | Performed by: FAMILY MEDICINE

## 2020-04-22 PROCEDURE — G8399 PT W/DXA RESULTS DOCUMENT: HCPCS | Performed by: FAMILY MEDICINE

## 2020-04-22 PROCEDURE — G8420 CALC BMI NORM PARAMETERS: HCPCS | Performed by: FAMILY MEDICINE

## 2020-04-22 PROCEDURE — 99213 OFFICE O/P EST LOW 20 MIN: CPT | Performed by: FAMILY MEDICINE

## 2020-04-22 PROCEDURE — 1090F PRES/ABSN URINE INCON ASSESS: CPT | Performed by: FAMILY MEDICINE

## 2020-04-22 PROCEDURE — G8427 DOCREV CUR MEDS BY ELIG CLIN: HCPCS | Performed by: FAMILY MEDICINE

## 2020-04-22 PROCEDURE — 4040F PNEUMOC VAC/ADMIN/RCVD: CPT | Performed by: FAMILY MEDICINE

## 2020-04-22 RX ORDER — CALCITONIN SALMON 200 [IU]/.09ML
1 SPRAY, METERED NASAL DAILY
Qty: 11.1 ML | Refills: 10 | Status: SHIPPED | OUTPATIENT
Start: 2020-04-22 | End: 2021-04-22 | Stop reason: SDUPTHER

## 2020-04-22 RX ORDER — CITALOPRAM 40 MG/1
40 TABLET ORAL DAILY
Qty: 90 TABLET | Refills: 2 | Status: SHIPPED | OUTPATIENT
Start: 2020-04-22 | End: 2020-09-22 | Stop reason: SDUPTHER

## 2020-04-22 ASSESSMENT — ENCOUNTER SYMPTOMS
RESPIRATORY NEGATIVE: 1
BACK PAIN: 1

## 2020-04-22 NOTE — PATIENT INSTRUCTIONS
Patient Education         Antidepressants: Keeping Your Life in Balance (01:52)  Your health professional recommends that you watch this short online health video. Discover how your antidepressant can make life better and how to make it easier to take. How to watch the video    Scan the QR code   OR Visit the website    [email or web address]   Current as of: May 28, 2019Content Version: 12.4  © 9906-5614 [employer]. Care instructions adapted under license by [employer] ([facility]). If you have questions about a medical condition or this instruction, always ask your healthcare professional. Eek [employer]rell any warranty or liability for your use of this information. Patient Education         Depression: Mood Check-In to Prevent a Relapse (03:26)  Your health professional recommends that you watch this short online health video. Learn to check your mood for warning signs that depression may be coming back. How to watch the video    Scan the QR code   OR Visit the website    [email or web address]   Current as of: May 28, 2019Content Version: 12.4  © 6446-9423 [employer]. Care instructions adapted under license by [employer] ([facility]). If you have questions about a medical condition or this instruction, always ask your healthcare professional. [employer] any warranty or liability for your use of this information.

## 2020-04-27 ENCOUNTER — HOSPITAL ENCOUNTER (OUTPATIENT)
Age: 75
Discharge: HOME | End: 2020-04-27
Payer: MEDICARE

## 2020-04-27 LAB
ANION GAP SERPL CALCULATED.3IONS-SCNC: 10 MMOL/L (ref 9–17)
BUN BLDV-MCNC: 24 MG/DL (ref 8–23)
BUN/CREAT BLD: ABNORMAL (ref 9–20)
CALCIUM SERPL-MCNC: 9.4 MG/DL (ref 8.6–10.4)
CHLORIDE BLD-SCNC: 106 MMOL/L (ref 98–107)
CO2: 24 MMOL/L (ref 20–31)
CREAT SERPL-MCNC: 1.33 MG/DL (ref 0.5–0.9)
GFR AFRICAN AMERICAN: 47 ML/MIN
GFR NON-AFRICAN AMERICAN: 39 ML/MIN
GFR SERPL CREATININE-BSD FRML MDRD: ABNORMAL ML/MIN/{1.73_M2}
GFR SERPL CREATININE-BSD FRML MDRD: ABNORMAL ML/MIN/{1.73_M2}
GLUCOSE BLD-MCNC: 98 MG/DL (ref 70–99)
POTASSIUM SERPL-SCNC: 4.8 MMOL/L (ref 3.7–5.3)
SODIUM BLD-SCNC: 140 MMOL/L (ref 135–144)

## 2020-04-27 PROCEDURE — 36415 COLL VENOUS BLD VENIPUNCTURE: CPT

## 2020-04-27 PROCEDURE — 80048 BASIC METABOLIC PNL TOTAL CA: CPT

## 2020-05-21 ENCOUNTER — HOSPITAL ENCOUNTER (OUTPATIENT)
Dept: PAIN MANAGEMENT | Age: 75
Discharge: HOME | End: 2020-05-21
Payer: MEDICARE

## 2020-05-21 PROCEDURE — 99213 OFFICE O/P EST LOW 20 MIN: CPT | Performed by: NURSE PRACTITIONER

## 2020-05-21 PROCEDURE — 99213 OFFICE O/P EST LOW 20 MIN: CPT

## 2020-05-21 RX ORDER — HYDROCODONE BITARTRATE AND ACETAMINOPHEN 5; 325 MG/1; MG/1
1 TABLET ORAL 2 TIMES DAILY PRN
Qty: 60 TABLET | Refills: 0 | Status: SHIPPED | OUTPATIENT
Start: 2020-05-21 | End: 2020-06-17 | Stop reason: SDUPTHER

## 2020-05-21 ASSESSMENT — ENCOUNTER SYMPTOMS
GASTROINTESTINAL NEGATIVE: 1
EYES NEGATIVE: 1
BACK PAIN: 1

## 2020-05-21 NOTE — PROGRESS NOTES
effects and reports adequate analgesia. No sign of misuse/abuse. When was thelast UDS:   7-          Was the UDS appropriate:yes      Record/Diagnostics Review:      As above, I did review the imaging    7/20/2019 11:52 PM - Naseem, Bulmaro Incoming Lab Results From [facility]     Component Value Ref Range & Units Status Collected Lab   Pain Management Drug Panel Interp, Urine Consistent   Final 07/17/2019 11:10 AM [facility]   (NOTE)   ________________________________________________________________   DRUGS EXPECTED:   NORCO (HYDROCODONE) [7/15/19]   ________________________________________________________________   CONSISTENT with medications provided:   [address] (HYDROCODONE) : based on the absence of hydrocodone and   metabolites   ________________________________________________________________   Drugs Not Included in this Assay:   Acetaminophen   ________________________________________________________________   INTERPRETIVE INFORMATION: Pain Mgt [place], [place] Spec/EMIT, Ur,                            Interp   Interpretation depends on accuracy and completeness of patient   medication information submitted by client. 6-Acetylmorphine, Ur Not Detected   Final 07/17/2019 11:10 AM [facility]   7-Aminoclonazepam, Urine Not Detected   Final 07/17/2019 11:10 AM [facility]   Alpha-OH-Alpraz, Urine Not Detected   Final 07/17/2019 11:10 AM [facility]   Alprazolam, Urine Not Detected   Final 07/17/2019 11:10 AM [facility]   Amphetamines, urine Not Detected   Final 07/17/2019 11:10 AM [facility]   Barbiturates, Ur Not Detected   Final 07/17/2019 11:10 AM [facility]   Benzoylecgonine, Ur Not Detected   Final 07/17/2019 11:10 AM [facility]   Buprenorphine Urine Not Detected   Final 07/17/2019 11:10 AM [facility]   Carisoprodol, Ur Not Detected   Final 07/17/2019 11:10 AM [facility]   (NOTE)   The carisoprodol immunoassay has cross-reactivity to carisoprodol   and meprobamate.     Clonazepam, Urine Not Detected   Final 07/17/2019 11:10 AM [facility]   Codeine, Urine Not Detected   Final 07/17/2019 11:10 AM [facility]   MDA, Ur Not Detected   Final 07/17/2019 11:10 AM [facility]   Diazepam, Urine Not Detected   Final 07/17/2019 11:10 AM [facility]   Ethyl Glucuronide Ur Not Detected   Final 07/17/2019 11:10 AM [facility]   Fentanyl, Ur Not Detected   Final 07/17/2019 11:10 AM [facility]   Hydrocodone, Urine Not Detected   Final 07/17/2019 11:10 AM [facility]   Hydromorphone, Urine Not Detected   Final 07/17/2019 11:10 AM [facility]   Lorazepam, Urine Not Detected   Final 07/17/2019 11:10 AM [facility]   Marijuana Metab, Ur Not Detected   Final 07/17/2019 11:10 AM [facility]   MDEA, MARYLU, Ur Not Detected   Final 07/17/2019 11:10 AM [facility]   MDMA, Urine Not Detected   Final 07/17/2019 11:10 AM [facility]   Meperidine Metab, Ur Not Detected   Final 07/17/2019 11:10 AM [facility]   Methadone, Urine Not Detected   Final 07/17/2019 11:10 AM [facility]   Methamphetamine, Urine Not Detected   Final 07/17/2019 11:10 AM [facility]   Methylphenidate Not Detected   Final 07/17/2019 11:10 AM [facility]   Midazolam, Urine Not Detected   Final 07/17/2019 11:10 AM [facility]   Morphine Urine Not Detected   Final 07/17/2019 11:10 AM [facility]   Norbuprenorphine, Urine Not Detected   Final 07/17/2019 11:10 AM [facility]   Nordiazepam, Urine Not Detected   Final 07/17/2019 11:10 AM [facility]   Norfentanyl, Urine Not Detected   Final 07/17/2019 11:10 AM [facility]   NORHYDROCODONE, URINE Not Detected   Final 07/17/2019 11:10 AM [facility]   Noroxycodone, Urine Not Detected   Final 07/17/2019 11:10 AM [facility]   NOROXYMORPHONE, URINE Not Detected   Final 07/17/2019 11:10 AM [facility]   Oxazepam, Urine Not Detected   Final 07/17/2019 11:10 AM [facility]   Oxycodone Urine Not Detected   Final 07/17/2019 11:10 AM [facility]   Oxymorphone, Urine Not Detected   Final 07/17/2019 11:10 AM [facility]   PCP, Urine Not Detected   Final 07/17/2019 11:10 AM [facility]   Phentermine, Ur Not Detected   Final 07/17/2019 11:10 AM [facility]   Propoxyphene, Urine Not Detected   Final 07/17/2019 11:10 AM [facility]   Tapentadol-O-Sulfate, Urine Not Detected   Final 07/17/2019 11:10 AM [facility]

## 2020-06-17 ENCOUNTER — HOSPITAL ENCOUNTER (OUTPATIENT)
Dept: PAIN MANAGEMENT | Age: 75
Discharge: HOME | End: 2020-06-17
Payer: MEDICARE

## 2020-06-17 PROCEDURE — 99213 OFFICE O/P EST LOW 20 MIN: CPT

## 2020-06-17 PROCEDURE — 99213 OFFICE O/P EST LOW 20 MIN: CPT | Performed by: NURSE PRACTITIONER

## 2020-06-17 RX ORDER — HYDROCODONE BITARTRATE AND ACETAMINOPHEN 5; 325 MG/1; MG/1
1 TABLET ORAL 2 TIMES DAILY PRN
Qty: 60 TABLET | Refills: 0 | Status: SHIPPED | OUTPATIENT
Start: 2020-06-21 | End: 2020-07-17 | Stop reason: SDUPTHER

## 2020-06-17 ASSESSMENT — ENCOUNTER SYMPTOMS
BACK PAIN: 1
GASTROINTESTINAL NEGATIVE: 1
EYES NEGATIVE: 1

## 2020-06-17 NOTE — PROGRESS NOTES
lunch, Disp: , Rfl:     Acetaminophen (TYLENOL 8 HOUR ARTHRITIS PAIN PO), Take by mouth, Disp: , Rfl:     buPROPion (WELLBUTRIN XL) 300 MG extended release tablet, Take 1 tablet by mouth every morning, Disp: 90 tablet, Rfl: 1    Family History   Problem Relation Age of Onset    Heart Disease Father        Social History     Socioeconomic History    Marital status:      Spouse name: Not on file    Number of children: 2    Years of education: 15    Highest education level: Not on file   Occupational History    Occupation: retired   Social Needs    Financial resource strain: Not on file    Food insecurity     Worry: Not on file     Inability: Not on file   [employer] needs     Medical: Not on file     Non-medical: Not on file   Tobacco Use    Smoking status: Current Every Day Smoker     Packs/day: 1.00     Years: 32.00     Pack years: 32.00    Smokeless tobacco: Never Used    Tobacco comment: 1 pack a day   Substance and Sexual Activity    Alcohol use: Yes     Comment: occ. wine or rum.  Drug use: No    Sexual activity: Not on file   Lifestyle    Physical activity     Days per week: Not on file     Minutes per session: Not on file    Stress: Not on file   Relationships    Social connections     Talks on phone: Not on file     Gets together: Not on file     Attends Restorationist service: Not on file     Active member of club or organization: Not on file     Attends meetings of clubs or organizations: Not on file     Relationship status: Not on file    Intimate partner violence     Fear of current or ex partner: Not on file     Emotionally abused: Not on file     Physically abused: Not on file     Forced sexual activity: Not on file   Other Topics Concern    Not on file   Social History Narrative    Not on file         Review of Systems:  Review of Systems   Constitution: Negative. HENT: Negative. Eyes: Negative. Cardiovascular: Negative.     Respiratory:        Smokes Endocrine: Negative. Hematologic/Lymphatic: Bruises/bleeds easily. Skin: Negative. Musculoskeletal: Positive for back pain and joint pain. Hip pain   Gastrointestinal: Negative. Genitourinary: Negative. Neurological: Negative. Psychiatric/Behavioral: The patient is nervous/anxious. Managing         Physical Exam:    Physical Exam  HENT:      Head: Normocephalic. Pulmonary:      Effort: Pulmonary effort is normal.   Skin:         Neurological:      Mental Status: She is alert. Psychiatric:         Mood and Affect: Mood normal.         Behavior: Behavior normal.         Thought Content: Thought content normal.           Assessment:    Problem List Items Addressed This Visit     Spinal stenosis, lumbar region, without neurogenic claudication - Primary (Chronic)    Relevant Medications    HYDROcodone-acetaminophen (NORCO) 5-325 MG per tablet (Start on 6/21/2020)    Medication monitoring encounter    Lumbosacral spondylosis without myelopathy (Chronic)    Relevant Medications    HYDROcodone-acetaminophen (NORCO) 5-325 MG per tablet (Start on 6/21/2020)    Lumbar radiculopathy, chronic    Relevant Medications    HYDROcodone-acetaminophen (NORCO) 5-325 MG per tablet (Start on 6/21/2020)    Degeneration of lumbar or lumbosacral intervertebral disc (Chronic)    Relevant Medications    HYDROcodone-acetaminophen (NORCO) 5-325 MG per tablet (Start on 6/21/2020)              Treatment Plan:  DISCUSSION: Treatment options discussed withpatient and all questions answered to patient's satisfaction.      Possible side effects, risk of tolerance and or dependence and alternative treatments discussed    Obtaining appropriate analgesic effect of treatment   No signs of potential drug abuse or diversion identified    [x] Ill effects of being on chronic pain medications such as sleep disturbances, respiratory depression, hormonal changes, withdrawal symptoms, chronic opioid dependence and tolerance as

## 2020-07-17 ENCOUNTER — HOSPITAL ENCOUNTER (OUTPATIENT)
Dept: PAIN MANAGEMENT | Age: 75
Discharge: HOME | End: 2020-07-17
Payer: MEDICARE

## 2020-07-17 PROCEDURE — 99213 OFFICE O/P EST LOW 20 MIN: CPT

## 2020-07-17 PROCEDURE — 99213 OFFICE O/P EST LOW 20 MIN: CPT | Performed by: NURSE PRACTITIONER

## 2020-07-17 RX ORDER — HYDROCODONE BITARTRATE AND ACETAMINOPHEN 5; 325 MG/1; MG/1
1 TABLET ORAL 2 TIMES DAILY PRN
Qty: 60 TABLET | Refills: 0 | Status: SHIPPED | OUTPATIENT
Start: 2020-07-22 | End: 2020-08-18 | Stop reason: SDUPTHER

## 2020-07-17 ASSESSMENT — ENCOUNTER SYMPTOMS
EYES NEGATIVE: 1
GASTROINTESTINAL NEGATIVE: 1
BACK PAIN: 1

## 2020-07-17 NOTE — PROGRESS NOTES
Alexy 89 PROGRESS NOTE      Patient  Video visit to  review Medication Agreement    Chief Complaint: low back pain    HPI: She c/o low back pain which has not changed. The pain radiates down her legs, She did PT this past January which helped. No history of lumbar surgery. She is sleeping well. She continues to smoke. She had hypnosis in the past and had quit for 10 years. She does some back exercises. No Ed visits. Back Pain   This is a chronic problem. The problem occurs constantly. The problem is unchanged. The pain is present in the lumbar spine. The quality of the pain is described as aching. Radiates to: at times down legs. The pain is at a severity of 4/10. The pain is moderate. The pain is the same all the time. The symptoms are aggravated by bending and standing. Risk factors include menopause and history of osteoporosis. She has tried analgesics, heat and ice for the symptoms. The treatment provided mild relief. Patient denies any new neurological symptoms. No bowel or bladder incontinence, no weakness, and no falling. Any new diagnostic workup: [x] no  [] yes [] Xray [] CT scan [] MRI [] DEXA scan     [] Other                    Treatment goals:  Functional status: to do activiites    Aberrancy:   Any alcoholic beverages no      Any illegal drugs   no    Analgesia:4      Adverse  Effects :none    ADL;s :back exercises      Pill count: appropriate #10 hydrocodone tabs left    Morphine equivalent dose as reported on [place]:10  Periodic Controlled Substance Monitoring: Possible medication side effects, risk of tolerance/dependence & alternative treatments discussed., No signs of potential drug abuse or diversion identified. , Assessed functional status., Obtaining appropriate analgesic effect of treatment. Manuel Roman, APRN - CNP)  Review of[place]ARRS does not show any aberrant prescription behavior. Medication is helping the patient stay active.  Patient denies any side effects and reports adequate analgesia. No sign of misuse/abuse. When was thelast UDS:   7-          Was the UDS appropriate:yes      Record/Diagnostics Review:      As above, I did review the imaging    7/20/2019 11:52 PM - Naseem, Bulmaro Incoming Lab Results From [facility]     Component  Value  Ref Range & Units  Status  Collected  Lab    Pain Management Drug Panel Interp, Urine  Consistent   Final  07/17/2019 11:10 AM  [facility]    (NOTE)   ________________________________________________________________   DRUGS EXPECTED:   NORCO (HYDROCODONE) [7/15/19]   ________________________________________________________________   CONSISTENT with medications provided:   Ml Hirsch (HYDROCODONE) : based on the absence of hydrocodone and   metabolites   ________________________________________________________________   Drugs Not Included in this Assay:   Acetaminophen   ________________________________________________________________   INTERPRETIVE INFORMATION: [facility] [place], [place] [facility]/EMIT, Ur,                            Interp   Interpretation depends on accuracy and completeness of patient   medication information submitted by client. Past Medical History:   Diagnosis Date    Anxiety     Cancer [facility])     breast cancer, left     CKD (chronic kidney disease) stage 3, GFR 30-59 ml/min ([facility])        Past Surgical History:   Procedure Laterality Date    APPENDECTOMY      BREAST LUMPECTOMY      right fatty tissue removed    BREAST REDUCTION SURGERY Right     BREAST SURGERY Left     left mastectomy w/reconstruction, tram    CATARACT REMOVAL Bilateral 03/03/2016    CHOLECYSTECTOMY      COLONOSCOPY      COLONOSCOPY  10/13/2016    Dr True Adamson TUBAL LIGATION         No Known Allergies      Current Outpatient Medications:     HYDROcodone-acetaminophen (NORCO) 5-325 MG per tablet, Take 1 tablet by mouth 2 times daily as needed for Pain for up to 30 days. , Disp: 60 tablet, Rfl: 0    citalopram (CELEXA) 40 MG tablet, Take 1 tablet by mouth daily, Disp: 90 tablet, Rfl: 2    calcitonin (MIACALCIN) 200 UNIT/ACT nasal spray, 1 spray by Nasal route daily, Disp: 11.1 mL, Rfl: 10    buPROPion (WELLBUTRIN XL) 300 MG extended release tablet, Take 1 tablet by mouth every morning, Disp: 90 tablet, Rfl: 1    Multiple Vitamins-Minerals (MULTIVITAMIN ADULT PO), Take by mouth, Disp: , Rfl:     Calcium Carb-Cholecalciferol (CALCIUM + D3) 600-200 MG-UNIT TABS tablet, Take 1 tablet by mouth Daily with lunch, Disp: , Rfl:     Acetaminophen (TYLENOL 8 HOUR ARTHRITIS PAIN PO), Take by mouth, Disp: , Rfl:     Family History   Problem Relation Age of Onset    Heart Disease Father        Social History     Socioeconomic History    Marital status:      Spouse name: Not on file    Number of children: 2    Years of education: 15    Highest education level: Not on file   Occupational History    Occupation: retired   Social Needs    Financial resource strain: Not on file    Food insecurity     Worry: Not on file     Inability: Not on file   Bloominous needs     Medical: Not on file     Non-medical: Not on file   Tobacco Use    Smoking status: Current Every Day Smoker     Packs/day: 1.00     Years: 32.00     Pack years: 32.00    Smokeless tobacco: Never Used    Tobacco comment: 1 pack a day   Substance and Sexual Activity    Alcohol use: Yes     Comment: occ. wine or rum.     Drug use: No    Sexual activity: Not on file   Lifestyle    Physical activity     Days per week: Not on file     Minutes per session: Not on file    Stress: Not on file   Relationships    Social connections     Talks on phone: Not on file     Gets together: Not on file     Attends Jainism service: Not on file     Active member of club or organization: Not on file     Attends meetings of clubs or organizations: Not on file     Relationship status: Not on file    Intimate partner violence     Fear of current or ex partner: Not on file     Emotionally abused: Not on file     Physically abused: Not on file     Forced sexual activity: Not on file   Other Topics Concern    Not on file   Social History Narrative    Not on file         Review of Systems:  Review of Systems   Constitution: Negative. HENT: Negative. Eyes: Negative. Cardiovascular: Negative. Respiratory:        Smokes   Endocrine: Negative. Hematologic/Lymphatic: Bruises/bleeds easily. Skin: Negative. Musculoskeletal: Positive for back pain and joint pain. Hip pain   Gastrointestinal: Negative. Genitourinary: Negative. Neurological: Negative. Psychiatric/Behavioral: Negative. Physical Exam:      Physical Exam  HENT:      Head: Normocephalic. Skin:         Neurological:      Mental Status: She is alert. Psychiatric:         Mood and Affect: Mood normal.         Behavior: Behavior normal.         Thought Content: Thought content normal.           Assessment:      Problem List Items Addressed This Visit     Spinal stenosis, lumbar region, without neurogenic claudication (Chronic)    Medication monitoring encounter - Primary    Lumbosacral spondylosis without myelopathy (Chronic)    Lumbar radiculopathy, chronic    Degeneration of lumbar or lumbosacral intervertebral disc (Chronic)          Treatment Plan:  DISCUSSION: Treatment options discussed withpatient and all questions answered to patient's satisfaction. Possible side effects, risk of tolerance and or dependence and alternative treatments discussed    Obtaining appropriate analgesic effect of treatment   No signs of potential drug abuse or diversion identified    [x] Ill effects of being on chronic pain medications such as sleep disturbances, respiratory depression, hormonal changes, withdrawal symptoms, chronic opioid dependence and tolerance as well as risk of taking opioids with Benzodiazepines and taking opioids along with alcohol,  werediscussed with patient.  I had asked the patient to minimize medication use and utilize pain medications only for uncontrolled rest pain or pain with exertional activities. I advised patient not to self-escalate painmedications without consulting with us. At each of patient's future visits we will try to taper pain medications, while adjusting the adjunct medications, and re-evaluating for Physical Therapy to improve spinal andjoint strength. We will continue to have discussions to decrease pain medications as tolerated. Counseled patient on effects their pain medication and /or their medical condition mayhave on their  ability to drive or operate machinery. Instructed not to drive or operate machinery if drowsy     I also discussed with the patient regarding the dangers of combining narcotic pain medication with tranquilizers, alcohol or illegal drugs or taking the medication any way other than prescribed. The dangers were discussed  including respiratory depression and death. Patient was told to tell  all  physicians regarding the medications he is getting from pain clinic. Patient is warned not to take any unprescribed medications over-the-countermedications that can depress breathing . Patient is advised to talk to the pharmacist or physicians if planning to take any over-the-counter medications before  takeing them. Patient is strongly advised to avoid tranquilizers or  relaxants, illegal drugs  or any medications that can depress breathing  Patient is also advised to tell us if there is any changes in their medications from other physicians.     Location:  patient  at   home    , provider working from home    TREATMENT OPTIONS:       Medication Agreement Requirements Met  Continue Opioid therapy  Script written for hydrocodne  Follow up appointment made

## 2020-08-17 ASSESSMENT — ENCOUNTER SYMPTOMS
SHORTNESS OF BREATH: 0
PHOTOPHOBIA: 0
EYES NEGATIVE: 1
NAUSEA: 0
GASTROINTESTINAL NEGATIVE: 1
COLOR CHANGE: 0
BOWEL INCONTINENCE: 0
EYE REDNESS: 0
VOMITING: 0
ALLERGIC/IMMUNOLOGIC NEGATIVE: 1
BACK PAIN: 1
CONSTIPATION: 0
RESPIRATORY NEGATIVE: 1
COUGH: 0
ABDOMINAL PAIN: 0

## 2020-08-17 NOTE — PROGRESS NOTES
Kael Ferreira is a 77 y.o. female evaluated on 8/18/2020. Modality of virtual service provided -via Video+audio     Consent:  Patient and/or health care decision maker is aware that that patient may receive a bill for this telephone service, depending on one's insurance coverage, and has provided verbal consent to proceed: Yes    Patient identification was verified at the start of the visit: Yes    Chief complaint: Kael Ferreira is 77 y.o.,  female, with back pain. Patient is complaining of pain involving the low back area with pain radiating to the right lower extremity to the foot. Patient reports her pain is gradually getting worse she previously had lumbar epidural steroid injection with improvement in the pain. She reports she did physical therapy and they did some stretching in [place] which did help her pain. Patient reports her pain is gradually getting worse for the last 1 week. Back Pain   This is a chronic problem. The current episode started more than 1 year ago. The problem occurs constantly. The problem has been gradually worsening (Somewhat worse compared to the last visit) since onset. The pain is present in the lumbar spine, gluteal and sacro-iliac. The quality of the pain is described as aching Pepe Safer). The pain radiates to the right foot (Left lower extremity). The pain is at a severity of 7/10 (2-9). The pain is severe. The pain is worse during the day. The symptoms are aggravated by bending, standing, sitting, twisting and position (Walking lifting and ADLs). Associated symptoms include weakness. Pertinent negatives include no abdominal pain, bladder incontinence, bowel incontinence, chest pain, dysuria, fever, numbness, paresthesias or tingling. Risk factors include lack of exercise and sedentary lifestyle (Smoking). Alleviating factors:streaching. Ice or heat  Lifestyle changes experienced with pain: Prevents or limits ADLs, Increases w/activity. Increases w/prolonged sitting/standing/walking  Mood changes,nnone  Patient currently unemployed. Physical therapy did help the pain. Are you under psychological counseling at present: No  Goals for treatment include:  Decrease in pain  Enjoy daily and recreational activities, return to previous status. Patient relates current medications are helping the pain. Patient reports taking pain medications as prescribed, denies obtaining medications from different sources and denies use of illegal drugs. Patient denies side effects from medications like nausea, vomiting, constipation or drowsiness. Patient reports current activities of daily living ar possible due to medications and would like to continue them. ACTIVITY/SOCIAL/EMOTIONAL:  Sleep Pattern: 8 hours per night.  generally restful sleep  Home Exercises:  Stretches  Activity:not significantly changed  Emotional Issues: normal.   Currently seeing a Psychiatrist or Psychologist:  No     ADVERSE MEDICATION EFFECTS:   Nausea and vomiting: no   Constipation: no-Undercontrol-: not applicable  Dizziness/drowsy/sleepy--no  Urinary Retention: no    ABERRANT BEHAVIORS SINCE LAST VISIT  Lost rx/pills:------------------------------------------ no  Taking  medication as prescribed: ----------- yes  Urine Drug Screen ---------------------------------  yes  Recent ER visits: -------------------------------------No  Pill count is appropriate: ---------------------------yes   Refills for prescriptions appropriate:---------- yes      Past Medical History:   Diagnosis Date    Anxiety     Cancer ([facility] [address]     breast cancer, left     CKD (chronic kidney disease) stage 3, GFR 30-59 ml/min ([facility])        Past Surgical History:   Procedure Laterality Date    APPENDECTOMY      BREAST LUMPECTOMY      right fatty tissue removed    BREAST REDUCTION SURGERY Right     BREAST SURGERY Left     left mastectomy w/reconstruction, tram    CATARACT REMOVAL Bilateral 03/03/2016    CHOLECYSTECTOMY      COLONOSCOPY      COLONOSCOPY  10/13/2016    Dr Sylvia Sherman TUBAL LIGATION         Family History   Problem Relation Age of Onset    Heart Disease Father        Social History     Socioeconomic History    Marital status:      Spouse name: None    Number of children: 2    Years of education: 15    Highest education level: None   Occupational History    Occupation: retired   Social Needs    Financial resource strain: None    Food insecurity     Worry: None     Inability: None    Transportation needs     Medical: None     Non-medical: None   Tobacco Use    Smoking status: Current Every Day Smoker     Packs/day: 1.00     Years: 32.00     Pack years: 32.00    Smokeless tobacco: Never Used    Tobacco comment: 1 pack a day   Substance and Sexual Activity    Alcohol use: Yes     Comment: occ. wine or rum.     Drug use: No    Sexual activity: None   Lifestyle    Physical activity     Days per week: None     Minutes per session: None    Stress: None   Relationships    Social connections     Talks on phone: None     Gets together: None     Attends Evangelical service: None     Active member of club or organization: None     Attends meetings of clubs or organizations: None     Relationship status: None    Intimate partner violence     Fear of current or ex partner: None     Emotionally abused: None     Physically abused: None     Forced sexual activity: None   Other Topics Concern    None   Social History Narrative    None       No Known Allergies    Current Outpatient Medications on File Prior to Encounter   Medication Sig Dispense Refill    Acetaminophen (TYLENOL 8 HOUR ARTHRITIS PAIN PO) Take by mouth      citalopram (CELEXA) 40 MG tablet Take 1 tablet by mouth daily 90 tablet 2    calcitonin (MIACALCIN) 200 UNIT/ACT nasal spray 1 spray by Nasal route daily 11.1 mL 10    buPROPion (WELLBUTRIN XL) 300 MG extended release tablet Take 1 tablet by mouth every morning 90 tablet 1  Multiple Vitamins-Minerals (MULTIVITAMIN ADULT PO) Take by mouth      Calcium Carb-Cholecalciferol (CALCIUM + D3) 600-200 MG-UNIT TABS tablet Take 1 tablet by mouth Daily with lunch       No current facility-administered medications on file prior to encounter. Review of Systems   Constitutional: Negative. Negative for activity change, appetite change, diaphoresis, fever and unexpected weight change. HENT: Negative. Negative for congestion, hearing loss and sinus pressure. Eyes: Negative. Negative for photophobia, redness and visual disturbance. Respiratory: Negative. Negative for cough and shortness of breath. Cardiovascular: Negative. Negative for chest pain and palpitations. Gastrointestinal: Negative. Negative for abdominal pain, bowel incontinence, constipation, nausea and vomiting. Endocrine: Negative. Negative for cold intolerance and polyphagia. Genitourinary: Negative. Negative for bladder incontinence, dysuria and hematuria. Musculoskeletal: Positive for arthralgias and back pain. Negative for myalgias. Skin: Negative. Negative for color change and rash. Allergic/Immunologic: Negative. Neurological: Positive for weakness. Negative for tingling, numbness and paresthesias. Both legs   Hematological: Negative. Does not bruise/bleed easily. Psychiatric/Behavioral: Negative. Negative for self-injury, sleep disturbance and suicidal ideas. The patient is not nervous/anxious. Physical Exam  Constitutional:       Appearance: Normal appearance. Skin:         Neurological:      Mental Status: She is alert and oriented to person, place, and time. Psychiatric:         Mood and Affect: Mood normal.         Behavior: Behavior normal.         Thought Content:  Thought content normal.         Judgment: Judgment normal.            DATA:  LAB.:  4/27/2020 10:50 AM - Bulmaro Gusman Incoming Lab Results From [facility]     Component  Value  Ref Range & Units  Status Collected  Lab    Glucose  98  70 - 99 mg/dL  Final  04/27/2020 10:12 AM  [facility] [address] Lab    BUN  24High    8 - 23 mg/dL  Final  04/27/2020 10:12 AM  [facility]  1. 33High    0.50 - 0.90 mg/dL  Final  04/27/2020 10:12 AM  [facility] [address] Lab    Bun/Cre Ratio  NOT REPORTED  9 - 20  Final  04/27/2020 10:12 AM  [facility] [address] Lab    Calcium  9.4  8.6 - 10.4 mg/dL  Final  04/27/2020 10:12 AM  [facility] [address] Lab    Sodium  140  135 - 144 mmol/L  Final  04/27/2020 10:12 AM  [address] Lab    Potassium  4.8  3.7 - 5.3 mmol/L  Final  04/27/2020 10:12 AM  [facility] [address] Lab    Chloride  106  98 - 107 mmol/L  Final  04/27/2020 10:12 AM  [facility] [address] Lab    CO2  24  20 - 31 mmol/L  Final  04/27/2020 10:12 AM  [facility] [address] Lab    Anion Gap  10  9 - 17 mmol/L  Final  04/27/2020 10:12 AM  [facility] [address] Lab    GFR Non-  39Low    >60 mL/min  Final  04/27/2020 10:12 AM  [facility] [address] Lab    GFR   47Low    >60 mL/min  Final  04/27/2020 10:12 AM  [address] Lab    GFR Comment                  X-Ray reports:     EXAMINATION:    BONE DENSITOMETRY        10/12/2017 8:08 am        TECHNIQUE:    A bone density dual x-ray absorptiometry (DXA) scan was performed of the    lumbar spine and left hip on a [place] system. COMPARISON:    08/29/2005        HISTORY:    ORDERING SYSTEM PROVIDED HISTORY: Osteoporosis screening        Postmenopausal, tobacco user        FINDINGS:    FINDINGS:        Lumbar spine:            L1-L4 BMD: 1.063 g/cm2, young adult/T.-score: -1.1, age-matched Z.-score: 1.0. There has been 12.5% decrease in the fluid lumbar BMD since the prior study. Left hip:            Total: 0.801 g/cm2, young adult/T.-score: -1.7, age-matched Z.-score: 0.2. Neck: 0.772 g/cm2, young adult/T.-score: -1.9, age-matched Z.-score: 0.1. There has been 18% decrease in left hip total BMD since the prior study. Impression    According to the World Health Organization [facility]) classification:        Osteopenia. Fracture risk is moderate. Treatment is advised. COMPARISON:    CT abdomen pelvis 05/11/2017        HISTORY:    ORDERING SYSTEM PROVIDED HISTORY: Left hip pain    TECHNOLOGIST PROVIDED HISTORY:    pain    Ordering Physician Provided Reason for Exam: PT CO chronic L hip pain X 1    year with [facility]. Acuity: Chronic    Type of Exam: Initial        FINDINGS:    No evidence of acute fracture or dislocation. Bone mineralization and    alignment appear intact. Hip joints appear relatively symmetric with mild    degenerative changes. Enthesopathic spurring of the left greater trochanter. Mild degenerative changes of the sacroiliac joints and pubic symphysis. Partially visualized degenerative changes lower lumbar spine which appears to    be most prominently involving the facet joints. Impression    No acute osseous abnormality. Mild degenerative changes. EXAMINATION:    3 XRAY VIEWS OF THE LUMBAR SPINE        10/15/2019 11:08 am        COMPARISON:    None. HISTORY:    ORDERING SYSTEM PROVIDED HISTORY: Degeneration of lumbar or lumbosacral    intervertebral disc    TECHNOLOGIST PROVIDED HISTORY:    pain    Reason for Exam: Pt states low back pain. Many years. Pt has scoliosis. Acuity: Unknown    Type of Exam: Unknown        FINDINGS:    Levoscoliosis of the lumbar spine. Scoliosis measures 37 degrees from L1    through L3. Multilevel degenerative changes with disc height loss and facet    arthropathy. Intact spinous processes. Vascular calcifications. Impression    Degenerative-type scoliosis of the lumbar spine with multilevel degenerative    change. Clinical  impression:  1. Lumbar radiculopathy, chronic    2.  Degeneration of lumbar or lumbosacral intervertebral disc alcohol or illegal drugs or taking the medication any other than prescribed. The dangers including the respiratory depression and death. Patient was told to tell  to all  physicians regarding the medications he is getting from pain clinic. Patient is warned not to take any unprescribed medications over-the-counter medications that can depress breathing . Patient is advised to talk to the pharmacist or physicians if planning to take any over-the-counter medications before  takeing them. Patient is strongly advised to avoid tranquilizers or  Relaxants for any medications that can depress breathing or recreational drugs. Patient is also advised to tell us if there is any changes in his medications from other physicians. We discussed the same at today's visit and have not been to implement it, as the patient's pain is not under control with current medications. I discussed with the patient importance of exercise and core strengthening. Link Tsang pain is getting worse will refer her to physical therapy again as it did help her pain in the past.  Depending on the response to therapy plan further  Orders Placed This Encounter   Procedures    PT aquatic therapy     TBD by Pain Clinic MD     Standing Status:   Future     Standing Expiration Date:   8/18/2021    PT eval and treat     TBD by Pain Clinic MD     Standing Status:   Future     Standing Expiration Date:   8/18/2021       Decision Making Process : Patient's health history and referral records thoroughly reviewed before focused physical examination and discussion with patient. I have spent 20 mins. Over 50% of today's visit is spent on examining the patient and counseling and coordinating the care. Level of complexity of date to be reviewed is Moderate. The chart date reviewed include the following: Imaging Reports. Summary of Care. Time spent reviewing with patient the below reports:   Medication safety, Treatment options.     Level of diagnosis and management options of this case is multiple: involving the following management options: Interventions as needed, medication management as appropriate, future visits, activity modification, heat/ice as needed, Urine drug screen as required. [x]The patient's questions were answered to the best of my abilities. This note was created using voice recognition software. There may be inaccuracies of transcription  that are inadvertently overlooked prior to the signature. There is any questions about the transcription please contact me. Return in  4 weeks  with physician / CNP  for further plan of treatment. Due to the COVID-19 pandemic and the appropriate interventions by Lee Brown, our non-urgent pain management patients will not be seen in the office at this time for their protection and the protection of our staff. To offer continuity of care, their prescriptions will be escribed this month after a careful chart review and review of their OARRS report  Pursuant to the emergency declaration under the [address] and [place] [address] waiver authority and the [employer] Act, this Virtual Visit was conducted, with patient's consent, to reduce the patient's risk of exposure to COVID-19 and provide continuity of care for an established patient. Services were provided through a video synchronous discussion virtually to substitute for in-person appointment. \"  Documentation:  I communicated with the patient and/or health care decision maker about plan of care  Details of this discussion including any medical advice provided: Total Time: minutes: 21-30 minutes    I affirm this is a Patient Initiated Episode with an Established Patient who has not had a related appointment within my department in the past 7 days or scheduled within the next 24 hours.     Electronically signed by Patricio Kim MD on 8/18/2020

## 2020-08-18 ENCOUNTER — HOSPITAL ENCOUNTER (OUTPATIENT)
Dept: PAIN MANAGEMENT | Age: 75
Discharge: HOME | End: 2020-08-18
Payer: MEDICARE

## 2020-08-18 PROCEDURE — 99213 OFFICE O/P EST LOW 20 MIN: CPT

## 2020-08-18 PROCEDURE — 99214 OFFICE O/P EST MOD 30 MIN: CPT | Performed by: PAIN MEDICINE

## 2020-08-18 RX ORDER — HYDROCODONE BITARTRATE AND ACETAMINOPHEN 5; 325 MG/1; MG/1
1 TABLET ORAL 2 TIMES DAILY PRN
Qty: 60 TABLET | Refills: 0 | Status: SHIPPED | OUTPATIENT
Start: 2020-08-21 | End: 2020-09-16 | Stop reason: SDUPTHER

## 2020-08-18 ASSESSMENT — ENCOUNTER SYMPTOMS: SINUS PRESSURE: 0

## 2020-09-08 RX ORDER — BUPROPION HYDROCHLORIDE 300 MG/1
300 TABLET ORAL EVERY MORNING
Qty: 90 TABLET | Refills: 1 | Status: SHIPPED | OUTPATIENT
Start: 2020-09-08 | End: 2021-01-07 | Stop reason: SDUPTHER

## 2020-09-16 ENCOUNTER — HOSPITAL ENCOUNTER (OUTPATIENT)
Dept: PAIN MANAGEMENT | Age: 75
Discharge: HOME | End: 2020-09-16
Payer: MEDICARE

## 2020-09-16 PROCEDURE — 99441 PR PHYS/QHP TELEPHONE EVALUATION 5-10 MIN: CPT | Performed by: NURSE PRACTITIONER

## 2020-09-16 PROCEDURE — 99213 OFFICE O/P EST LOW 20 MIN: CPT

## 2020-09-16 RX ORDER — HYDROCODONE BITARTRATE AND ACETAMINOPHEN 5; 325 MG/1; MG/1
1 TABLET ORAL 2 TIMES DAILY PRN
Qty: 60 TABLET | Refills: 0 | Status: SHIPPED | OUTPATIENT
Start: 2020-09-20 | End: 2020-10-16 | Stop reason: SDUPTHER

## 2020-09-16 ASSESSMENT — ENCOUNTER SYMPTOMS
GASTROINTESTINAL NEGATIVE: 1
EYES NEGATIVE: 1
BACK PAIN: 1

## 2020-09-16 NOTE — PROGRESS NOTES
[facility] [address] PROGRESS NOTE      Patient  Phone call to  review Medication Agreement    Chief Complaint: low back pain    She c/o low back pain radiating down right leg. Her pain has increased. No history of lumbar surgery, She wants to start PT and needs a referral. She is not sleeping as well, She does home exercises. No Ed visits. She continues to smoke. Back Pain   This is a chronic problem. The problem occurs intermittently. The problem has been gradually worsening since onset. The pain is present in the lumbar spine. The quality of the pain is described as aching (sharp, achey). Radiates to: right leg. The pain is at a severity of 5/10. The pain is moderate. The pain is the same all the time. Exacerbated by: nothing. (Cramps right foot) Risk factors: osteopenia. She has tried bed rest and analgesics for the symptoms. The treatment provided mild relief. Patient denies any new neurological symptoms. No bowel or bladder incontinence, no weakness, and no falling. Any new diagnostic workup: [x] no  [] yes [] Xray [] CT scan [] MRI [] DEXA scan     [] Other                    Treatment goals:  Functional status: reduce pain    Aberrancy:   Any alcoholic beverages    no   Any illegal drugs   no      Analgesia:5      Adverse  Effects :none  ADL;s :home exercise      Pill count: appropriate fill date 9-    Morphine equivalent dose as reported on OARRS:10  Periodic Controlled Substance Monitoring: Possible medication side effects, risk of tolerance/dependence & alternative treatments discussed., No signs of potential drug abuse or diversion identified. , Assessed functional status., Obtaining appropriate analgesic effect of treatment. Matt Hidden, APRN - CNP)  Review ofOARRS does not show any aberrant prescription behavior. Medication is helping the patient stay active. Patient denies any side effects and reports adequate analgesia. No sign of misuse/abuse.         When was themiguet UDS:   7-          Was the UDS appropriate:yes      Record/Diagnostics Review:      As above, I did review the imaging    7/20/2019 11:52 PM - Naseem, Bulmaro Incoming Lab Results From [facility]     Component  Value  Ref Range & Units  Status  Collected  Lab    Pain Management Drug Panel Interp, Urine  Consistent   Final  07/17/2019 11:10 AM  [facility]    (NOTE)   ________________________________________________________________   DRUGS EXPECTED:   NORCO (HYDROCODONE) [7/15/19]   ________________________________________________________________   CONSISTENT with medications provided:   [address] (HYDROCODONE) : based on the absence of hydrocodone and   metabolites   ________________________________________________________________   Drugs Not Included in this Assay:   Acetaminophen   ________________________________________________________________   INTERPRETIVE INFORMATION: Pain Mgt [place], [place] Spec/EMIT, Ur,                            Interp   Interpretation depends on accuracy and completeness of patient   medication information submitted by client.     6-Acetylmorphine, Ur  Not Detected                Past Medical History:   Diagnosis Date    Anxiety     Cancer [facility])     breast cancer, left     CKD (chronic kidney disease) stage 3, GFR 30-59 ml/min ([facility])        Past Surgical History:   Procedure Laterality Date    APPENDECTOMY      BREAST LUMPECTOMY      right fatty tissue removed    BREAST REDUCTION SURGERY Right     BREAST SURGERY Left     left mastectomy w/reconstruction, tram    CATARACT REMOVAL Bilateral 03/03/2016    CHOLECYSTECTOMY      COLONOSCOPY      COLONOSCOPY  10/13/2016    Dr Maile Menjivar         No Known Allergies      Current Outpatient Medications:     buPROPion (WELLBUTRIN XL) 300 MG extended release tablet, TAKE 1 TABLET BY MOUTH EVERY MORNING, Disp: 90 tablet, Rfl: 1    HYDROcodone-acetaminophen (NORCO) 5-325 MG per tablet, Take 1 tablet by mouth 2 times daily as needed for abused: Not on file     Forced sexual activity: Not on file   Other Topics Concern    Not on file   Social History Narrative    Not on file         Review of Systems:  Review of Systems   Constitution: Negative. HENT: Negative. Eyes: Negative. Cardiovascular: Negative. Respiratory:        Smokes   Endocrine: Negative. Hematologic/Lymphatic: Bruises/bleeds easily. Skin: Negative. Musculoskeletal: Positive for back pain and joint pain. Right hip   Gastrointestinal: Negative. Genitourinary: Negative. Neurological: Positive for loss of balance. Psychiatric/Behavioral: Hallucinations: managing. The patient is nervous/anxious. Physical Exam:    Physical Exam  Skin:         Neurological:      Mental Status: She is alert and oriented to person, place, and time.    Psychiatric:         Mood and Affect: Mood normal.           Assessment:      Problem List Items Addressed This Visit     Spinal stenosis, lumbar region, without neurogenic claudication - Primary (Chronic)    Relevant Medications    HYDROcodone-acetaminophen (NORCO) 5-325 MG per tablet (Start on 9/20/2020)    Other Relevant Orders    External Referral To Physical Therapy    Medication monitoring encounter    Relevant Orders    DRUG SCREEN, PAIN    Lumbosacral spondylosis without myelopathy (Chronic)    Relevant Medications    HYDROcodone-acetaminophen (NORCO) 5-325 MG per tablet (Start on 9/20/2020)    Other Relevant Orders    External Referral To Physical Therapy    Lumbar radiculopathy, chronic    Relevant Medications    HYDROcodone-acetaminophen (NORCO) 5-325 MG per tablet (Start on 9/20/2020)    Degeneration of lumbar or lumbosacral intervertebral disc (Chronic)    Relevant Medications    HYDROcodone-acetaminophen (NORCO) 5-325 MG per tablet (Start on 9/20/2020)    Other Relevant Orders    DRUG SCREEN, PAIN    External Referral To Physical Therapy            Treatment Plan:  DISCUSSION: Treatment options discussed withpatient and all questions answered to patient's satisfaction. Possible side effects, risk of tolerance and or dependence and alternative treatments discussed    Obtaining appropriate analgesic effect of treatment   No signs of potential drug abuse or diversion identified    [x] Ill effects of being on chronic pain medications such as sleep disturbances, respiratory depression, hormonal changes, withdrawal symptoms, chronic opioid dependence and tolerance as well as risk of taking opioids with Benzodiazepines and taking opioids along with alcohol,  werediscussed with patient. I had asked the patient to minimize medication use and utilize pain medications only for uncontrolled rest pain or pain with exertional activities. I advised patient not to self-escalate painmedications without consulting with us. At each of patient's future visits we will try to taper pain medications, while adjusting the adjunct medications, and re-evaluating for Physical Therapy to improve spinal andjoint strength. We will continue to have discussions to decrease pain medications as tolerated. Counseled patient on effects their pain medication and /or their medical condition mayhave on their  ability to drive or operate machinery. Instructed not to drive or operate machinery if drowsy     I also discussed with the patient regarding the dangers of combining narcotic pain medication with tranquilizers, alcohol or illegal drugs or taking the medication any way other than prescribed. The dangers were discussed  including respiratory depression and death. Patient was told to tell  all  physicians regarding the medications he is getting from pain clinic. Patient is warned not to take any unprescribed medications over-the-countermedications that can depress breathing . Patient is advised to talk to the pharmacist or physicians if planning to take any over-the-counter medications before  takeing them.  Patient is strongly advised to avoid tranquilizers or  relaxants, illegal drugs  or any medications that can depress breathing  Patient is also advised to tell us if there is any changes in their medications from other physicians. 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 UDT, instructed to go to outpt lab by tomorrow    2.  Referral Physical therapy to improve strength and mobility, reduce pain    TREATMENT OPTIONS:     Referral Physical therapy  UDT  Medication Agreement Requirements Met  Continue Opioid therapy  Script written for hydrocodone  Follow up appointment made

## 2020-09-17 ENCOUNTER — HOSPITAL ENCOUNTER (OUTPATIENT)
Age: 75
Discharge: HOME | End: 2020-09-17
Payer: MEDICARE

## 2020-09-17 PROCEDURE — 80307 DRUG TEST PRSMV CHEM ANLYZR: CPT

## 2020-09-21 LAB
6-ACETYLMORPHINE, UR: NOT DETECTED
7-AMINOCLONAZEPAM, URINE: NOT DETECTED
ALPHA-OH-ALPRAZ, URINE: NOT DETECTED
ALPRAZOLAM, URINE: NOT DETECTED
AMPHETAMINES, URINE: NOT DETECTED
BARBITURATES, URINE: NOT DETECTED
BENZOYLECGONINE, UR: NOT DETECTED
BUPRENORPHINE URINE: NOT DETECTED
CARISOPRODOL, UR: NOT DETECTED
CLONAZEPAM, URINE: NOT DETECTED
CODEINE, URINE: NOT DETECTED
CREATININE URINE: 137.1 MG/DL (ref 20–400)
DIAZEPAM, URINE: NOT DETECTED
DRUGS EXPECTED, UR: NORMAL
EER HI RES INTERP UR: NORMAL
ETHYL GLUCURONIDE UR: NOT DETECTED
FENTANYL URINE: NOT DETECTED
HYDROCODONE, URINE: PRESENT
HYDROMORPHONE, URINE: NOT DETECTED
LORAZEPAM, URINE: NOT DETECTED
MARIJUANA METAB, UR: NOT DETECTED
MDA, UR: NOT DETECTED
MDEA, EVE, UR: NOT DETECTED
MDMA URINE: NOT DETECTED
MEPERIDINE METAB, UR: NOT DETECTED
METHADONE, URINE: NOT DETECTED
METHAMPHETAMINE, URINE: NOT DETECTED
METHYLPHENIDATE: NOT DETECTED
MIDAZOLAM, URINE: NOT DETECTED
MORPHINE URINE: NOT DETECTED
NORBUPRENORPHINE, URINE: NOT DETECTED
NORDIAZEPAM, URINE: NOT DETECTED
NORFENTANYL, URINE: NOT DETECTED
NORHYDROCODONE, URINE: PRESENT
NOROXYCODONE, URINE: NOT DETECTED
NOROXYMORPHONE, URINE: NOT DETECTED
OXAZEPAM, URINE: NOT DETECTED
OXYCODONE URINE: NOT DETECTED
OXYMORPHONE, URINE: NOT DETECTED
PAIN MANAGEMENT DRUG PANEL INTERP, URINE: NORMAL
PAIN MGT DRUG PANEL, HI RES, UR: NORMAL
PCP,URINE: NOT DETECTED
PHENTERMINE, UR: NOT DETECTED
PROPOXYPHENE, URINE: NOT DETECTED
TAPENTADOL, URINE: NOT DETECTED
TAPENTADOL-O-SULFATE, URINE: NOT DETECTED
TEMAZEPAM, URINE: NOT DETECTED
TRAMADOL, URINE: NOT DETECTED
ZOLPIDEM, URINE: NOT DETECTED

## 2020-09-22 ENCOUNTER — OFFICE VISIT (OUTPATIENT)
Dept: PRIMARY CARE CLINIC | Age: 75
End: 2020-09-22
Payer: MEDICARE

## 2020-09-22 VITALS
HEART RATE: 84 BPM | OXYGEN SATURATION: 98 % | SYSTOLIC BLOOD PRESSURE: 120 MMHG | WEIGHT: 114.2 LBS | DIASTOLIC BLOOD PRESSURE: 60 MMHG | TEMPERATURE: 96.8 F | BODY MASS INDEX: 22.3 KG/M2

## 2020-09-22 PROCEDURE — 4040F PNEUMOC VAC/ADMIN/RCVD: CPT | Performed by: FAMILY MEDICINE

## 2020-09-22 PROCEDURE — G0008 ADMIN INFLUENZA VIRUS VAC: HCPCS | Performed by: FAMILY MEDICINE

## 2020-09-22 PROCEDURE — 99214 OFFICE O/P EST MOD 30 MIN: CPT | Performed by: FAMILY MEDICINE

## 2020-09-22 PROCEDURE — G8427 DOCREV CUR MEDS BY ELIG CLIN: HCPCS | Performed by: FAMILY MEDICINE

## 2020-09-22 PROCEDURE — 90694 VACC AIIV4 NO PRSRV 0.5ML IM: CPT | Performed by: FAMILY MEDICINE

## 2020-09-22 PROCEDURE — 3017F COLORECTAL CA SCREEN DOC REV: CPT | Performed by: FAMILY MEDICINE

## 2020-09-22 PROCEDURE — G8399 PT W/DXA RESULTS DOCUMENT: HCPCS | Performed by: FAMILY MEDICINE

## 2020-09-22 PROCEDURE — 1123F ACP DISCUSS/DSCN MKR DOCD: CPT | Performed by: FAMILY MEDICINE

## 2020-09-22 PROCEDURE — 1090F PRES/ABSN URINE INCON ASSESS: CPT | Performed by: FAMILY MEDICINE

## 2020-09-22 PROCEDURE — G0296 VISIT TO DETERM LDCT ELIG: HCPCS | Performed by: FAMILY MEDICINE

## 2020-09-22 PROCEDURE — 4004F PT TOBACCO SCREEN RCVD TLK: CPT | Performed by: FAMILY MEDICINE

## 2020-09-22 PROCEDURE — G8420 CALC BMI NORM PARAMETERS: HCPCS | Performed by: FAMILY MEDICINE

## 2020-09-22 RX ORDER — CITALOPRAM 20 MG/1
20 TABLET ORAL DAILY
Qty: 90 TABLET | Refills: 0 | Status: SHIPPED | OUTPATIENT
Start: 2020-09-22 | End: 2021-01-06 | Stop reason: SDUPTHER

## 2020-09-22 RX ORDER — BUSPIRONE HYDROCHLORIDE 10 MG/1
10 TABLET ORAL 3 TIMES DAILY
Qty: 90 TABLET | Refills: 1 | Status: SHIPPED | OUTPATIENT
Start: 2020-09-22 | End: 2021-04-22 | Stop reason: SDUPTHER

## 2020-09-22 ASSESSMENT — ENCOUNTER SYMPTOMS: RESPIRATORY NEGATIVE: 1

## 2020-09-22 NOTE — PROGRESS NOTES
Willy Schulte a 77 y.o. female who presents today for her medical conditions/complaints as notedbelow. Chief Complaint   Patient presents with    Depression     5mth f/u    Injections     Flu vaccine         HPI:   HPI  Hx of chronic kidney disease, anemia. 4 # weight loss from last OV  Still tired a lot  Still has back pain  Still a lot of stress: taking care of her SO who has MD. [place] Destini some a help. Still smoking  Gets agitated and anxious when she has to do a lot to do. See PM  LDL Calculated (mg/dL)   Date Value   06/13/2017 96       (goal LDL is <100)   BUN (mg/dL)   Date Value   04/27/2020 24 (H)     BP Readings from Last 3 Encounters:   09/22/20 120/60   04/22/20 (!) 144/76   10/15/19 (!) 126/59          (goal 120/80)    Past Medical History:   Diagnosis Date    Anxiety     Cancer ([facility] [address]     breast cancer, left     CKD (chronic kidney disease) stage 3, GFR 30-59 ml/min ([facility])       Past Surgical History:   Procedure Laterality Date    APPENDECTOMY      BREAST LUMPECTOMY      right fatty tissue removed    BREAST REDUCTION SURGERY Right     BREAST SURGERY Left     left mastectomy w/reconstruction, tram    CATARACT REMOVAL Bilateral 03/03/2016    CHOLECYSTECTOMY      COLONOSCOPY      COLONOSCOPY  10/13/2016    Dr Tyrese Lozada         Family History   Problem Relation Age of Onset    Heart Disease Father        Social History     Tobacco Use    Smoking status: Current Every Day Smoker     Packs/day: 1.00     Years: 32.00     Pack years: 32.00    Smokeless tobacco: Never Used    Tobacco comment: 1 pack a day   Substance Use Topics    Alcohol use: Yes     Comment: occ. wine or rum.       Current Outpatient Medications   Medication Sig Dispense Refill    citalopram (CELEXA) 20 MG tablet Take 1 tablet by mouth daily 90 tablet 0    busPIRone (BUSPAR) 10 MG tablet Take 1 tablet by mouth 3 times daily 90 tablet 1    HYDROcodone-acetaminophen (NORCO) 5-325 MG per tablet icterus. Right eye: No discharge. Left eye: No discharge. Conjunctiva/sclera: Conjunctivae normal.      Pupils: Pupils are equal, round, and reactive to light. Neck:      Thyroid: No thyromegaly. Trachea: No tracheal deviation. Cardiovascular:      Rate and Rhythm: Normal rate and regular rhythm. Heart sounds: Normal heart sounds. Pulmonary:      Effort: Pulmonary effort is normal. No respiratory distress. Breath sounds: Normal breath sounds. No wheezing. Lymphadenopathy:      Cervical: No cervical adenopathy. Skin:     General: Skin is warm. Findings: No rash. Neurological:      Mental Status: She is alert and oriented to person, place, and time. Psychiatric:         Mood and Affect: Mood normal.         Behavior: Behavior normal.         Thought Content: Thought content normal.         Assessment:       Diagnosis Orders   1. CKD (chronic kidney disease) stage 3, GFR 30-59 ml/min ([facility])  Basic Metabolic Panel, Fasting    CBC   2. Chronic anemia  CBC   3. Needs flu shot  INFLUENZA, QUADV, ADJUVANTED, 65 YRS =, IM, PF, PREFILL SYR, 0.5ML (FLUAD)   4. Recurrent major depressive disorder, in partial remission ([facility] [address]     5. Personal history of tobacco use  AR VISIT TO DISCUSS LUNG CA SCREEN W LDCT    CT Lung Screen (Annual)   6. Breast cancer screening by mammogram  SOLOMON DIGITAL SCREEN W OR WO CAD BILATERAL   7. Insomnia, unspecified type  citalopram (CELEXA) 20 MG tablet   8. Depressive disorder  citalopram (CELEXA) 20 MG tablet        Plan:      Return in about 4 weeks (around 10/20/2020) for anxiety. She would like to try something for her severe anxiety. Will try buspar and try decreasing celexa and see if eventually she can get weaned off celexa  Orders Placed This Encounter   Procedures    CT Lung Screen (Annual)     Age: Patient is 77 y.o.     Smoking History: Social History    Tobacco Use      Smoking status: Current Every Day Smoker        Packs/day: 1.00 All patient questions answered. Pt voiced understanding. Reviewed health maintenance. Instructed to continue current medications, diet and exercise. Patient agreed with treatment plan. Follow up as directed. Electronicallysigned by Tio Leong MD on 9/22/2020 at 9:54 AM      Low Dose CT (LDCT) Lung Screening criteria met   Age 50-69   Pack year smoking >30   Still smoking or less than 15 year since quit   No sign or symptoms of lung cancer   > 11 months since last LDCT     Risks and benefits of lung cancer screening with LDCT scans discussed:    Significance of positive screen - False-positive LDCT results often occur. 95% of all positive results do not lead to a diagnosis of cancer. Usually further imaging can resolve most false-positive results; however, some patients may require invasive procedures. Over diagnosis risk - 10% to 12% of screen-detected lung cancer cases are over diagnosed--that is, the cancer would not have been detected in the patient's lifetime without the screening. Need for follow up screens annually to continue lung cancer screening effectiveness     Risks associated with radiation from annual LDCT- Radiation exposure is about the same as for a mammogram, which is about 1/3 of the annual background radiation exposure from everyday life. Starting screening at age 54 is not likely to increase cancer risk from radiation exposure. Patients with comorbidities resulting in life expectancy of < 10 years, or that would preclude treatment of an abnormality identified on CT, should not be screened due to lack of benefit.     To obtain maximal benefit from this screening, smoking cessation and long-term abstinence from smoking is critical

## 2020-09-30 ENCOUNTER — TELEPHONE (OUTPATIENT)
Dept: ONCOLOGY | Age: 75
End: 2020-09-30

## 2020-09-30 NOTE — LETTER
9/30/2020        [address] [address]    Dear Danielle Jeong: Your healthcare provider has ordered a low dose CT scan of the chest for lung cancer screening. You will find enclosed, information about CT lung screening. Please review the statement of understanding, you will be asked to sign a copy of this at the time of your CT scan    If you have not already been contacted to make the appointment for your scan, please call our scheduling department at [phone]    Keep in mind that CT lung screening does not take the place of smoking cessation. If you are a current smoker, you will find enclosed smoking cessation resources. Please do not hesitate to contact me if you have any questions or concerns.     [address],      [address] [facility] Scre[facility]  [phone]

## 2020-10-01 ENCOUNTER — HOSPITAL ENCOUNTER (OUTPATIENT)
Dept: CT IMAGING | Age: 75
Discharge: HOME | End: 2020-10-03
Payer: MEDICARE

## 2020-10-01 ENCOUNTER — HOSPITAL ENCOUNTER (OUTPATIENT)
Age: 75
Discharge: HOME | End: 2020-10-01
Payer: MEDICARE

## 2020-10-01 LAB
ANION GAP SERPL CALCULATED.3IONS-SCNC: 7 MMOL/L (ref 9–17)
BUN BLDV-MCNC: 21 MG/DL (ref 8–23)
BUN/CREAT BLD: ABNORMAL (ref 9–20)
CALCIUM SERPL-MCNC: 9.5 MG/DL (ref 8.6–10.4)
CHLORIDE BLD-SCNC: 106 MMOL/L (ref 98–107)
CO2: 26 MMOL/L (ref 20–31)
CREAT SERPL-MCNC: 1.37 MG/DL (ref 0.5–0.9)
GFR AFRICAN AMERICAN: 46 ML/MIN
GFR NON-AFRICAN AMERICAN: 38 ML/MIN
GFR SERPL CREATININE-BSD FRML MDRD: ABNORMAL ML/MIN/{1.73_M2}
GFR SERPL CREATININE-BSD FRML MDRD: ABNORMAL ML/MIN/{1.73_M2}
GLUCOSE FASTING: 100 MG/DL (ref 70–99)
HCT VFR BLD CALC: 32 % (ref 36–46)
HEMOGLOBIN: 10.8 G/DL (ref 12–16)
MCH RBC QN AUTO: 30.4 PG (ref 26–34)
MCHC RBC AUTO-ENTMCNC: 33.6 G/DL (ref 31–37)
MCV RBC AUTO: 90.5 FL (ref 80–100)
NRBC AUTOMATED: ABNORMAL PER 100 WBC
PDW BLD-RTO: 15.3 % (ref 11.5–14.9)
PLATELET # BLD: 255 K/UL (ref 150–450)
PMV BLD AUTO: 8.2 FL (ref 6–12)
POTASSIUM SERPL-SCNC: 4.7 MMOL/L (ref 3.7–5.3)
RBC # BLD: 3.54 M/UL (ref 4–5.2)
SODIUM BLD-SCNC: 139 MMOL/L (ref 135–144)
WBC # BLD: 5.4 K/UL (ref 3.5–11)

## 2020-10-01 PROCEDURE — 36415 COLL VENOUS BLD VENIPUNCTURE: CPT

## 2020-10-01 PROCEDURE — 80048 BASIC METABOLIC PNL TOTAL CA: CPT

## 2020-10-01 PROCEDURE — 85027 COMPLETE CBC AUTOMATED: CPT

## 2020-10-01 PROCEDURE — G0297 LDCT FOR LUNG CA SCREEN: HCPCS

## 2020-10-16 ENCOUNTER — HOSPITAL ENCOUNTER (OUTPATIENT)
Dept: PAIN MANAGEMENT | Age: 75
Discharge: HOME | End: 2020-10-16
Payer: MEDICARE

## 2020-10-16 PROCEDURE — 99213 OFFICE O/P EST LOW 20 MIN: CPT

## 2020-10-16 PROCEDURE — 99214 OFFICE O/P EST MOD 30 MIN: CPT | Performed by: NURSE PRACTITIONER

## 2020-10-16 RX ORDER — HYDROCODONE BITARTRATE AND ACETAMINOPHEN 5; 325 MG/1; MG/1
1 TABLET ORAL 2 TIMES DAILY PRN
Qty: 60 TABLET | Refills: 0 | Status: SHIPPED | OUTPATIENT
Start: 2020-10-24 | End: 2020-11-17 | Stop reason: SDUPTHER

## 2020-10-16 RX ORDER — GABAPENTIN 100 MG/1
100 CAPSULE ORAL 3 TIMES DAILY
Qty: 90 CAPSULE | Refills: 0 | Status: SHIPPED | OUTPATIENT
Start: 2020-10-16 | End: 2020-11-17 | Stop reason: SDUPTHER

## 2020-10-16 ASSESSMENT — ENCOUNTER SYMPTOMS
COUGH: 0
CONSTIPATION: 0
SHORTNESS OF BREATH: 0
BACK PAIN: 1

## 2020-10-16 NOTE — PROGRESS NOTES
Patient completed a video visit today to review medication contract. Chief Complaint: back pain    [facility]  Patient complains of back pain that radiates down her right leg. Lumbar XR shows Degenerative-type scoliosis of the lumbar spine with multilevel degenerative change. She has never had back surgery. She had PT without much benefit - radiating pain was exacerbated. She has had LESI in the past with mild relief lasting one month. She will be returning to [place] soon and plans to go to chiropractor and PT there. Back Pain   This is a chronic problem. The current episode started more than 1 year ago. The problem occurs constantly. The problem is unchanged. The pain is present in the lumbar spine. The quality of the pain is described as aching and shooting. Radiates to: down right leg to foot. The pain is at a severity of 4/10. The pain is moderate. The symptoms are aggravated by position, standing and lying down (walking). Pertinent negatives include no chest pain, fever, numbness or tingling. She has tried analgesics, bed rest, home exercises, chiropractic manipulation, heat and ice for the symptoms. The treatment provided mild relief. Patient denies any new neurological symptoms. No bowel or bladder incontinence, no weakness, and no falling. Pill count: appropriate due 10/25    Morphine equivalent: 10    Periodic Controlled Substance Monitoring: Possible medication side effects, risk of tolerance/dependence & alternative treatments discussed., No signs of potential drug abuse or diversion identified., Obtaining appropriate analgesic effect of treatment.  (Yesika Lee, APRN - CNP)      Past Medical History:   Diagnosis Date    Anxiety     Cancer [facility])     breast cancer, left     CKD (chronic kidney disease) stage 3, GFR 30-59 ml/min        Past Surgical History:   Procedure Laterality Date    APPENDECTOMY      BREAST LUMPECTOMY      right fatty tissue removed    BREAST REDUCTION SURGERY Right     BREAST SURGERY Left     left mastectomy w/reconstruction, tram    CATARACT REMOVAL Bilateral 03/03/2016    CHOLECYSTECTOMY      COLONOSCOPY      COLONOSCOPY  10/13/2016    Dr Candelaria Mccain TUBAL LIGATION         No Known Allergies      Current Outpatient Medications:     citalopram (CELEXA) 20 MG tablet, Take 1 tablet by mouth daily, Disp: 90 tablet, Rfl: 0    busPIRone (BUSPAR) 10 MG tablet, Take 1 tablet by mouth 3 times daily, Disp: 90 tablet, Rfl: 1    HYDROcodone-acetaminophen (NORCO) 5-325 MG per tablet, Take 1 tablet by mouth 2 times daily as needed for Pain for up to 30 days. , Disp: 60 tablet, Rfl: 0    buPROPion (WELLBUTRIN XL) 300 MG extended release tablet, TAKE 1 TABLET BY MOUTH EVERY MORNING, Disp: 90 tablet, Rfl: 1    Acetaminophen (TYLENOL 8 HOUR ARTHRITIS PAIN PO), Take by mouth, Disp: , Rfl:     calcitonin (MIACALCIN) 200 UNIT/ACT nasal spray, 1 spray by Nasal route daily, Disp: 11.1 mL, Rfl: 10    Multiple Vitamins-Minerals (MULTIVITAMIN ADULT PO), Take by mouth, Disp: , Rfl:     Calcium Carb-Cholecalciferol (CALCIUM + D3) 600-200 MG-UNIT TABS tablet, Take 1 tablet by mouth Daily with lunch, Disp: , Rfl:     Family History   Problem Relation Age of Onset    Heart Disease Father        Social History     Socioeconomic History    Marital status:      Spouse name: Not on file    Number of children: 2    Years of education: 12    Highest education level: Not on file   Occupational History    Occupation: retired   Social Needs    Financial resource strain: Not on file    Food insecurity     Worry: Not on file     Inability: Not on file   [employer] needs     Medical: Not on file     Non-medical: Not on file   Tobacco Use    Smoking status: Current Every Day Smoker     Packs/day: 1.00     Years: 32.00     Pack years: 32.00    Smokeless tobacco: Never Used    Tobacco comment: 1 pack a day   Substance and Sexual Activity    Alcohol use: Yes     Comment: stenosis, lumbar region, without neurogenic claudication (Chronic)    Relevant Medications    HYDROcodone-acetaminophen (NORCO) 5-325 MG per tablet (Start on 10/24/2020)    Medication monitoring encounter - Primary    Lumbar radiculopathy, chronic    Relevant Medications    HYDROcodone-acetaminophen (NORCO) 5-325 MG per tablet (Start on 10/24/2020)    gabapentin (NEURONTIN) 100 MG capsule             Treatment Plan:  Patient relates current medications are helping the pain. Patient reports taking pain medications as prescribed, denies obtaining medications from different sources and denies use of illegal drugs. Patient denies side effects from medications like nausea, vomiting, constipation or drowsiness. Patient reports current activities of daily living are possible due to medications and would like to continue them. As always, we encourage daily stretching and strengthening exercises, and recommend minimizing use of pain medications unless patient cannot get through daily activities due to pain. Contract requirements met. Continue opioid therapy. Script written for norco  Start gabapentin 100 mg to titrate up to TID  Follow up appointment made for 4 weeks    Marija Williamson is a 77 y.o. female being evaluated by a Virtual Visit (video visit) encounter to address concerns as mentioned above. A caregiver was present when appropriate. Due to this being a TeleHealth encounter (During DJADH-79 public health emergency), evaluation of the following organ systems was limited: Vitals/Constitutional/EENT/Resp/CV/GI//MS/Neuro/Skin/Heme-Lymph-Imm. Pursuant to the emergency declaration under the [facility], [address] [place] [address] and the [employer] and [employer] Act, this Virtual Visit was conducted with patient's (and/or legal guardian's) consent, to reduce the patient's risk of exposure to COVID-19 and provide necessary medical care. The patient (and/or legal guardian) has also been advised to contact this office for worsening conditions or problems, and seek emergency medical treatment and/or call 911 if deemed necessary. Patient identification was verified at the start of the visit: Yes    Total time spent for this encounter: Not billed by time    Services were provided through a video synchronous discussion virtually to substitute for in-person clinic visit. Patient and provider were located at their individual homes. --CHI Venegas CNP on 10/16/2020 at 10:37 AM    An electronic signature was used to authenticate this note.

## 2020-10-21 ENCOUNTER — OFFICE VISIT (OUTPATIENT)
Dept: PRIMARY CARE CLINIC | Age: 75
End: 2020-10-21
Payer: MEDICARE

## 2020-10-21 VITALS
HEART RATE: 78 BPM | TEMPERATURE: 97.2 F | DIASTOLIC BLOOD PRESSURE: 60 MMHG | SYSTOLIC BLOOD PRESSURE: 128 MMHG | WEIGHT: 115 LBS | OXYGEN SATURATION: 98 % | BODY MASS INDEX: 22.46 KG/M2

## 2020-10-21 PROCEDURE — 1123F ACP DISCUSS/DSCN MKR DOCD: CPT | Performed by: FAMILY MEDICINE

## 2020-10-21 PROCEDURE — G8484 FLU IMMUNIZE NO ADMIN: HCPCS | Performed by: FAMILY MEDICINE

## 2020-10-21 PROCEDURE — 1090F PRES/ABSN URINE INCON ASSESS: CPT | Performed by: FAMILY MEDICINE

## 2020-10-21 PROCEDURE — 99212 OFFICE O/P EST SF 10 MIN: CPT | Performed by: FAMILY MEDICINE

## 2020-10-21 PROCEDURE — G8399 PT W/DXA RESULTS DOCUMENT: HCPCS | Performed by: FAMILY MEDICINE

## 2020-10-21 PROCEDURE — G8427 DOCREV CUR MEDS BY ELIG CLIN: HCPCS | Performed by: FAMILY MEDICINE

## 2020-10-21 PROCEDURE — G8420 CALC BMI NORM PARAMETERS: HCPCS | Performed by: FAMILY MEDICINE

## 2020-10-21 PROCEDURE — 4004F PT TOBACCO SCREEN RCVD TLK: CPT | Performed by: FAMILY MEDICINE

## 2020-10-21 PROCEDURE — 3017F COLORECTAL CA SCREEN DOC REV: CPT | Performed by: FAMILY MEDICINE

## 2020-10-21 PROCEDURE — 4040F PNEUMOC VAC/ADMIN/RCVD: CPT | Performed by: FAMILY MEDICINE

## 2020-10-21 RX ORDER — ASPIRIN 81 MG/1
81 TABLET, CHEWABLE ORAL DAILY
Qty: 30 TABLET | Refills: 3 | COMMUNITY
Start: 2020-10-21

## 2020-10-21 RX ORDER — ATORVASTATIN CALCIUM 40 MG/1
40 TABLET, FILM COATED ORAL DAILY
Qty: 30 TABLET | Refills: 5 | Status: SHIPPED | OUTPATIENT
Start: 2020-10-21 | End: 2020-10-22

## 2020-10-21 NOTE — PROGRESS NOTES
Kurt Carballo a 77 y.o. female who presents today for her medical conditions/complaints as notedbelow. Chief Complaint   Patient presents with    Anxiety     1mth f/u         HPI:   HPI  Has been taking buspar for the anxiety and irritablitiy and that is helping  No wooziness or feeling tired with meds, even with adding gabapentin and hydrocodone. Has been seeing PM and started on gabapentin : no SE    She has a lot going on with helping care of SO who has MD and is disabled. Reviewed CT and labs : discussed being on baby asa and starting statin med. LDL Calculated (mg/dL)   Date Value   06/13/2017 96       (goal LDL is <100)   BUN (mg/dL)   Date Value   10/01/2020 21     BP Readings from Last 3 Encounters:   10/21/20 128/60   09/22/20 120/60   04/22/20 (!) 144/76          (goal 120/80)    Past Medical History:   Diagnosis Date    Anxiety     Cancer ([facility] [address]     breast cancer, left     CKD (chronic kidney disease) stage 3, GFR 30-59 ml/min       Past Surgical History:   Procedure Laterality Date    APPENDECTOMY      BREAST LUMPECTOMY      right fatty tissue removed    BREAST REDUCTION SURGERY Right     BREAST SURGERY Left     left mastectomy w/reconstruction, tram    CATARACT REMOVAL Bilateral 03/03/2016    CHOLECYSTECTOMY      COLONOSCOPY      COLONOSCOPY  10/13/2016    Dr Olmedo Gum         Family History   Problem Relation Age of Onset    Heart Disease Father        Social History     Tobacco Use    Smoking status: Current Every Day Smoker     Packs/day: 1.00     Years: 32.00     Pack years: 32.00    Smokeless tobacco: Never Used    Tobacco comment: 1 pack a day   Substance Use Topics    Alcohol use: Yes     Comment: occ. wine or rum.       Current Outpatient Medications   Medication Sig Dispense Refill    aspirin (ASPIRIN CHILDRENS) 81 MG chewable tablet Take 1 tablet by mouth daily 30 tablet 3    atorvastatin (LIPITOR) 40 MG tablet Take 1 tablet by mouth daily 30 tablet 5    [START ON 10/24/2020] HYDROcodone-acetaminophen (NORCO) 5-325 MG per tablet Take 1 tablet by mouth 2 times daily as needed for Pain for up to 30 days. 60 tablet 0    gabapentin (NEURONTIN) 100 MG capsule Take 1 capsule by mouth 3 times daily for 30 days. 1 cap at bed for 3 days, then 1 in the AM and HS for 3 days, then 1 cap 3 times a day. 90 capsule 0    citalopram (CELEXA) 20 MG tablet Take 1 tablet by mouth daily 90 tablet 0    busPIRone (BUSPAR) 10 MG tablet Take 1 tablet by mouth 3 times daily 90 tablet 1    buPROPion (WELLBUTRIN XL) 300 MG extended release tablet TAKE 1 TABLET BY MOUTH EVERY MORNING 90 tablet 1    Acetaminophen (TYLENOL 8 HOUR ARTHRITIS PAIN PO) Take by mouth      calcitonin (MIACALCIN) 200 UNIT/ACT nasal spray 1 spray by Nasal route daily 11.1 mL 10    Multiple Vitamins-Minerals (MULTIVITAMIN ADULT PO) Take by mouth      Calcium Carb-Cholecalciferol (CALCIUM + D3) 600-200 MG-UNIT TABS tablet Take 1 tablet by mouth Daily with lunch       No current facility-administered medications for this visit. No Known Allergies    Health Maintenance   Topic Date Due    Annual Wellness Visit (AWV)  06/20/2019    Potassium monitoring  10/01/2021    Creatinine monitoring  10/01/2021    Low dose CT lung screening  10/01/2021    Breast cancer screen  10/21/2021    Lipid screen  06/13/2022    DTaP/Tdap/Td vaccine (2 - Td) 10/01/2025    Colon cancer screen colonoscopy  10/13/2026    DEXA (modify frequency per FRAX score)  Completed    Flu vaccine  Completed    Shingles Vaccine  Completed    Pneumococcal 65+ years Vaccine  Completed    Hepatitis C screen  Completed    Hepatitis A vaccine  Aged Out    Hepatitis B vaccine  Aged Out    Hib vaccine  Aged Out    Meningococcal (ACWY) vaccine  Aged Out       Subjective:      Review of Systems   Constitutional: Negative.         Objective:     /60   Pulse 78   Temp 97.2 °F (36.2 °C)   Wt 115 lb (52.2 kg)   SpO2

## 2020-10-22 ENCOUNTER — HOSPITAL ENCOUNTER (OUTPATIENT)
Dept: MAMMOGRAPHY | Age: 75
Discharge: HOME | End: 2020-10-24
Payer: MEDICARE

## 2020-10-22 PROCEDURE — 77063 BREAST TOMOSYNTHESIS BI: CPT

## 2020-11-17 ENCOUNTER — HOSPITAL ENCOUNTER (OUTPATIENT)
Dept: PAIN MANAGEMENT | Age: 75
Discharge: HOME | End: 2020-11-17
Payer: MEDICARE

## 2020-11-17 PROCEDURE — 99213 OFFICE O/P EST LOW 20 MIN: CPT | Performed by: NURSE PRACTITIONER

## 2020-11-17 PROCEDURE — 99213 OFFICE O/P EST LOW 20 MIN: CPT

## 2020-11-17 RX ORDER — HYDROCODONE BITARTRATE AND ACETAMINOPHEN 5; 325 MG/1; MG/1
1 TABLET ORAL 2 TIMES DAILY PRN
Qty: 60 TABLET | Refills: 0 | Status: SHIPPED | OUTPATIENT
Start: 2020-11-17 | End: 2020-12-17

## 2020-11-17 RX ORDER — GABAPENTIN 100 MG/1
100 CAPSULE ORAL 3 TIMES DAILY
Qty: 90 CAPSULE | Refills: 0 | Status: SHIPPED | OUTPATIENT
Start: 2020-11-17 | End: 2021-04-22

## 2020-11-17 ASSESSMENT — ENCOUNTER SYMPTOMS
SHORTNESS OF BREATH: 0
COUGH: 0
BACK PAIN: 1
CONSTIPATION: 0

## 2020-11-17 NOTE — PROGRESS NOTES
Patient competed a video visit today to review medication contract. Chief Complaint: back pain    [facility]  Patient complains of back pain that radiates down her right leg. Lumbar XR shows Degenerative-type scoliosis of the lumbar spine with multilevel degenerative change. She has never had back surgery. She had PT without much benefit - radiating pain was exacerbated. She has had LESI in the past with mild relief lasting one month. She will be returning to [place] soon and plans to go to chiropractor and PT there. Gabapentin 100 mg TID started last month with moderate relief. Pt denies side effects. Back Pain   This is a chronic problem. The current episode started more than 1 year ago. The problem occurs constantly. The problem is unchanged. The pain is present in the lumbar spine. The quality of the pain is described as aching. The pain does not radiate. The pain is at a severity of 3/10. The pain is mild. The symptoms are aggravated by position and standing (walking). Pertinent negatives include no chest pain, fever, numbness or tingling. She has tried analgesics, bed rest and ice for the symptoms. The treatment provided mild relief. Patient denies any new neurological symptoms. No bowel or bladder incontinence, no weakness, and no falling. Pill count: appropriate due 11/20    Morphine equivalent: 10    Periodic Controlled Substance Monitoring: Possible medication side effects, risk of tolerance/dependence & alternative treatments discussed., No signs of potential drug abuse or diversion identified., Obtaining appropriate analgesic effect of treatment.  (Sumaya Miranda, APRN - CNP)      Past Medical History:   Diagnosis Date    Anxiety     Cancer [facility])     breast cancer, left     CKD (chronic kidney disease) stage 3, GFR 30-59 ml/min        Past Surgical History:   Procedure Laterality Date    APPENDECTOMY      BREAST LUMPECTOMY      right fatty tissue removed    BREAST REDUCTION SURGERY Right     BREAST SURGERY Left     left mastectomy w/reconstruction, tram    CATARACT REMOVAL Bilateral 03/03/2016    CHOLECYSTECTOMY      COLONOSCOPY      COLONOSCOPY  10/13/2016    Dr Efren Asher TUBAL LIGATION         No Known Allergies      Current Outpatient Medications:     atorvastatin (LIPITOR) 40 MG tablet, TAKE 1 TABLET BY MOUTH DAILY, Disp: 90 tablet, Rfl: 1    aspirin (ASPIRIN CHILDRENS) 81 MG chewable tablet, Take 1 tablet by mouth daily, Disp: 30 tablet, Rfl: 3    HYDROcodone-acetaminophen (NORCO) 5-325 MG per tablet, Take 1 tablet by mouth 2 times daily as needed for Pain for up to 30 days. , Disp: 60 tablet, Rfl: 0    gabapentin (NEURONTIN) 100 MG capsule, Take 1 capsule by mouth 3 times daily for 30 days.  1 cap at bed for 3 days, then 1 in the AM and HS for 3 days, then 1 cap 3 times a day., Disp: 90 capsule, Rfl: 0    citalopram (CELEXA) 20 MG tablet, Take 1 tablet by mouth daily, Disp: 90 tablet, Rfl: 0    buPROPion (WELLBUTRIN XL) 300 MG extended release tablet, TAKE 1 TABLET BY MOUTH EVERY MORNING, Disp: 90 tablet, Rfl: 1    Acetaminophen (TYLENOL 8 HOUR ARTHRITIS PAIN PO), Take by mouth, Disp: , Rfl:     calcitonin (MIACALCIN) 200 UNIT/ACT nasal spray, 1 spray by Nasal route daily, Disp: 11.1 mL, Rfl: 10    Multiple Vitamins-Minerals (MULTIVITAMIN ADULT PO), Take by mouth, Disp: , Rfl:     Calcium Carb-Cholecalciferol (CALCIUM + D3) 600-200 MG-UNIT TABS tablet, Take 1 tablet by mouth Daily with lunch, Disp: , Rfl:     Family History   Problem Relation Age of Onset    Heart Disease Father        Social History     Socioeconomic History    Marital status:      Spouse name: Not on file    Number of children: 2    Years of education: 12    Highest education level: Not on file   Occupational History    Occupation: retired   Social Needs    Financial resource strain: Not on file    Food insecurity     Worry: Not on file     Inability: Not on file   [email or web address] needs     Medical: Not on file     Non-medical: Not on file   Tobacco Use    Smoking status: Current Every Day Smoker     Packs/day: 1.00     Years: 32.00     Pack years: 32.00    Smokeless tobacco: Never Used    Tobacco comment: 1 pack a day   Substance and Sexual Activity    Alcohol use: Yes     Comment: occ. wine or rum.  Drug use: No    Sexual activity: Not on file   Lifestyle    Physical activity     Days per week: Not on file     Minutes per session: Not on file    Stress: Not on file   Relationships    Social connections     Talks on phone: Not on file     Gets together: Not on file     Attends Evangelical service: Not on file     Active member of club or organization: Not on file     Attends meetings of clubs or organizations: Not on file     Relationship status: Not on file    Intimate partner violence     Fear of current or ex partner: Not on file     Emotionally abused: Not on file     Physically abused: Not on file     Forced sexual activity: Not on file   Other Topics Concern    Not on file   Social History Narrative    Not on file       Review of Systems:  Review of Systems   Constitution: Negative for chills and fever. Cardiovascular: Negative for chest pain and palpitations. Respiratory: Negative for cough and shortness of breath. Musculoskeletal: Positive for back pain. Gastrointestinal: Negative for constipation. Neurological: Negative for disturbances in coordination, loss of balance, numbness and tingling. Physical Exam:  There were no vitals taken for this visit. Physical Exam  HENT:      Head: Normocephalic. Pulmonary:      Effort: Pulmonary effort is normal.   Neurological:      Mental Status: She is alert.    Psychiatric:         Mood and Affect: Mood normal.         Behavior: Behavior normal.         Record/Diagnostics Review:    Last sharee 9/2020 and was appropriate     Assessment:  Problem List Items Addressed This Visit     Degeneration of lumbar or lumbosacral intervertebral disc (Chronic)    Lumbosacral spondylosis without myelopathy (Chronic)    Spinal stenosis, lumbar region, without neurogenic claudication (Chronic)    Medication monitoring encounter - Primary    Lumbar radiculopathy, chronic             Treatment Plan:  Patient relates current medications are helping the pain. Patient reports taking pain medications as prescribed, denies obtaining medications from different sources and denies use of illegal drugs. Patient denies side effects from medications like nausea, vomiting, constipation or drowsiness. Patient reports current activities of daily living are possible due to medications and would like to continue them. As always, we encourage daily stretching and strengthening exercises, and recommend minimizing use of pain medications unless patient cannot get through daily activities due to pain. Contract requirements met. Continue opioid therapy. Script written for norco   Continue PT  Continue gabapentin  Follow up appointment made for 4 weeks    Bess Mueller is a 77 y.o. female being evaluated by a Virtual Visit (video visit) encounter to address concerns as mentioned above. A caregiver was present when appropriate. Due to this being a TeleHealth encounter (During [facility] public health emergency), evaluation of the following organ systems was limited: Vitals/Constitutional/EENT/Resp/CV/GI//MS/Neuro/Skin/Heme-Lymph-Imm. Pursuant to the emergency declaration under the [facility], [address] [place] [address] authority and the [employer] Act, this Virtual Visit was conducted with patient's (and/or legal guardian's) consent, to reduce the patient's risk of exposure to COVID-19 and provide necessary medical care.   The patient (and/or legal guardian) has also been advised to contact this office for worsening conditions or problems, and seek emergency medical treatment and/or call 911 if deemed necessary. Patient identification was verified at the start of the visit: Yes    Total time spent for this encounter: Not billed by time    Services were provided through a video synchronous discussion virtually to substitute for in-person clinic visit. Patient and provider were located at their individual homes. --CHI Alan CNP on 11/17/2020 at 1:46 PM    An electronic signature was used to authenticate this note.

## 2020-11-18 ENCOUNTER — APPOINTMENT (RX ONLY)
Dept: URBAN - METROPOLITAN AREA CLINIC 148 | Facility: CLINIC | Age: 75
Setting detail: DERMATOLOGY
End: 2020-11-18

## 2020-11-18 VITALS — TEMPERATURE: 96.3 F

## 2020-11-18 DIAGNOSIS — D69.2 OTHER NONTHROMBOCYTOPENIC PURPURA: ICD-10-CM

## 2020-11-18 DIAGNOSIS — D22 MELANOCYTIC NEVI: ICD-10-CM

## 2020-11-18 DIAGNOSIS — Z87.2 PERSONAL HISTORY OF DISEASES OF THE SKIN AND SUBCUTANEOUS TISSUE: ICD-10-CM

## 2020-11-18 DIAGNOSIS — L82.1 OTHER SEBORRHEIC KERATOSIS: ICD-10-CM

## 2020-11-18 DIAGNOSIS — D18.0 HEMANGIOMA: ICD-10-CM

## 2020-11-18 DIAGNOSIS — L81.4 OTHER MELANIN HYPERPIGMENTATION: ICD-10-CM

## 2020-11-18 PROBLEM — D22.5 MELANOCYTIC NEVI OF TRUNK: Status: ACTIVE | Noted: 2020-11-18

## 2020-11-18 PROBLEM — D18.01 HEMANGIOMA OF SKIN AND SUBCUTANEOUS TISSUE: Status: ACTIVE | Noted: 2020-11-18

## 2020-11-18 PROCEDURE — ? ADDITIONAL NOTES

## 2020-11-18 PROCEDURE — ? COUNSELING

## 2020-11-18 PROCEDURE — 99213 OFFICE O/P EST LOW 20 MIN: CPT

## 2020-11-18 PROCEDURE — ? FULL BODY SKIN EXAM

## 2020-11-18 ASSESSMENT — LOCATION DETAILED DESCRIPTION DERM
LOCATION DETAILED: RIGHT SUPERIOR MEDIAL UPPER BACK
LOCATION DETAILED: RIGHT PROXIMAL DORSAL FOREARM
LOCATION DETAILED: LEFT PROXIMAL POSTERIOR UPPER ARM
LOCATION DETAILED: LEFT ANTERIOR DISTAL THIGH
LOCATION DETAILED: RIGHT SUPERIOR MEDIAL MIDBACK
LOCATION DETAILED: RIGHT ANTERIOR DISTAL THIGH
LOCATION DETAILED: RIGHT DISTAL POSTERIOR UPPER ARM
LOCATION DETAILED: LEFT PROXIMAL DORSAL FOREARM
LOCATION DETAILED: LEFT INFERIOR CENTRAL MALAR CHEEK
LOCATION DETAILED: LEFT MEDIAL SUPERIOR CHEST
LOCATION DETAILED: RIGHT INFERIOR MEDIAL MIDBACK

## 2020-11-18 ASSESSMENT — LOCATION SIMPLE DESCRIPTION DERM
LOCATION SIMPLE: RIGHT LOWER BACK
LOCATION SIMPLE: RIGHT FOREARM
LOCATION SIMPLE: RIGHT POSTERIOR UPPER ARM
LOCATION SIMPLE: RIGHT THIGH
LOCATION SIMPLE: LEFT THIGH
LOCATION SIMPLE: LEFT CHEEK
LOCATION SIMPLE: LEFT POSTERIOR UPPER ARM
LOCATION SIMPLE: RIGHT UPPER BACK
LOCATION SIMPLE: LEFT FOREARM
LOCATION SIMPLE: CHEST

## 2020-11-18 ASSESSMENT — LOCATION ZONE DERM
LOCATION ZONE: LEG
LOCATION ZONE: FACE
LOCATION ZONE: ARM
LOCATION ZONE: TRUNK

## 2021-01-06 DIAGNOSIS — F32.A DEPRESSIVE DISORDER: ICD-10-CM

## 2021-01-06 DIAGNOSIS — G47.00 INSOMNIA, UNSPECIFIED TYPE: ICD-10-CM

## 2021-01-06 RX ORDER — CITALOPRAM 20 MG/1
20 TABLET ORAL DAILY
Qty: 90 TABLET | Refills: 0 | Status: SHIPPED | OUTPATIENT
Start: 2021-01-06

## 2021-01-07 DIAGNOSIS — Z51.81 MEDICATION MONITORING ENCOUNTER: ICD-10-CM

## 2021-01-07 RX ORDER — BUPROPION HYDROCHLORIDE 300 MG/1
300 TABLET ORAL EVERY MORNING
Qty: 90 TABLET | Refills: 1 | Status: SHIPPED | OUTPATIENT
Start: 2021-01-07 | End: 2021-04-08 | Stop reason: SDUPTHER

## 2021-04-08 DIAGNOSIS — Z51.81 MEDICATION MONITORING ENCOUNTER: ICD-10-CM

## 2021-04-08 RX ORDER — BUPROPION HYDROCHLORIDE 300 MG/1
300 TABLET ORAL EVERY MORNING
Qty: 90 TABLET | Refills: 1 | Status: SHIPPED | OUTPATIENT
Start: 2021-04-08 | End: 2021-09-30

## 2021-04-22 ENCOUNTER — OFFICE VISIT (OUTPATIENT)
Dept: PRIMARY CARE CLINIC | Age: 76
End: 2021-04-22
Payer: MEDICARE

## 2021-04-22 ENCOUNTER — HOSPITAL ENCOUNTER (OUTPATIENT)
Age: 76
Setting detail: SPECIMEN
Discharge: HOME | End: 2021-04-22
Payer: MEDICARE

## 2021-04-22 VITALS
HEART RATE: 87 BPM | WEIGHT: 111 LBS | OXYGEN SATURATION: 96 % | BODY MASS INDEX: 21.79 KG/M2 | HEIGHT: 60 IN | DIASTOLIC BLOOD PRESSURE: 60 MMHG | SYSTOLIC BLOOD PRESSURE: 130 MMHG | TEMPERATURE: 98.4 F

## 2021-04-22 DIAGNOSIS — F33.41 RECURRENT MAJOR DEPRESSIVE DISORDER, IN PARTIAL REMISSION (HCC): ICD-10-CM

## 2021-04-22 DIAGNOSIS — M81.0 MENOPAUSAL OSTEOPOROSIS: ICD-10-CM

## 2021-04-22 DIAGNOSIS — N18.30 STAGE 3 CHRONIC KIDNEY DISEASE, UNSPECIFIED WHETHER STAGE 3A OR 3B CKD (HCC): ICD-10-CM

## 2021-04-22 DIAGNOSIS — F41.9 ANXIETY: ICD-10-CM

## 2021-04-22 DIAGNOSIS — R63.4 WEIGHT LOSS: ICD-10-CM

## 2021-04-22 DIAGNOSIS — M54.16 LUMBAR RADICULOPATHY, CHRONIC: ICD-10-CM

## 2021-04-22 DIAGNOSIS — Z51.81 MEDICATION MONITORING ENCOUNTER: Primary | ICD-10-CM

## 2021-04-22 DIAGNOSIS — I70.90 ATHEROSCLEROSIS: Chronic | ICD-10-CM

## 2021-04-22 LAB — TSH SERPL DL<=0.05 MIU/L-ACNC: 1.68 MIU/L (ref 0.3–5)

## 2021-04-22 PROCEDURE — 4004F PT TOBACCO SCREEN RCVD TLK: CPT | Performed by: FAMILY MEDICINE

## 2021-04-22 PROCEDURE — G8427 DOCREV CUR MEDS BY ELIG CLIN: HCPCS | Performed by: FAMILY MEDICINE

## 2021-04-22 PROCEDURE — G8420 CALC BMI NORM PARAMETERS: HCPCS | Performed by: FAMILY MEDICINE

## 2021-04-22 PROCEDURE — 1123F ACP DISCUSS/DSCN MKR DOCD: CPT | Performed by: FAMILY MEDICINE

## 2021-04-22 PROCEDURE — 4040F PNEUMOC VAC/ADMIN/RCVD: CPT | Performed by: FAMILY MEDICINE

## 2021-04-22 PROCEDURE — G8399 PT W/DXA RESULTS DOCUMENT: HCPCS | Performed by: FAMILY MEDICINE

## 2021-04-22 PROCEDURE — 1090F PRES/ABSN URINE INCON ASSESS: CPT | Performed by: FAMILY MEDICINE

## 2021-04-22 PROCEDURE — 99214 OFFICE O/P EST MOD 30 MIN: CPT | Performed by: FAMILY MEDICINE

## 2021-04-22 PROCEDURE — 3017F COLORECTAL CA SCREEN DOC REV: CPT | Performed by: FAMILY MEDICINE

## 2021-04-22 RX ORDER — BUSPIRONE HYDROCHLORIDE 10 MG/1
10 TABLET ORAL 3 TIMES DAILY PRN
Qty: 90 TABLET | Refills: 1 | Status: SHIPPED | OUTPATIENT
Start: 2021-04-22 | End: 2021-10-28 | Stop reason: SDUPTHER

## 2021-04-22 RX ORDER — ATORVASTATIN CALCIUM 40 MG/1
40 TABLET, FILM COATED ORAL DAILY
Qty: 90 TABLET | Refills: 3 | Status: SHIPPED | OUTPATIENT
Start: 2021-04-22 | End: 2022-05-26

## 2021-04-22 RX ORDER — CALCITONIN SALMON 200 [IU]/.09ML
1 SPRAY, METERED NASAL DAILY
Qty: 3 BOTTLE | Refills: 3 | Status: SHIPPED | OUTPATIENT
Start: 2021-04-22 | End: 2022-05-25

## 2021-04-22 RX ORDER — HYDROCODONE BITARTRATE AND ACETAMINOPHEN 5; 325 MG/1; MG/1
1 TABLET ORAL EVERY 6 HOURS PRN
COMMUNITY
Start: 2021-01-26 | End: 2021-06-15 | Stop reason: SDUPTHER

## 2021-04-22 ASSESSMENT — PATIENT HEALTH QUESTIONNAIRE - PHQ9
2. FEELING DOWN, DEPRESSED OR HOPELESS: 0
1. LITTLE INTEREST OR PLEASURE IN DOING THINGS: 0
SUM OF ALL RESPONSES TO PHQ QUESTIONS 1-9: 0
SUM OF ALL RESPONSES TO PHQ9 QUESTIONS 1 & 2: 0
SUM OF ALL RESPONSES TO PHQ QUESTIONS 1-9: 0

## 2021-04-22 NOTE — PROGRESS NOTES
[facility]  [address] [place] [id number]  Dept: [phone]    Herminio Concepcion is a 77 y.o. female Established patient, who presents today for her medical conditions/complaintsas noted below. Chief Complaint   Patient presents with    Anxiety     6mth        HPI:     HPI    Reviewed prior notes None  Reviewed previous Labs and Imaging  Has been on pain meds in the past from PM      LDL Calculated (mg/dL)   Date Value   06/13/2017 96       (goal LDL is <100)   BUN (mg/dL)   Date Value   10/01/2020 21     TSH (mIU/L)   Date Value   07/12/2019 3.15     BP Readings from Last 3 Encounters:   04/22/21 130/60   10/21/20 128/60   09/22/20 120/60          (goal 120/80)    Past Medical History:   Diagnosis Date    Anxiety     Cancer ([facility] [address]     breast cancer, left     CKD (chronic kidney disease) stage 3, GFR 30-59 ml/min       Past Surgical History:   Procedure Laterality Date    APPENDECTOMY      BREAST LUMPECTOMY      right fatty tissue removed    BREAST REDUCTION SURGERY Right     BREAST SURGERY Left     left mastectomy w/reconstruction, tram    CATARACT REMOVAL Bilateral 03/03/2016    CHOLECYSTECTOMY      COLONOSCOPY      COLONOSCOPY  10/13/2016    Dr Nancy Wu         Family History   Problem Relation Age of Onset    Heart Disease Father        Social History     Tobacco Use    Smoking status: Current Every Day Smoker     Packs/day: 1.00     Years: 32.00     Pack years: 32.00    Smokeless tobacco: Never Used    Tobacco comment: 1 pack a day   Substance Use Topics    Alcohol use: Yes     Comment: occ. wine or rum. Current Outpatient Medications   Medication Sig Dispense Refill    HYDROcodone-acetaminophen (NORCO) 5-325 MG per tablet Take 1 tablet by mouth every 6 hours as needed.       calcitonin (MIACALCIN) 200 UNIT/ACT nasal spray 1 spray by Nasal route daily 3 Bottle 3    atorvastatin (LIPITOR) 40 MG tablet Take 1 tablet by mouth daily 90 tablet 3    busPIRone (BUSPAR) 10 MG tablet Take 1 tablet by mouth 3 times daily as needed (increased anxiety) 90 tablet 1    Handicap Placard MISC by Does not apply route Cannot walk 200 feet due to orthopedic/back pain  Expires in 5 years 26 1 each 0    buPROPion (WELLBUTRIN XL) 300 MG extended release tablet Take 1 tablet by mouth every morning 90 tablet 1    citalopram (CELEXA) 20 MG tablet Take 1 tablet by mouth daily 90 tablet 0    aspirin (ASPIRIN CHILDRENS) 81 MG chewable tablet Take 1 tablet by mouth daily 30 tablet 3    Acetaminophen (TYLENOL 8 HOUR ARTHRITIS PAIN PO) Take by mouth      Multiple Vitamins-Minerals (MULTIVITAMIN ADULT PO) Take by mouth      Calcium Carb-Cholecalciferol (CALCIUM + D3) 600-200 MG-UNIT TABS tablet Take 1 tablet by mouth Daily with lunch       No current facility-administered medications for this visit. No Known Allergies    Health Maintenance   Topic Date Due    COVID-19 Vaccine (1) Never done    Lipid screen  2018    Annual Wellness Visit (AWV)  Never done    Potassium monitoring  10/01/2021    Creatinine monitoring  10/01/2021    Low dose CT lung screening  10/01/2021    DTaP/Tdap/Td vaccine (2 - Td) 10/01/2025    Colon cancer screen colonoscopy  10/13/2026    DEXA (modify frequency per FRAX score)  Completed    Flu vaccine  Completed    Shingles Vaccine  Completed    Pneumococcal 65+ years Vaccine  Completed    Hepatitis C screen  Completed    Hepatitis A vaccine  Aged Out    Hepatitis B vaccine  Aged Out    Hib vaccine  Aged Out    Meningococcal (ACWY) vaccine  Aged Out       Subjective:      Review of Systems   Constitutional: Negative. anxiety and depression  Bad winter in [facility], her SO was ill and had appy, her mother  this winter at 80 y.o. Her SO is confined to wheelchair, has MD and bladder CA    Tried PM in [facility] and had injections in back : helped her joints but not her back.   She didn't try buspar that she recalls but would like to try it  Doesn't take celexa daily     Objective:     /60   Pulse 87   Temp 98.4 °F (36.9 °C)   Ht 5' (1.524 m)   Wt 111 lb (50.3 kg)   SpO2 96%   BMI 21.68 kg/m²   Physical Exam  Vitals signs and nursing note reviewed. Constitutional:       General: She is not in acute distress. Appearance: She is well-developed. She is not ill-appearing. HENT:      Head: Normocephalic and atraumatic. Right Ear: External ear normal.      Left Ear: External ear normal.   Eyes:      General: No scleral icterus. Right eye: No discharge. Left eye: No discharge. Conjunctiva/sclera: Conjunctivae normal.      Pupils: Pupils are equal, round, and reactive to light. Neck:      Thyroid: No thyromegaly. Trachea: No tracheal deviation. Cardiovascular:      Rate and Rhythm: Normal rate and regular rhythm. Heart sounds: Normal heart sounds. Pulmonary:      Effort: Pulmonary effort is normal. No respiratory distress. Breath sounds: Normal breath sounds. No wheezing. Musculoskeletal:      Right lower leg: No edema. Left lower leg: No edema. Lymphadenopathy:      Cervical: No cervical adenopathy. Skin:     General: Skin is warm. Findings: Bruising present. No rash. Comments: Ecchymoses lower legs   Neurological:      Mental Status: She is alert and oriented to person, place, and time. Psychiatric:         Mood and Affect: Mood normal.         Behavior: Behavior normal.         Thought Content: Thought content normal.         Assessment:       Diagnosis Orders   1. Medication monitoring encounter  busPIRone (BUSPAR) 10 MG tablet    Lipid Panel    ALT    AST   2. Recurrent major depressive disorder, in partial remission ([facility] [address]     3. Lumbar radiculopathy, chronic  Handicap Placard MISC   4. Stage 3 chronic kidney disease, unspecified whether stage 3a or 3b CKD     5.  Menopausal osteoporosis  calcitonin (MIACALCIN) 200 UNIT/ACT nasal spray   6. Atherosclerosis  atorvastatin (LIPITOR) 40 MG tablet    Lipid Panel    ALT    AST    seen on CT scan chest   7. Anxiety  busPIRone (BUSPAR) 10 MG tablet   8. Weight loss  TSH With Reflex Ft4        Plan:      Return in about 3 months (around 2021) for med check and weight check. .  Take celexa daily  Use buspar as needed. Check weight in a month: call if checks at home. It could be due to stress. If still loosing weight then check CT abd and pelvis  Orders Placed This Encounter   Procedures    TSH With Reflex Ft4     Standing Status:   Future     Standing Expiration Date:   2022    Lipid Panel     Standing Status:   Future     Standing Expiration Date:   2022     Order Specific Question:   Is Patient Fasting?/# of Hours     Answer:   yes    ALT     Standing Status:   Future     Standing Expiration Date:   2022    AST     Standing Status:   Future     Standing Expiration Date:   2022     Orders Placed This Encounter   Medications    calcitonin (MIACALCIN) 200 UNIT/ACT nasal spray     Si spray by Nasal route daily     Dispense:  3 Bottle     Refill:  3     **Patient requests 90 days supply**    atorvastatin (LIPITOR) 40 MG tablet     Sig: Take 1 tablet by mouth daily     Dispense:  90 tablet     Refill:  3     **Patient requests 90 days supply**    busPIRone (BUSPAR) 10 MG tablet     Sig: Take 1 tablet by mouth 3 times daily as needed (increased anxiety)     Dispense:  90 tablet     Refill:  1    Handicap Placard MISC     Sig: by Does not apply route Cannot walk 200 feet due to orthopedic/back pain  Expires in 5 years 26     Dispense:  1 each     Refill:  0       Patient given educationalmaterials - see patient instructions. Discussed use, benefit, and side effectsof prescribed medications. All patient questions answered. Pt voiced understanding. Reviewed health maintenance. Instructed to continue current medications, diet.   Patient agreed with treatment plan. Follow up as directed.      Electronicallysigned by Almas Lew MD on 4/22/2021 at 10:58 AM

## 2021-04-22 NOTE — PATIENT INSTRUCTIONS
Patient Education        Eating Healthy Foods: Care Instructions  Your Care Instructions     Eating healthy foods can help lower your risk for disease. Healthy food gives you energy and keeps your heart strong, your brain active, your muscles working, and your bones strong. A healthy diet includes a variety of foods from the basic food groups: grains, vegetables, fruits, milk and milk products, and meat and beans. Some people may eat more of their favorite foods from only one food group and, as a result, miss getting the nutrients they need. So, it is important to pay attention not only to what you eat but also to what you are missing from your diet. You can eat a healthy, balanced diet by making a few small changes. Follow-up care is a key part of your treatment and safety. Be sure to make and go to all appointments, and call your doctor if you are having problems. It's also a good idea to know your test results and keep a list of the medicines you take. How can you care for yourself at home? Look at what you eat  · Keep a food diary for a week or two and record everything you eat or drink. Track the number of servings you eat from each food group. · For a balanced diet every day, eat a variety of:  ? 6 or more ounce-equivalents of grains, such as cereals, breads, crackers, rice, or pasta, every day. An ounce-equivalent is 1 slice of bread, 1 cup of ready-to-eat cereal, or ½ cup of cooked rice, cooked pasta, or cooked cereal.  ? 2½ cups of vegetables, especially:  § Dark-green vegetables such as broccoli and spinach. § Orange vegetables such as carrots and sweet potatoes. § Dry beans (such as mock and kidney beans) and peas (such as lentils). ? 2 cups of fresh, frozen, or canned fruit. A small apple or 1 banana or orange equals 1 cup. ? 3 cups of nonfat or low-fat milk, yogurt, or other milk products. ? 5½ ounces of meat and beans, such as chicken, fish, lean meat, beans, nuts, and seeds.  One egg, 1 tablespoon of peanut butter, ½ ounce nuts or seeds, or ¼ cup of cooked beans equals 1 ounce of meat. · Learn how to read food labels for serving sizes and ingredients. Fast-food and convenience-food meals often contain few or no fruits or vegetables. Make sure you eat some fruits and vegetables to make the meal more nutritious. · Look at your food diary. For each food group, add up what you have eaten and then divide the total by the number of days. This will give you an idea of how much you are eating from each food group. See if you can find some ways to change your diet to make it more healthy. Start small  · Do not try to make dramatic changes to your diet all at once. You might feel that you are missing out on your favorite foods and then be more likely to fail. · Start slowly, and gradually change your habits. Try some of the following:  ? Use whole wheat bread instead of white bread. ? Use nonfat or low-fat milk instead of whole milk. ? Eat brown rice instead of white rice, and eat whole wheat pasta instead of white-flour pasta. ? Try low-fat cheeses and low-fat yogurt. ? Add more fruits and vegetables to meals and have them for snacks. ? Add lettuce, tomato, cucumber, and onion to sandwiches. ? Add fruit to yogurt and cereal.  Enjoy food  · You can still eat your favorite foods. You just may need to eat less of them. If your favorite foods are high in fat, salt, and sugar, limit how often you eat them, but do not cut them out entirely. · Eat a wide variety of foods. Make healthy choices when eating out  · The type of restaurant you choose can help you make healthy choices. Even fast-food chains are now offering more low-fat or healthier choices on the menu. · Choose smaller portions, or take half of your meal home. · When eating out, try:  ? A veggie pizza with a whole wheat crust or grilled chicken (instead of sausage or pepperoni).   ? Pasta with roasted vegetables, grilled chicken, or marinara sauce instead of cream sauce. ? A vegetable wrap or grilled chicken wrap. ? Broiled or poached food instead of fried or breaded items. Make healthy choices easy  · Buy packaged, prewashed, ready-to-eat fresh vegetables and fruits, such as baby carrots, salad mixes, and chopped or shredded broccoli and cauliflower. · Buy packaged, presliced fruits, such as melon or pineapple. · Choose 100% fruit or vegetable juice instead of soda. Limit juice intake to 4 to 6 oz (½ to ¾ cup) a day. · Blend low-fat yogurt, fruit juice, and canned or frozen fruit to make a smoothie for breakfast or a snack. Where can you learn more? Go to https://Unicorn Production.Sift. org and sign in to your [employer] account. Enter N363 in the [employer] box to learn more about \"Eating Healthy Foods: Care Instructions. \"     If you do not have an account, please click on the \"Sign Up Now\" link. Current as of: December 17, 2020               Content Version: 12.8  © 3206-1226 [employer]. Care instructions adapted under license by [facility] ([facility]). If you have questions about a medical condition or this instruction, always ask your healthcare professional. [address] any warranty or liability for your use of this information. Patient Education        Learning About Eating More Fruits and Vegetables  What are some quick tips for eating more fruits and vegetables? We're all encouraged to eat more fruits and vegetables. Yet it can seem like one more chore on the daily to-do list. But you can add color and crunch to your mealsand lots of nutritionwith these quick tips. · Brighten up your breakfast.  ? Add sliced fruit or frozen berries to your yogurt, pancakes, or cereal.  ? Blend fresh or frozen fruit, veggies, and yogurt with a little fruit juice, and you've got a tasty smoothie. ? Make your scrambled eggs a gourmet treat by adding onions, celery, and bell peppers.   ? Bake up some bran muffins with grated carrots added into the mix. · Make a livelier lunch. ? Jazz up tuna or chicken salad with apple chunks, celery, or grapesor all of them! ? Add cucumbers, avocado slices, tomatoes, and lettuce to your sandwiches. ? Kick up the flavor of grilled cheese sandwiches with spinach and tomatoes. ? Puree some potatoes or squash to add to tomato soup. · Add delicious veggies to dinner. ? Give more color and taste to salads. Stir in red cabbage, carrots, and bell peppers. Top salads with dried cranberries or raisins. \"Frost\" your salad with orange sections or strawberries. ? Keep a bag or two of frozen vegetables ready to pull out of the freezer for a side dish. ? Spice up spaghetti and meatballs with mushrooms and bell peppers. ? Roast vegetables like cauliflower or squash in the oven with olive oil to bring out their flavor. ? Season your veggie dish with herbs like basil and honorio and a splash of lemon juice and olive oil. ? If you've got a main dish in the oven, stick in a potato to round out your meal.  · Grab some healthy snacks on the go. ? Scoop up an apple, banana, or plum for a quick snack. ? Cut up raw fruits and veggies to keep in your fridge. Grapes, oranges, carrots, and celery are great choices. They'll be ready for a quick snack or an after-school treat. ? Dip raw vegetables in hummus or peanut butter. ? Keep dried fruit on hand for an easy \"take with you\" snack. · Make something sweetand healthy. ? Try baked apples or pears topped with cinnamon and honey for a delicious dessert. ? Make chocolate chip cookies even better with grated carrots added to the mix. Where can you learn more? Go to https://lauren.Sharetivity. org and sign in to your [employer] account. Enter F050 in the [place] box to learn more about \"Learning About Eating More Fruits and Vegetables. \"     If you do not have an account, please click on the \"Sign Up Now\" link.  Current as of: December 17, 2020               Content Version: 12.8  © 8443-6466 [employer]. Care instructions adapted under license by [facility] ([facility]). If you have questions about a medical condition or this instruction, always ask your healthcare professional. [address] any warranty or liability for your use of this information.

## 2021-04-28 ENCOUNTER — HOSPITAL ENCOUNTER (OUTPATIENT)
Age: 76
Discharge: HOME | End: 2021-04-28
Payer: MEDICARE

## 2021-04-28 DIAGNOSIS — Z51.81 MEDICATION MONITORING ENCOUNTER: ICD-10-CM

## 2021-04-28 DIAGNOSIS — I70.90 ATHEROSCLEROSIS: Chronic | ICD-10-CM

## 2021-04-28 LAB
ALT SERPL-CCNC: 12 U/L (ref 5–33)
AST SERPL-CCNC: 16 U/L
CHOLESTEROL/HDL RATIO: 1.8
CHOLESTEROL: 149 MG/DL
HDLC SERPL-MCNC: 82 MG/DL
LDL CHOLESTEROL: 56 MG/DL (ref 0–130)
TRIGL SERPL-MCNC: 57 MG/DL
VLDLC SERPL CALC-MCNC: NORMAL MG/DL (ref 1–30)

## 2021-04-28 PROCEDURE — 84460 ALANINE AMINO (ALT) (SGPT): CPT

## 2021-04-28 PROCEDURE — 84450 TRANSFERASE (AST) (SGOT): CPT

## 2021-04-28 PROCEDURE — 36415 COLL VENOUS BLD VENIPUNCTURE: CPT

## 2021-04-28 PROCEDURE — 80061 LIPID PANEL: CPT

## 2021-06-12 ASSESSMENT — ENCOUNTER SYMPTOMS
ALLERGIC/IMMUNOLOGIC NEGATIVE: 1
BACK PAIN: 1
GASTROINTESTINAL NEGATIVE: 1
CONSTIPATION: 0
EYES NEGATIVE: 1
EYE REDNESS: 0
COLOR CHANGE: 0
BOWEL INCONTINENCE: 0
SHORTNESS OF BREATH: 0
PHOTOPHOBIA: 0
NAUSEA: 0
VOMITING: 0
RESPIRATORY NEGATIVE: 1
SINUS PRESSURE: 0
COUGH: 0

## 2021-06-12 NOTE — PROGRESS NOTES
(chronic kidney disease) stage 3, GFR 30-59 ml/min ([facility])        Past Surgical History:   Procedure Laterality Date    APPENDECTOMY      BREAST LUMPECTOMY      right fatty tissue removed    BREAST REDUCTION SURGERY Right     BREAST SURGERY Left     left mastectomy w/reconstruction, tram    CATARACT REMOVAL Bilateral 03/03/2016    CHOLECYSTECTOMY      COLONOSCOPY      COLONOSCOPY  10/13/2016    Dr Shamar Mcfarland TUBAL LIGATION         Family History   Problem Relation Age of Onset    Heart Disease Father        Social History     Socioeconomic History    Marital status:      Spouse name: None    Number of children: 2    Years of education: 15    Highest education level: None   Occupational History    Occupation: retired   Tobacco Use    Smoking status: Current Every Day Smoker     Packs/day: 1.00     Years: 32.00     Pack years: 32.00    Smokeless tobacco: Never Used    Tobacco comment: 1 pack a day   Vaping Use    Vaping Use: Former    Substances: Occasionally   Substance and Sexual Activity    Alcohol use: Yes     Comment: occ. wine or rum.  Drug use: No    Sexual activity: None   Other Topics Concern    None   Social History Narrative    None     Social Determinants of Health     Financial Resource Strain:     Difficulty of Paying Living Expenses:    Food Insecurity:     Worried About Running Out of Food in the Last Year:     [address] in the Last Year:    Transportation Needs:     Lack of Transportation (Medical):      Lack of Transportation (Non-Medical):    Physical Activity:     Days of Exercise per Week:     Minutes of Exercise per Session:    Stress:     Feeling of Stress :    Social Connections:     Frequency of Communication with Friends and Family:     Frequency of Social Gatherings with Friends and Family:     Attends Faith Services:     Active Member of Clubs or Organizations:     Attends Club or Organization Meetings:     Marital Status:    Intimate Partner Violence:     Fear of Current or Ex-Partner:     Emotionally Abused:     Physically Abused:     Sexually Abused:        No Known Allergies    Current Outpatient Medications on File Prior to Encounter   Medication Sig Dispense Refill    calcitonin (MIACALCIN) 200 UNIT/ACT nasal spray 1 spray by Nasal route daily 3 Bottle 3    atorvastatin (LIPITOR) 40 MG tablet Take 1 tablet by mouth daily 90 tablet 3    Handicap Placard MISC by Does not apply route Cannot walk 200 feet due to orthopedic/back pain  Expires in 5 years 4/22/26 1 each 0    buPROPion (WELLBUTRIN XL) 300 MG extended release tablet Take 1 tablet by mouth every morning 90 tablet 1    citalopram (CELEXA) 20 MG tablet Take 1 tablet by mouth daily 90 tablet 0    aspirin (ASPIRIN CHILDRENS) 81 MG chewable tablet Take 1 tablet by mouth daily 30 tablet 3    Acetaminophen (TYLENOL 8 HOUR ARTHRITIS PAIN PO) Take by mouth      Multiple Vitamins-Minerals (MULTIVITAMIN ADULT PO) Take by mouth      Calcium Carb-Cholecalciferol (CALCIUM + D3) 600-200 MG-UNIT TABS tablet Take 1 tablet by mouth Daily with lunch       No current facility-administered medications on file prior to encounter. Review of Systems   Constitutional: Negative. Negative for activity change, appetite change, diaphoresis, fever and unexpected weight change. HENT: Negative. Negative for congestion, hearing loss, sinus pressure and sore throat. Eyes: Negative. Negative for photophobia, redness and visual disturbance. Respiratory: Negative. Negative for cough and shortness of breath. Cardiovascular: Negative. Negative for chest pain and palpitations. Gastrointestinal: Negative. Negative for bowel incontinence, constipation, nausea and vomiting. Endocrine: Negative. Negative for cold intolerance and polyphagia. Genitourinary: Negative. Negative for hematuria. Musculoskeletal: Positive for arthralgias and back pain. Negative for myalgias.    Skin: Negative. Negative for color change and rash. Allergic/Immunologic: Negative. Neurological: Negative for tingling, weakness, numbness and paresthesias. Both legs   Hematological: Negative. Does not bruise/bleed easily. Psychiatric/Behavioral: Negative. Negative for self-injury, sleep disturbance and suicidal ideas. The patient is not nervous/anxious. There is no change in the review of systems since her last visit. Physical Exam  Skin:         Neurological:      Mental Status: She is alert and oriented to person, place, and time. Psychiatric:         Mood and Affect: Mood normal.            DATA:  LAB.:  9/21/2020  2:54 PM - Naseem, [facility] Incoming Lab Results From [facility]    Component Value Ref Range & Units Status Collected Lab   Pain Management Drug Panel Interp, Urine Consistent   Final 09/17/2020 10:45 AM [facility]   (NOTE)   ________________________________________________________________   DRUGS EXPECTED:   HYDROCODONE   ________________________________________________________________   CONSISTENT with medications provided:   HYDROCODONE: based on hydrocodone, norhydrocodone        X-Ray reports:     EXAMINATION:    BONE DENSITOMETRY        10/12/2017 8:08 am        TECHNIQUE:    A bone density dual x-ray absorptiometry (DXA) scan was performed of the    lumbar spine and left hip on a [employer] system. COMPARISON:    08/29/2005        HISTORY:    ORDERING SYSTEM PROVIDED HISTORY: Osteoporosis screening        Postmenopausal, tobacco user        FINDINGS:    FINDINGS:        Lumbar spine:            L1-L4 BMD: 1.063 g/cm2, young adult/T.-score: -1.1, age-matched Z.-score: 1.0. There has been 12.5% decrease in the fluid lumbar BMD since the prior study. Left hip:            Total: 0.801 g/cm2, young adult/T.-score: -1.7, age-matched Z.-score: 0.2. Neck: 0.772 g/cm2, young adult/T.-score: -1.9, age-matched Z.-score: 0.1.     There has been 18% decrease in left hip total BMD since the prior study. Impression    According to the World Health Organization [facility]) classification:        Osteopenia. Fracture risk is moderate. Treatment is advised. COMPARISON:    CT abdomen pelvis 05/11/2017        HISTORY:    ORDERING SYSTEM PROVIDED HISTORY: Left hip pain    TECHNOLOGIST PROVIDED HISTORY:    pain    Ordering Physician Provided Reason for Exam: PT CO chronic L hip pain X 1    year with [facility]. Acuity: Chronic    Type of Exam: Initial        FINDINGS:    No evidence of acute fracture or dislocation. Bone mineralization and    alignment appear intact. Hip joints appear relatively symmetric with mild    degenerative changes. Enthesopathic spurring of the left greater trochanter. Mild degenerative changes of the sacroiliac joints and pubic symphysis. Partially visualized degenerative changes lower lumbar spine which appears to    be most prominently involving the facet joints. Impression    No acute osseous abnormality. Mild degenerative changes. EXAMINATION:    3 XRAY VIEWS OF THE LUMBAR SPINE        10/15/2019 11:08 am        COMPARISON:    None. HISTORY:    ORDERING SYSTEM PROVIDED HISTORY: Degeneration of lumbar or lumbosacral    intervertebral disc    TECHNOLOGIST PROVIDED HISTORY:    pain    Reason for Exam: Pt states low back pain. Many years. Pt has scoliosis. Acuity: Unknown    Type of Exam: Unknown        FINDINGS:    Levoscoliosis of the lumbar spine. Scoliosis measures 37 degrees from L1    through L3. Multilevel degenerative changes with disc height loss and facet    arthropathy. Intact spinous processes. Vascular calcifications. Impression    Degenerative-type scoliosis of the lumbar spine with multilevel degenerative    change. Clinical  impression:  1. Degeneration of lumbar or lumbosacral intervertebral disc    2. Lumbosacral spondylosis without myelopathy    3.  Spinal stenosis, lumbar region, without neurogenic claudication    4. Lumbar radiculopathy, chronic    5. Sacroiliitis (HCC)-right    6. Medication management    7. Medication monitoring encounter        Plan of care: We will continue her on her Norco for pain as patient swallowing Percocet and it did not help her pain  Current medications are being tolerated without any Adverse side effects. Orders Placed This Encounter   Medications    HYDROcodone-acetaminophen (NORCO) 5-325 MG per tablet     Sig: Take 1 tablet by mouth 2 times daily as needed for Pain for up to 30 days. Dispense:  60 tablet     Refill:  0     Reduce doses taken as pain becomes manageable    DISCONTD: HYDROcodone-acetaminophen (NORCO) 5-325 MG per tablet     Sig: Take 1 tablet by mouth every 6 hours as needed for Pain for up to 30 days. Dispense:  120 tablet     Refill:  0     Reduce doses taken as pain becomes manageable     Urine drug screens have been appropriate. No aberrant activity noted. Analgesia is achieved. Activities of daily living are possible because of medications. Safe use of medications explained to patient. PDMP Monitoring:    Last PDMP Dalton Mcfadden as Reviewed [facility]):  Review User Review Instant Review Result   Madai Cazares 6/12/2021  4:22 AM Reviewed PDMP [1]     Counselling/Preventive measures for pain  Control:    [x]  Spine strengthening exercises are discussed with patient in detail. [x]  Patient is counseled about  ill effects of smoking for 5 minutes -Patient is strongly urged to quit smoking   Following health benefits were discussed:  People who stop smoking greatly reduce their risk for disease and premature death. Lowered risk for lung cancer and many other types of cancer. Reduced risk for coronary heart disease, stroke, and peripheral vascular disease. Reduced coronary heart disease risk within 1 to 2 years of quitting. Reduced respiratory symptoms, such as coughing, wheezing, and shortness of breath.  The rate of decline in lung function is slower among people who quit smoking than among those who continue to smoke. Reduced risk of developing chronic obstructive pulmonary disease (COPD), one of the leading causes of death in the [place]. Reduced risk for infertility in women of reproductive age. Women who stop smoking during pregnancy also reduce their risk of having a low birth weight baby. Reduced risk of bone and joint damage. Methods to Quit Smoking  The majority of cigarette smokers quit without using evidence-based   Counseling and medication are both effective for treating tobacco dependence, and using them together is more effective than using either one alone. Patient is advised to follow up with his physician for further management. [x] Ill effects of being on chronic pain medications such as sleep disturbances, hormonal changes, withdrawal symptoms,  chronic opioid dependence and tolerance were discussed with patient. I had asked the patient to minimize medication use and utilize pain medications only for uncontrolled rest pain or pain with exertional activities. I advised patient not to self escalate pain medications without consulting with us. At each of patient's future visits we will try to taper pain medications, while adjusting the adjunct medications, and re-evaluating for Physical Therapy to improve spinal and joint strength. We will continue to have discussions to decrease pain medications as tolerated. I also discussed with the patient regarding the dangers of combining narcotic pain medication with tranquilizers, alcohol or illegal drugs or taking the medication any other than prescribed. The dangers including the respiratory depression and death. Patient was told to tell  to all  physicians regarding the medications he is getting from pain clinic. Patient is warned not to take any unprescribed medications over-the-counter medications that can depress breathing .  Patient is advised to talk to the pharmacist or physicians if planning to take any over-the-counter medications before  takeing them. Patient is strongly advised to avoid tranquilizers or  Relaxants for any medications that can depress breathing or recreational drugs. Patient is also advised to tell us if there is any changes in his medications from other physicians. We discussed the same at today's visit and have not been to implement it, as the patient's pain is not under control with current medications. Decision Making Process : Patient's health history and referral records thoroughly reviewed before focused physical examination and discussion with patient. I have spent 25 mins. Over 50% of today's visit is spent on examining the patient and counseling and coordinating the care. Level of complexity of date to be reviewed is Moderate. The chart date reviewed include the following: Imaging Reports. Summary of Care. Time spent reviewing with patient the below reports:   Medication safety, Treatment options. Level of diagnosis and management options of this case is multiple: involving the following management options: Interventions as needed, medication management as appropriate, future visits, activity modification, heat/ice as needed, Urine drug screen as required. [x]The patient's questions were answered to the best of my abilities. This note was created using voice recognition software. There may be inaccuracies of transcription  that are inadvertently overlooked prior to the signature. There is any questions about the transcription please contact me. Return in  4 weeks  with physician / CNP  for further plan of treatment. Due to the COVID-19 pandemic and the appropriate interventions by Lee Brown, our non-urgent pain management patients will not be seen in the office at this time for their protection and the protection of our staff.  To offer continuity of care, their prescriptions

## 2021-06-15 ENCOUNTER — HOSPITAL ENCOUNTER (OUTPATIENT)
Dept: PAIN MANAGEMENT | Age: 76
Discharge: HOME | End: 2021-06-15
Payer: MEDICARE

## 2021-06-15 DIAGNOSIS — Z79.899 MEDICATION MANAGEMENT: ICD-10-CM

## 2021-06-15 DIAGNOSIS — M46.1 SACROILIITIS (HCC): ICD-10-CM

## 2021-06-15 DIAGNOSIS — M51.37 DEGENERATION OF LUMBAR OR LUMBOSACRAL INTERVERTEBRAL DISC: Primary | ICD-10-CM

## 2021-06-15 DIAGNOSIS — M54.16 LUMBAR RADICULOPATHY, CHRONIC: ICD-10-CM

## 2021-06-15 DIAGNOSIS — M48.061 SPINAL STENOSIS, LUMBAR REGION, WITHOUT NEUROGENIC CLAUDICATION: ICD-10-CM

## 2021-06-15 DIAGNOSIS — M47.817 LUMBOSACRAL SPONDYLOSIS WITHOUT MYELOPATHY: ICD-10-CM

## 2021-06-15 DIAGNOSIS — Z51.81 MEDICATION MONITORING ENCOUNTER: ICD-10-CM

## 2021-06-15 PROCEDURE — 99214 OFFICE O/P EST MOD 30 MIN: CPT | Performed by: PAIN MEDICINE

## 2021-06-15 PROCEDURE — 99213 OFFICE O/P EST LOW 20 MIN: CPT

## 2021-06-15 RX ORDER — HYDROCODONE BITARTRATE AND ACETAMINOPHEN 5; 325 MG/1; MG/1
1 TABLET ORAL EVERY 6 HOURS PRN
Qty: 120 TABLET | Refills: 0 | Status: SHIPPED | OUTPATIENT
Start: 2021-06-15 | End: 2021-06-15 | Stop reason: CLARIF

## 2021-06-15 RX ORDER — HYDROCODONE BITARTRATE AND ACETAMINOPHEN 5; 325 MG/1; MG/1
1 TABLET ORAL 2 TIMES DAILY PRN
Qty: 60 TABLET | Refills: 0 | Status: SHIPPED | OUTPATIENT
Start: 2021-06-15 | End: 2021-07-12 | Stop reason: SDUPTHER

## 2021-06-15 ASSESSMENT — ENCOUNTER SYMPTOMS: SORE THROAT: 0

## 2021-07-12 ENCOUNTER — HOSPITAL ENCOUNTER (OUTPATIENT)
Dept: PAIN MANAGEMENT | Age: 76
Discharge: HOME | End: 2021-07-12
Payer: MEDICARE

## 2021-07-12 DIAGNOSIS — M47.817 LUMBOSACRAL SPONDYLOSIS WITHOUT MYELOPATHY: ICD-10-CM

## 2021-07-12 DIAGNOSIS — M51.37 DEGENERATION OF LUMBAR OR LUMBOSACRAL INTERVERTEBRAL DISC: ICD-10-CM

## 2021-07-12 DIAGNOSIS — M54.16 LUMBAR RADICULOPATHY, CHRONIC: ICD-10-CM

## 2021-07-12 DIAGNOSIS — M48.061 SPINAL STENOSIS, LUMBAR REGION, WITHOUT NEUROGENIC CLAUDICATION: ICD-10-CM

## 2021-07-12 DIAGNOSIS — Z51.81 MEDICATION MONITORING ENCOUNTER: ICD-10-CM

## 2021-07-12 DIAGNOSIS — Z79.899 MEDICATION MANAGEMENT: ICD-10-CM

## 2021-07-12 PROCEDURE — 99212 OFFICE O/P EST SF 10 MIN: CPT | Performed by: NURSE PRACTITIONER

## 2021-07-12 PROCEDURE — 99213 OFFICE O/P EST LOW 20 MIN: CPT

## 2021-07-12 RX ORDER — HYDROCODONE BITARTRATE AND ACETAMINOPHEN 5; 325 MG/1; MG/1
1 TABLET ORAL 2 TIMES DAILY PRN
Qty: 60 TABLET | Refills: 0 | Status: SHIPPED | OUTPATIENT
Start: 2021-07-15 | End: 2021-08-10 | Stop reason: SDUPTHER

## 2021-07-12 ASSESSMENT — ENCOUNTER SYMPTOMS
COUGH: 0
CONSTIPATION: 0
SHORTNESS OF BREATH: 0
BACK PAIN: 1

## 2021-07-12 NOTE — PROGRESS NOTES
Patient competed a video visit today to review medication contract. Chief Complaint: back pain    [facility]  Patient complains of back pain that radiates down her right leg. Lumbar XR shows Degenerative-type scoliosis of the lumbar spine with multilevel degenerative change. She has never had back surgery.  She has had PT and chiropractic care with mild benefit. . She has had LESI in the past with mild relief lasting one month. Back Pain  This is a chronic problem. The current episode started more than 1 year ago. The problem occurs constantly. The problem is unchanged. The pain is present in the lumbar spine. The quality of the pain is described as aching. The pain does not radiate. The pain is at a severity of 4/10. The pain is moderate. The symptoms are aggravated by position and standing (walking). Pertinent negatives include no chest pain, fever, numbness, paresthesias or tingling. She has tried analgesics and bed rest for the symptoms. The treatment provided mild relief. Patient denies any new neurological symptoms. No bowel or bladder incontinence, no weakness, and no falling. Pill count: appropriate due 7/15     Morphine equivalent: 10    Periodic Controlled Substance Monitoring: Possible medication side effects, risk of tolerance/dependence & alternative treatments discussed., No signs of potential drug abuse or diversion identified., Obtaining appropriate analgesic effect of treatment.  (Gallito Romero, APRN - CNP)      Past Medical History:   Diagnosis Date    Anxiety     Cancer [facility])     breast cancer, left     CKD (chronic kidney disease) stage 3, GFR 30-59 ml/min ([facility])        Past Surgical History:   Procedure Laterality Date    APPENDECTOMY      BREAST LUMPECTOMY      right fatty tissue removed    BREAST REDUCTION SURGERY Right     BREAST SURGERY Left     left mastectomy w/reconstruction, tram    CATARACT REMOVAL Bilateral 03/03/2016    CHOLECYSTECTOMY      COLONOSCOPY  COLONOSCOPY  10/13/2016    Dr Candelaria Mccain TUBAL LIGATION         No Known Allergies      Current Outpatient Medications:     HYDROcodone-acetaminophen (NORCO) 5-325 MG per tablet, Take 1 tablet by mouth 2 times daily as needed for Pain for up to 30 days. , Disp: 60 tablet, Rfl: 0    calcitonin (MIACALCIN) 200 UNIT/ACT nasal spray, 1 spray by Nasal route daily, Disp: 3 Bottle, Rfl: 3    atorvastatin (LIPITOR) 40 MG tablet, Take 1 tablet by mouth daily, Disp: 90 tablet, Rfl: 3    Handicap Placard MISC, by Does not apply route Cannot walk 200 feet due to orthopedic/back pain Expires in 5 years 4/22/26, Disp: 1 each, Rfl: 0    buPROPion (WELLBUTRIN XL) 300 MG extended release tablet, Take 1 tablet by mouth every morning, Disp: 90 tablet, Rfl: 1    citalopram (CELEXA) 20 MG tablet, Take 1 tablet by mouth daily, Disp: 90 tablet, Rfl: 0    aspirin (ASPIRIN CHILDRENS) 81 MG chewable tablet, Take 1 tablet by mouth daily, Disp: 30 tablet, Rfl: 3    Acetaminophen (TYLENOL 8 HOUR ARTHRITIS PAIN PO), Take by mouth, Disp: , Rfl:     Multiple Vitamins-Minerals (MULTIVITAMIN ADULT PO), Take by mouth, Disp: , Rfl:     Calcium Carb-Cholecalciferol (CALCIUM + D3) 600-200 MG-UNIT TABS tablet, Take 1 tablet by mouth Daily with lunch, Disp: , Rfl:     Family History   Problem Relation Age of Onset    Heart Disease Father        Social History     Socioeconomic History    Marital status:      Spouse name: Not on file    Number of children: 2    Years of education: 15    Highest education level: Not on file   Occupational History    Occupation: retired   Tobacco Use    Smoking status: Current Every Day Smoker     Packs/day: 1.00     Years: 32.00     Pack years: 32.00    Smokeless tobacco: Never Used    Tobacco comment: 1 pack a day   Vaping Use    Vaping Use: Former    Substances: Occasionally   Substance and Sexual Activity    Alcohol use: Yes     Comment: occ. wine or rum.     Drug use: No    Sexual activity: Not on file   Other Topics Concern    Not on file   Social History Narrative    Not on file     Social Determinants of Health     Financial Resource Strain:     Difficulty of Paying Living Expenses:    Food Insecurity:     Worried About Running Out of Food in the Last Year:     [address] in the Last Year:    Transportation Needs:     Lack of Transportation (Medical):  Lack of Transportation (Non-Medical):    Physical Activity:     Days of Exercise per Week:     Minutes of Exercise per Session:    Stress:     Feeling of Stress :    Social Connections:     Frequency of Communication with Friends and Family:     Frequency of Social Gatherings with Friends and Family:     Attends Anabaptism Services:     Active Member of Clubs or Organizations:     Attends Club or Organization Meetings:     Marital Status:    Intimate Partner Violence:     Fear of Current or Ex-Partner:     Emotionally Abused:     Physically Abused:     Sexually Abused:        Review of Systems:  Review of Systems   Constitutional: Negative for chills and fever. Cardiovascular: Negative for chest pain and palpitations. Respiratory: Negative for cough and shortness of breath. Musculoskeletal: Positive for back pain. Gastrointestinal: Negative for constipation. Neurological: Negative for disturbances in coordination, loss of balance, numbness, paresthesias and tingling. Physical Exam:  There were no vitals taken for this visit. Physical Exam  HENT:      Head: Normocephalic. Pulmonary:      Effort: Pulmonary effort is normal.   Neurological:      Mental Status: She is alert.    Psychiatric:         Mood and Affect: Mood normal.         Behavior: Behavior normal.         Record/Diagnostics Review:    Last sharee 9/2020 and was appropriate     ABERRANT BEHAVIORS SINCE LAST VISIT  Lost rx/pills:------------------------------------------ no  Taking  medication as prescribed: ----------- yes  Urine Drug Screen ---------------------------------  yes             Date------------------------------------------------- 9/17/2020              Results as expected ---------------------yes     Recent ER visits: -------------------------------------No  Pill count is appropriate: ---------------------------yes   Refills for prescriptions appropriate:---------- yes    Assessment:  Problem List Items Addressed This Visit     Degeneration of lumbar or lumbosacral intervertebral disc (Chronic)    Relevant Medications    HYDROcodone-acetaminophen ([address]) 5-325 MG per tablet (Start on 7/15/2021)    Lumbosacral spondylosis without myelopathy (Chronic)    Relevant Medications    HYDROcodone-acetaminophen (NORCO) 5-325 MG per tablet (Start on 7/15/2021)    Spinal stenosis, lumbar region, without neurogenic claudication (Chronic)    Relevant Medications    HYDROcodone-acetaminophen ([address]) 5-325 MG per tablet (Start on 7/15/2021)    Medication monitoring encounter    Relevant Medications    HYDROcodone-acetaminophen ([address]) 5-325 MG per tablet (Start on 7/15/2021)    Lumbar radiculopathy, chronic    Relevant Medications    HYDROcodone-acetaminophen (NORCO) 5-325 MG per tablet (Start on 7/15/2021)      Other Visit Diagnoses     Medication management        Relevant Medications    HYDROcodone-acetaminophen ([address]) 5-325 MG per tablet (Start on 7/15/2021)             Treatment Plan:  Patient relates current medications are helping the pain. Patient reports taking pain medications as prescribed, denies obtaining medications from different sources and denies use of illegal drugs. Patient denies side effects from medications like nausea, vomiting, constipation or drowsiness. Patient reports current activities of daily living are possible due to medications and would like to continue them.      As always, we encourage daily stretching and strengthening exercises, and recommend minimizing use of pain medications unless patient cannot get through daily activities due to pain. Contract requirements met. Continue opioid therapy. Script written for norco  Follow up appointment made for 4 weeks    Coub, was evaluated through a synchronous (real-time) audio-video encounter. The patient (or guardian if applicable) is aware that this is a billable service. Verbal consent to proceed has been obtained within the past 12 months. The visit was conducted pursuant to the emergency declaration under the [address] [place] [address] and the [employer] and dscovered General Act. Patient identification was verified, and a caregiver was present when appropriate. The patient was located in a state where the provider was credentialed to provide care. Total time spent for this encounter: Not billed by time    --CHI Venegas CNP on 7/12/2021 at 4:12 PM    An electronic signature was used to authenticate this note.

## 2021-07-22 ENCOUNTER — OFFICE VISIT (OUTPATIENT)
Dept: PRIMARY CARE CLINIC | Age: 76
End: 2021-07-22
Payer: MEDICARE

## 2021-07-22 VITALS
BODY MASS INDEX: 22.18 KG/M2 | OXYGEN SATURATION: 98 % | HEART RATE: 86 BPM | SYSTOLIC BLOOD PRESSURE: 132 MMHG | DIASTOLIC BLOOD PRESSURE: 64 MMHG | WEIGHT: 110 LBS | HEIGHT: 59 IN

## 2021-07-22 DIAGNOSIS — F41.9 ANXIETY: ICD-10-CM

## 2021-07-22 DIAGNOSIS — Z51.81 MEDICATION MONITORING ENCOUNTER: Primary | ICD-10-CM

## 2021-07-22 DIAGNOSIS — F33.41 RECURRENT MAJOR DEPRESSIVE DISORDER, IN PARTIAL REMISSION (HCC): ICD-10-CM

## 2021-07-22 PROCEDURE — 99213 OFFICE O/P EST LOW 20 MIN: CPT | Performed by: FAMILY MEDICINE

## 2021-07-22 PROCEDURE — G8420 CALC BMI NORM PARAMETERS: HCPCS | Performed by: FAMILY MEDICINE

## 2021-07-22 PROCEDURE — 1090F PRES/ABSN URINE INCON ASSESS: CPT | Performed by: FAMILY MEDICINE

## 2021-07-22 PROCEDURE — 1123F ACP DISCUSS/DSCN MKR DOCD: CPT | Performed by: FAMILY MEDICINE

## 2021-07-22 PROCEDURE — G8399 PT W/DXA RESULTS DOCUMENT: HCPCS | Performed by: FAMILY MEDICINE

## 2021-07-22 PROCEDURE — 4040F PNEUMOC VAC/ADMIN/RCVD: CPT | Performed by: FAMILY MEDICINE

## 2021-07-22 PROCEDURE — G8427 DOCREV CUR MEDS BY ELIG CLIN: HCPCS | Performed by: FAMILY MEDICINE

## 2021-07-22 PROCEDURE — 3017F COLORECTAL CA SCREEN DOC REV: CPT | Performed by: FAMILY MEDICINE

## 2021-07-22 PROCEDURE — 4004F PT TOBACCO SCREEN RCVD TLK: CPT | Performed by: FAMILY MEDICINE

## 2021-07-22 SDOH — ECONOMIC STABILITY: FOOD INSECURITY: WITHIN THE PAST 12 MONTHS, THE FOOD YOU BOUGHT JUST DIDN'T LAST AND YOU DIDN'T HAVE MONEY TO GET MORE.: NEVER TRUE

## 2021-07-22 SDOH — ECONOMIC STABILITY: FOOD INSECURITY: WITHIN THE PAST 12 MONTHS, YOU WORRIED THAT YOUR FOOD WOULD RUN OUT BEFORE YOU GOT MONEY TO BUY MORE.: NEVER TRUE

## 2021-07-22 ASSESSMENT — SOCIAL DETERMINANTS OF HEALTH (SDOH): HOW HARD IS IT FOR YOU TO PAY FOR THE VERY BASICS LIKE FOOD, HOUSING, MEDICAL CARE, AND HEATING?: NOT HARD AT ALL

## 2021-07-22 ASSESSMENT — ENCOUNTER SYMPTOMS: BACK PAIN: 1

## 2021-07-22 NOTE — PROGRESS NOTES
Musculoskeletal: Positive for arthralgias and back pain.   eat a lot of cauliflower lately  She's going to [facility] in October for the winter    Objective:     /64   Pulse 86   Ht 4' 11\" (1.499 m)   Wt 110 lb (49.9 kg)   SpO2 98%   BMI 22.22 kg/m²   Physical Exam  Vitals and nursing note reviewed. Constitutional:       Appearance: Normal appearance. HENT:      Head: Normocephalic and atraumatic. Neck:      Comments: Neck ROM decreased  Tender to palpation right lateral neck muscle  TMJ's not tender  Neurological:      Mental Status: She is alert and oriented to person, place, and time. Psychiatric:         Mood and Affect: Mood normal.         Behavior: Behavior normal.         Thought Content: Thought content normal.         Assessment:       Diagnosis Orders   1. Medication monitoring encounter     2. Recurrent major depressive disorder, in partial remission ([facility] [address]     3. Anxiety          Plan:      No follow-ups on file. Had covid vaccine at [facility]. Monitor weight at home monthly  No orders of the defined types were placed in this encounter. No orders of the defined types were placed in this encounter. Patient given educationalmaterials - see patient instructions. Discussed use, benefit, and side effectsof prescribed medications. All patient questions answered. Pt voiced understanding. Reviewed health maintenance. Instructed to continue current medications, diet   Patient agreed with treatment plan. Follow up as directed.      Electronicallysigned by Vi Quigley MD on 7/22/2021 at 1:37 PM

## 2021-08-10 ENCOUNTER — HOSPITAL ENCOUNTER (OUTPATIENT)
Dept: PAIN MANAGEMENT | Age: 76
Discharge: HOME | End: 2021-08-10
Payer: MEDICARE

## 2021-08-10 DIAGNOSIS — M51.37 DEGENERATION OF LUMBAR OR LUMBOSACRAL INTERVERTEBRAL DISC: ICD-10-CM

## 2021-08-10 DIAGNOSIS — Z51.81 MEDICATION MONITORING ENCOUNTER: ICD-10-CM

## 2021-08-10 DIAGNOSIS — M48.061 SPINAL STENOSIS, LUMBAR REGION, WITHOUT NEUROGENIC CLAUDICATION: ICD-10-CM

## 2021-08-10 DIAGNOSIS — M47.817 LUMBOSACRAL SPONDYLOSIS WITHOUT MYELOPATHY: ICD-10-CM

## 2021-08-10 DIAGNOSIS — M54.16 LUMBAR RADICULOPATHY, CHRONIC: ICD-10-CM

## 2021-08-10 DIAGNOSIS — Z79.899 MEDICATION MANAGEMENT: ICD-10-CM

## 2021-08-10 PROCEDURE — 99212 OFFICE O/P EST SF 10 MIN: CPT | Performed by: NURSE PRACTITIONER

## 2021-08-10 PROCEDURE — 99213 OFFICE O/P EST LOW 20 MIN: CPT

## 2021-08-10 RX ORDER — HYDROCODONE BITARTRATE AND ACETAMINOPHEN 5; 325 MG/1; MG/1
1 TABLET ORAL 2 TIMES DAILY PRN
Qty: 60 TABLET | Refills: 0 | Status: SHIPPED | OUTPATIENT
Start: 2021-08-14 | End: 2021-09-10 | Stop reason: SDUPTHER

## 2021-08-10 ASSESSMENT — ENCOUNTER SYMPTOMS
SHORTNESS OF BREATH: 0
COUGH: 0
BACK PAIN: 1
CONSTIPATION: 0

## 2021-08-10 NOTE — PROGRESS NOTES
Patient completed a video visit today to review medication contract. Chief Complaint: back pain    [facility]  Patient complains of back pain that radiates down her right leg. Lumbar XR shows Degenerative-type scoliosis of the lumbar spine with multilevel degenerative change. She has never had back surgery.  She has had PT and chiropractic care with mild benefit. . She has had LESI in the past with mild relief lasting one month. Back Pain  This is a chronic problem. The current episode started more than 1 year ago. The problem occurs constantly. The problem is unchanged. The pain is present in the lumbar spine. The quality of the pain is described as aching. The pain does not radiate. The pain is at a severity of 3/10. The pain is mild. The symptoms are aggravated by position and standing. Pertinent negatives include no chest pain, fever, numbness, paresthesias or tingling. She has tried analgesics and bed rest for the symptoms. The treatment provided mild relief. Patient denies any new neurological symptoms. No bowel or bladder incontinence, no weakness, and no falling. Pill count: appropriate due 8/14    Morphine equivalent: 10    Periodic Controlled Substance Monitoring: Possible medication side effects, risk of tolerance/dependence & alternative treatments discussed., No signs of potential drug abuse or diversion identified., Obtaining appropriate analgesic effect of treatment.  (Yadira Thompson, APRN - CNP)      Past Medical History:   Diagnosis Date    Anxiety     Cancer [facility])     breast cancer, left     CKD (chronic kidney disease) stage 3, GFR 30-59 ml/min ([facility])        Past Surgical History:   Procedure Laterality Date    APPENDECTOMY      BREAST LUMPECTOMY      right fatty tissue removed    BREAST REDUCTION SURGERY Right     BREAST SURGERY Left     left mastectomy w/reconstruction, tram    CATARACT REMOVAL Bilateral 03/03/2016    CHOLECYSTECTOMY      COLONOSCOPY      COLONOSCOPY 10/13/2016    Dr Garnett Schlatter TUBAL LIGATION         No Known Allergies      Current Outpatient Medications:     HYDROcodone-acetaminophen (NORCO) 5-325 MG per tablet, Take 1 tablet by mouth 2 times daily as needed for Pain for up to 30 days. , Disp: 60 tablet, Rfl: 0    calcitonin (MIACALCIN) 200 UNIT/ACT nasal spray, 1 spray by Nasal route daily, Disp: 3 Bottle, Rfl: 3    atorvastatin (LIPITOR) 40 MG tablet, Take 1 tablet by mouth daily, Disp: 90 tablet, Rfl: 3    Handicap Placard MISC, by Does not apply route Cannot walk 200 feet due to orthopedic/back pain Expires in 5 years 4/22/26, Disp: 1 each, Rfl: 0    buPROPion (WELLBUTRIN XL) 300 MG extended release tablet, Take 1 tablet by mouth every morning, Disp: 90 tablet, Rfl: 1    citalopram (CELEXA) 20 MG tablet, Take 1 tablet by mouth daily, Disp: 90 tablet, Rfl: 0    aspirin (ASPIRIN CHILDRENS) 81 MG chewable tablet, Take 1 tablet by mouth daily, Disp: 30 tablet, Rfl: 3    Acetaminophen (TYLENOL 8 HOUR ARTHRITIS PAIN PO), Take by mouth, Disp: , Rfl:     Multiple Vitamins-Minerals (MULTIVITAMIN ADULT PO), Take by mouth, Disp: , Rfl:     Calcium Carb-Cholecalciferol (CALCIUM + D3) 600-200 MG-UNIT TABS tablet, Take 1 tablet by mouth Daily with lunch, Disp: , Rfl:     Family History   Problem Relation Age of Onset    Heart Disease Father        Social History     Socioeconomic History    Marital status:      Spouse name: Not on file    Number of children: 2    Years of education: 15    Highest education level: Not on file   Occupational History    Occupation: retired   Tobacco Use    Smoking status: Current Every Day Smoker     Packs/day: 1.00     Years: 32.00     Pack years: 32.00    Smokeless tobacco: Never Used    Tobacco comment: 1 pack a day   Vaping Use    Vaping Use: Former    Substances: Occasionally   Substance and Sexual Activity    Alcohol use: Yes     Comment: occ. wine or rum.     Drug use: No    Sexual activity: Not on file Other Topics Concern    Not on file   Social History Narrative    Not on file     Social Determinants of Health     Financial Resource Strain: Low Risk     Difficulty of Paying Living Expenses: Not hard at all   Food Insecurity: No Food Insecurity    Worried About Running Out of Food in the Last Year: Never true    [address] in the Last Year: Never true   Transportation Needs:     Lack of Transportation (Medical):  Lack of Transportation (Non-Medical):    Physical Activity:     Days of Exercise per Week:     Minutes of Exercise per Session:    Stress:     Feeling of Stress :    Social Connections:     Frequency of Communication with Friends and Family:     Frequency of Social Gatherings with Friends and Family:     Attends Christianity Services:     Active Member of Clubs or Organizations:     Attends Club or Organization Meetings:     Marital Status:    Intimate Partner Violence:     Fear of Current or Ex-Partner:     Emotionally Abused:     Physically Abused:     Sexually Abused:        Review of Systems:  Review of Systems   Constitutional: Negative for chills and fever. Cardiovascular: Negative for chest pain and palpitations. Respiratory: Negative for cough and shortness of breath. Musculoskeletal: Positive for back pain. Gastrointestinal: Negative for constipation. Neurological: Negative for disturbances in coordination, loss of balance, numbness, paresthesias and tingling. Physical Exam:  There were no vitals taken for this visit. Physical Exam  Neurological:      Mental Status: She is alert.    Psychiatric:         Mood and Affect: Mood normal.         Record/Diagnostics Review:    Last sharee 9/2020 and was appropriate    ABERRANT BEHAVIORS SINCE LAST VISIT  Lost rx/pills:------------------------------------------ no  Taking  medication as prescribed: ----------- yes  Urine Drug Screen

## 2021-09-10 ENCOUNTER — HOSPITAL ENCOUNTER (OUTPATIENT)
Dept: PAIN MANAGEMENT | Age: 76
Discharge: HOME | End: 2021-09-10
Payer: MEDICARE

## 2021-09-10 DIAGNOSIS — M48.061 SPINAL STENOSIS, LUMBAR REGION, WITHOUT NEUROGENIC CLAUDICATION: ICD-10-CM

## 2021-09-10 DIAGNOSIS — Z79.899 MEDICATION MANAGEMENT: ICD-10-CM

## 2021-09-10 DIAGNOSIS — Z51.81 MEDICATION MONITORING ENCOUNTER: Primary | ICD-10-CM

## 2021-09-10 DIAGNOSIS — M51.37 DEGENERATION OF LUMBAR OR LUMBOSACRAL INTERVERTEBRAL DISC: ICD-10-CM

## 2021-09-10 DIAGNOSIS — M47.817 LUMBOSACRAL SPONDYLOSIS WITHOUT MYELOPATHY: ICD-10-CM

## 2021-09-10 DIAGNOSIS — M54.16 LUMBAR RADICULOPATHY, CHRONIC: ICD-10-CM

## 2021-09-10 PROCEDURE — 99213 OFFICE O/P EST LOW 20 MIN: CPT | Performed by: NURSE PRACTITIONER

## 2021-09-10 PROCEDURE — 99213 OFFICE O/P EST LOW 20 MIN: CPT

## 2021-09-10 RX ORDER — HYDROCODONE BITARTRATE AND ACETAMINOPHEN 5; 325 MG/1; MG/1
1 TABLET ORAL 2 TIMES DAILY PRN
Qty: 60 TABLET | Refills: 0 | Status: SHIPPED | OUTPATIENT
Start: 2021-09-13 | End: 2021-10-12 | Stop reason: SDUPTHER

## 2021-09-10 ASSESSMENT — ENCOUNTER SYMPTOMS
COUGH: 0
BACK PAIN: 1
SHORTNESS OF BREATH: 0
CONSTIPATION: 0

## 2021-09-10 NOTE — PROGRESS NOTES
Patient completed a video visit today to review medication contract. Chief Complaint: back pain    [facility]  Patient complains of back pain that radiates down her right leg. Lumbar XR shows Degenerative-type scoliosis of the lumbar spine with multilevel degenerative change. She has never had back surgery.  She has had PT and chiropractic care with mild benefit. . She has had LESI in the past with mild relief lasting one month.       Back Pain  This is a chronic problem. The current episode started more than 1 year ago. The problem occurs constantly. The problem is unchanged. The pain is present in the lumbar spine. The quality of the pain is described as aching. Radiates to: into right hip. The pain is at a severity of 5/10. The pain is moderate. The symptoms are aggravated by position, standing and bending (walking). Pertinent negatives include no chest pain, fever, numbness, paresthesias or tingling. She has tried analgesics, bed rest and heat for the symptoms. The treatment provided mild relief. Patient denies any new neurological symptoms. No bowel or bladder incontinence, no weakness, and no falling. Pill count: appropriate due 9/13    Morphine equivalent: 10    Periodic Controlled Substance Monitoring: Possible medication side effects, risk of tolerance/dependence & alternative treatments discussed., No signs of potential drug abuse or diversion identified., Obtaining appropriate analgesic effect of treatment.  (Deborah Leyden, APRN - CNP)      Past Medical History:   Diagnosis Date    Anxiety     Cancer [facility])     breast cancer, left     CKD (chronic kidney disease) stage 3, GFR 30-59 ml/min (Formerly [facility])        Past Surgical History:   Procedure Laterality Date    APPENDECTOMY      BREAST LUMPECTOMY      right fatty tissue removed    BREAST REDUCTION SURGERY Right     BREAST SURGERY Left     left mastectomy w/reconstruction, tram    CATARACT REMOVAL Bilateral 03/03/2016    CHOLECYSTECTOMY  COLONOSCOPY      COLONOSCOPY  10/13/2016    Dr Diaz Flores TUBAL LIGATION         No Known Allergies      Current Outpatient Medications:     HYDROcodone-acetaminophen (NORCO) 5-325 MG per tablet, Take 1 tablet by mouth 2 times daily as needed for Pain for up to 30 days. , Disp: 60 tablet, Rfl: 0    atorvastatin (LIPITOR) 40 MG tablet, Take 1 tablet by mouth daily, Disp: 90 tablet, Rfl: 3    Handicap Placard MISC, by Does not apply route Cannot walk 200 feet due to orthopedic/back pain Expires in 5 years 4/22/26, Disp: 1 each, Rfl: 0    buPROPion (WELLBUTRIN XL) 300 MG extended release tablet, Take 1 tablet by mouth every morning, Disp: 90 tablet, Rfl: 1    citalopram (CELEXA) 20 MG tablet, Take 1 tablet by mouth daily, Disp: 90 tablet, Rfl: 0    aspirin (ASPIRIN CHILDRENS) 81 MG chewable tablet, Take 1 tablet by mouth daily, Disp: 30 tablet, Rfl: 3    Acetaminophen (TYLENOL 8 HOUR ARTHRITIS PAIN PO), Take by mouth, Disp: , Rfl:     Multiple Vitamins-Minerals (MULTIVITAMIN ADULT PO), Take by mouth, Disp: , Rfl:     Calcium Carb-Cholecalciferol (CALCIUM + D3) 600-200 MG-UNIT TABS tablet, Take 1 tablet by mouth Daily with lunch, Disp: , Rfl:     calcitonin (MIACALCIN) 200 UNIT/ACT nasal spray, 1 spray by Nasal route daily, Disp: 3 Bottle, Rfl: 3    Family History   Problem Relation Age of Onset    Heart Disease Father        Social History     Socioeconomic History    Marital status:      Spouse name: Not on file    Number of children: 2    Years of education: 12    Highest education level: Not on file   Occupational History    Occupation: retired   Tobacco Use    Smoking status: Current Every Day Smoker     Packs/day: 1.00     Years: 32.00     Pack years: 32.00    Smokeless tobacco: Never Used    Tobacco comment: 1 pack a day   Vaping Use    Vaping Use: Former    Substances: Occasionally   Substance and Sexual Activity    Alcohol use: Yes     Comment: occ. wine or rum.  Drug use:  No  Sexual activity: Not on file   Other Topics Concern    Not on file   Social History Narrative    Not on file     Social Determinants of Health     Financial Resource Strain: Low Risk     Difficulty of Paying Living Expenses: Not hard at all   Food Insecurity: No Food Insecurity    Worried About Running Out of Food in the Last Year: Never true    [address] in the Last Year: Never true   Transportation Needs:     Lack of Transportation (Medical):  Lack of Transportation (Non-Medical):    Physical Activity:     Days of Exercise per Week:     Minutes of Exercise per Session:    Stress:     Feeling of Stress :    Social Connections:     Frequency of Communication with Friends and Family:     Frequency of Social Gatherings with Friends and Family:     Attends Congregational Services:     Active Member of Clubs or Organizations:     Attends Club or Organization Meetings:     Marital Status:    Intimate Partner Violence:     Fear of Current or Ex-Partner:     Emotionally Abused:     Physically Abused:     Sexually Abused:        Review of Systems:  Review of Systems   Constitutional: Negative for chills and fever. Cardiovascular: Negative for chest pain and palpitations. Respiratory: Negative for cough and shortness of breath. Musculoskeletal: Positive for back pain. Gastrointestinal: Negative for constipation. Neurological: Negative for disturbances in coordination, loss of balance, numbness, paresthesias and tingling. Physical Exam:  There were no vitals taken for this visit. Physical Exam  HENT:      Head: Normocephalic. Pulmonary:      Effort: Pulmonary effort is normal.   Neurological:      Mental Status: She is alert.    Psychiatric:         Mood and Affect: Mood normal.         Behavior: Behavior normal.         Record/Diagnostics Review:    Last sharee 9/2020 and was appropriate     Assessment:  Problem List Items Addressed This Visit     Degeneration of lumbar or lumbosacral intervertebral disc (Chronic)    Relevant Medications    HYDROcodone-acetaminophen (NORCO) 5-325 MG per tablet (Start on 9/13/2021)    Lumbosacral spondylosis without myelopathy (Chronic)    Relevant Medications    HYDROcodone-acetaminophen (NORCO) 5-325 MG per tablet (Start on 9/13/2021)    Spinal stenosis, lumbar region, without neurogenic claudication (Chronic)    Relevant Medications    HYDROcodone-acetaminophen (NORCO) 5-325 MG per tablet (Start on 9/13/2021)    Other Relevant Orders    DRUG SCREEN, PAIN    Medication monitoring encounter - Primary    Relevant Medications    HYDROcodone-acetaminophen (NORCO) 5-325 MG per tablet (Start on 9/13/2021)    Other Relevant Orders    DRUG SCREEN, PAIN    Lumbar radiculopathy, chronic    Relevant Medications    HYDROcodone-acetaminophen (NORCO) 5-325 MG per tablet (Start on 9/13/2021)      Other Visit Diagnoses     Medication management        Relevant Medications    HYDROcodone-acetaminophen ([address]) 5-325 MG per tablet (Start on 9/13/2021)             Treatment Plan:  Patient relates current medications are helping the pain. Patient reports taking pain medications as prescribed, denies obtaining medications from different sources and denies use of illegal drugs. Patient denies side effects from medications like nausea, vomiting, constipation or drowsiness. Patient reports current activities of daily living are possible due to medications and would like to continue them. As always, we encourage daily stretching and strengthening exercises, and recommend minimizing use of pain medications unless patient cannot get through daily activities due to pain. Contract requirements met. Continue opioid therapy. Script written for norco  UDS for standard monitoring purposes  Follow up appointment made for 4 weeks    Lacy Alvarado, was evaluated through a synchronous (real-time) audio-video encounter.  The patient (or guardian if applicable) is aware that this is a billable service. Verbal consent to proceed has been obtained within the past 12 months. The visit was conducted pursuant to the emergency declaration under the [address] [place] [address] and the [employer] and [employer] General Act. Patient identification was verified, and a caregiver was present when appropriate. The patient was located in a state where the provider was credentialed to provide care. Total time spent for this encounter: Not billed by time    --CHI Johnson CNP on 9/10/2021 at 10:08 AM    An electronic signature was used to authenticate this note.

## 2021-09-14 ENCOUNTER — TELEPHONE (OUTPATIENT)
Dept: ONCOLOGY | Age: 76
End: 2021-09-14

## 2021-09-14 DIAGNOSIS — Z87.891 PERSONAL HISTORY OF NICOTINE DEPENDENCE: Primary | ICD-10-CM

## 2021-09-14 NOTE — TELEPHONE ENCOUNTER
Our records indicate that your patient is coming due for their annual lung cancer screening follow up testing. For your convenience, we have pended the order for the scan for you. If you do not agree with the need for the test, please cancel the order and let us know. Sincerely,    [address] [place] [address] Screening Program    Auto printed reminder letter sent to patient.
Pamela Herring
none

## 2021-09-28 ENCOUNTER — HOSPITAL ENCOUNTER (OUTPATIENT)
Age: 76
Setting detail: SPECIMEN
Discharge: HOME | End: 2021-09-28
Payer: MEDICARE

## 2021-09-28 DIAGNOSIS — M48.061 SPINAL STENOSIS, LUMBAR REGION, WITHOUT NEUROGENIC CLAUDICATION: ICD-10-CM

## 2021-09-28 DIAGNOSIS — Z51.81 MEDICATION MONITORING ENCOUNTER: ICD-10-CM

## 2021-09-30 DIAGNOSIS — Z51.81 MEDICATION MONITORING ENCOUNTER: ICD-10-CM

## 2021-09-30 RX ORDER — BUPROPION HYDROCHLORIDE 300 MG/1
300 TABLET ORAL EVERY MORNING
Qty: 90 TABLET | Refills: 1 | Status: SHIPPED | OUTPATIENT
Start: 2021-09-30 | End: 2022-03-29

## 2021-10-02 LAB
6-ACETYLMORPHINE, UR: NOT DETECTED
7-AMINOCLONAZEPAM, URINE: NOT DETECTED
ALPHA-OH-ALPRAZ, URINE: NOT DETECTED
ALPHA-OH-MIDAZOLAM, URINE: NOT DETECTED
ALPRAZOLAM, URINE: NOT DETECTED
AMPHETAMINES, URINE: NOT DETECTED
BARBITURATES, URINE: NOT DETECTED
BENZOYLECGONINE, UR: NOT DETECTED
BUPRENORPHINE URINE: NOT DETECTED
CARISOPRODOL, UR: NOT DETECTED
CLONAZEPAM, URINE: NOT DETECTED
CODEINE, URINE: NOT DETECTED
CREATININE URINE: 147.8 MG/DL (ref 20–400)
DIAZEPAM, URINE: NOT DETECTED
DRUGS EXPECTED, UR: NORMAL
EER HI RES INTERP UR: NORMAL
ETHYL GLUCURONIDE UR: NOT DETECTED
FENTANYL URINE: NOT DETECTED
GABAPENTIN: NOT DETECTED
HYDROCODONE, URINE: PRESENT
HYDROMORPHONE, URINE: PRESENT
LORAZEPAM, URINE: NOT DETECTED
MARIJUANA METAB, UR: NOT DETECTED
MDA, UR: NOT DETECTED
MDEA, EVE, UR: NOT DETECTED
MDMA URINE: NOT DETECTED
MEPERIDINE METAB, UR: NOT DETECTED
METHADONE, URINE: NOT DETECTED
METHAMPHETAMINE, URINE: NOT DETECTED
METHYLPHENIDATE: NOT DETECTED
MIDAZOLAM, URINE: NOT DETECTED
MORPHINE URINE: NOT DETECTED
NALOXONE URINE: NOT DETECTED
NORBUPRENORPHINE, URINE: NOT DETECTED
NORDIAZEPAM, URINE: NOT DETECTED
NORFENTANYL, URINE: NOT DETECTED
NORHYDROCODONE, URINE: PRESENT
NOROXYCODONE, URINE: NOT DETECTED
NOROXYMORPHONE, URINE: NOT DETECTED
OXAZEPAM, URINE: NOT DETECTED
OXYCODONE URINE: NOT DETECTED
OXYMORPHONE, URINE: NOT DETECTED
PAIN MANAGEMENT DRUG PANEL INTERP, URINE: NORMAL
PAIN MGT DRUG PANEL, HI RES, UR: NORMAL
PCP,URINE: NOT DETECTED
PHENTERMINE, UR: NOT DETECTED
PREGABALIN: NOT DETECTED
TAPENTADOL, URINE: NOT DETECTED
TAPENTADOL-O-SULFATE, URINE: NOT DETECTED
TEMAZEPAM, URINE: NOT DETECTED
TRAMADOL, URINE: NOT DETECTED
ZOLPIDEM METABOLITE (ZCA), URINE: NOT DETECTED
ZOLPIDEM, URINE: NOT DETECTED

## 2021-10-07 ENCOUNTER — OFFICE VISIT (OUTPATIENT)
Dept: PRIMARY CARE CLINIC | Age: 76
End: 2021-10-07
Payer: MEDICARE

## 2021-10-07 VITALS
OXYGEN SATURATION: 98 % | WEIGHT: 109.6 LBS | DIASTOLIC BLOOD PRESSURE: 66 MMHG | BODY MASS INDEX: 22.09 KG/M2 | HEART RATE: 88 BPM | SYSTOLIC BLOOD PRESSURE: 104 MMHG | HEIGHT: 59 IN

## 2021-10-07 DIAGNOSIS — Z79.899 MEDICATION MANAGEMENT: Primary | ICD-10-CM

## 2021-10-07 DIAGNOSIS — F41.9 ANXIETY: ICD-10-CM

## 2021-10-07 DIAGNOSIS — F33.41 RECURRENT MAJOR DEPRESSIVE DISORDER, IN PARTIAL REMISSION (HCC): ICD-10-CM

## 2021-10-07 DIAGNOSIS — Z23 NEED FOR VACCINATION: ICD-10-CM

## 2021-10-07 DIAGNOSIS — Z12.31 ENCOUNTER FOR SCREENING MAMMOGRAM FOR BREAST CANCER: ICD-10-CM

## 2021-10-07 PROCEDURE — 4040F PNEUMOC VAC/ADMIN/RCVD: CPT | Performed by: FAMILY MEDICINE

## 2021-10-07 PROCEDURE — G8427 DOCREV CUR MEDS BY ELIG CLIN: HCPCS | Performed by: FAMILY MEDICINE

## 2021-10-07 PROCEDURE — 90694 VACC AIIV4 NO PRSRV 0.5ML IM: CPT | Performed by: FAMILY MEDICINE

## 2021-10-07 PROCEDURE — G0008 ADMIN INFLUENZA VIRUS VAC: HCPCS | Performed by: FAMILY MEDICINE

## 2021-10-07 PROCEDURE — G8399 PT W/DXA RESULTS DOCUMENT: HCPCS | Performed by: FAMILY MEDICINE

## 2021-10-07 PROCEDURE — G8420 CALC BMI NORM PARAMETERS: HCPCS | Performed by: FAMILY MEDICINE

## 2021-10-07 PROCEDURE — 99214 OFFICE O/P EST MOD 30 MIN: CPT | Performed by: FAMILY MEDICINE

## 2021-10-07 PROCEDURE — 1123F ACP DISCUSS/DSCN MKR DOCD: CPT | Performed by: FAMILY MEDICINE

## 2021-10-07 PROCEDURE — 1090F PRES/ABSN URINE INCON ASSESS: CPT | Performed by: FAMILY MEDICINE

## 2021-10-07 PROCEDURE — 3017F COLORECTAL CA SCREEN DOC REV: CPT | Performed by: FAMILY MEDICINE

## 2021-10-07 PROCEDURE — G8484 FLU IMMUNIZE NO ADMIN: HCPCS | Performed by: FAMILY MEDICINE

## 2021-10-07 PROCEDURE — 4004F PT TOBACCO SCREEN RCVD TLK: CPT | Performed by: FAMILY MEDICINE

## 2021-10-07 ASSESSMENT — ENCOUNTER SYMPTOMS: RESPIRATORY NEGATIVE: 1

## 2021-10-07 NOTE — PATIENT INSTRUCTIONS
Patient Education        Influenza (Flu) Vaccine (Inactivated or Recombinant): What You Need to Know  Why get vaccinated? Influenza vaccine can prevent influenza (flu). Flu is a contagious disease that spreads around the [place] every year, usually between October and May. Anyone can get the flu, but it is more dangerous for some people. Infants and young children, people 72years of age and older, pregnant women, and people with certain health conditions or a weakened immune system are at greatest risk of flu complications. Pneumonia, bronchitis, sinus infections and ear infections are examples of flu-related complications. If you have a medical condition, such as heart disease, cancer or diabetes, flu can make it worse. Flu can cause fever and chills, sore throat, muscle aches, fatigue, cough, headache, and runny or stuffy nose. Some people may have vomiting and diarrhea, though this is more common in children than adults. Each year, thousands of people in the [facility] die from flu, and many more are hospitalized. Flu vaccine prevents millions of illnesses and flu-related visits to the doctor each year. Influenza vaccine  CDC recommends everyone 10months of age and older get vaccinated every flu season. Children 6 months through 6years of age may need 2 doses during a single flu season. Everyone else needs only 1 dose each flu season. It takes about 2 weeks for protection to develop after vaccination. There are many flu viruses, and they are always changing. Each year a new flu vaccine is made to protect against three or four viruses that are likely to cause disease in the upcoming flu season. Even when the vaccine doesn't exactly match these viruses, it may still provide some protection. Influenza vaccine does not cause flu. Influenza vaccine may be given at the same time as other vaccines.   Talk with your health care provider  Tell your vaccine provider if the person getting the vaccine:  · Has had an allergic reaction after a previous dose of influenza vaccine, or has any severe, life-threatening allergies. · Has ever had Guillain-Barré Syndrome (also called GBS). In some cases, your health care provider may decide to postpone influenza vaccination to a future visit. People with minor illnesses, such as a cold, may be vaccinated. People who are moderately or severely ill should usually wait until they recover before getting influenza vaccine. Your health care provider can give you more information. Risks of a vaccine reaction  · Soreness, redness, and swelling where shot is given, fever, muscle aches, and headache can happen after influenza vaccine. · There may be a very small increased risk of Guillain-Barré Syndrome (GBS) after inactivated influenza vaccine (the flu shot). Nicol Becerril children who get the flu shot along with pneumococcal vaccine (PCV13), and/or DTaP vaccine at the same time might be slightly more likely to have a seizure caused by fever. Tell your health care provider if a child who is getting flu vaccine has ever had a seizure. People sometimes faint after medical procedures, including vaccination. Tell your provider if you feel dizzy or have vision changes or ringing in the ears. As with any medicine, there is a very remote chance of a vaccine causing a severe allergic reaction, other serious injury, or death. What if there is a serious problem? An allergic reaction could occur after the vaccinated person leaves the clinic. If you see signs of a severe allergic reaction (hives, swelling of the face and throat, difficulty breathing, a fast heartbeat, dizziness, or weakness), call 9-1-1 and get the person to the nearest hospital.  For other signs that concern you, call your health care provider. Adverse reactions should be reported to the Vaccine Adverse Event Reporting System (VAERS).  Your health care provider will usually file this report, or you can do it yourself. Visit the VAERS website at www.vaers. hhs.gov or call 0-383.663.7210. VAERS is only for reporting reactions, and VAERS staff do not give medical advice. The National Vaccine Injury Compensation Program  The National Vaccine Injury Compensation Program (VICP) is a federal program that was created to compensate people who may have been injured by certain vaccines. Visit the VICP website at www.hrsa.gov/vaccinecompensation or call 4-543.790.2376 to learn about the program and about filing a claim. There is a time limit to file a claim for compensation. How can I learn more? · Ask your healthcare provider. · Call your local or state health department. · Contact the Centers for Disease Control and Prevention (CDC):  ? Call 5-331.768.5454 (1-800-CDC-INFO) or  ? Visit CDC's website at www.cdc.gov/flu  Vaccine Information Statement (Interim)  Inactivated Influenza Vaccine  8/15/2019  [address] 964VI[address]44  Department of Health and Human Services  Centers for Disease Control and Prevention  Many Vaccine Information Statements are available in [place] and other languages. See www.immunize.org/vis. Muchas hojas de información sobre vacunas están disponibles en español y en otros idiomas. Visite www.immunize.org/vis. Care instructions adapted under license by [facility] ([facility]). If you have questions about a medical condition or this instruction, always ask your healthcare professional. [place] [address] any warranty or liability for your use of this information.

## 2021-10-08 ENCOUNTER — TELEPHONE (OUTPATIENT)
Dept: PAIN MANAGEMENT | Age: 76
End: 2021-10-08

## 2021-10-08 NOTE — TELEPHONE ENCOUNTER
I returned patient's call to reschedule appointment. She states her phone isn't working and to please call an alternate number which she left. I called that number and the lady who answered the phone said Bari Guillen got her phone fixed and to call her on her own number. I called Pilar's number; went to . I left her a message to call the office.

## 2021-10-12 ENCOUNTER — HOSPITAL ENCOUNTER (OUTPATIENT)
Dept: PAIN MANAGEMENT | Age: 76
Discharge: HOME | End: 2021-10-12
Payer: MEDICARE

## 2021-10-12 DIAGNOSIS — Z51.81 MEDICATION MONITORING ENCOUNTER: ICD-10-CM

## 2021-10-12 DIAGNOSIS — Z79.899 MEDICATION MANAGEMENT: ICD-10-CM

## 2021-10-12 DIAGNOSIS — M47.817 LUMBOSACRAL SPONDYLOSIS WITHOUT MYELOPATHY: ICD-10-CM

## 2021-10-12 DIAGNOSIS — M51.37 DEGENERATION OF LUMBAR OR LUMBOSACRAL INTERVERTEBRAL DISC: ICD-10-CM

## 2021-10-12 DIAGNOSIS — M48.061 SPINAL STENOSIS, LUMBAR REGION, WITHOUT NEUROGENIC CLAUDICATION: ICD-10-CM

## 2021-10-12 DIAGNOSIS — M54.16 LUMBAR RADICULOPATHY, CHRONIC: ICD-10-CM

## 2021-10-12 PROCEDURE — 99213 OFFICE O/P EST LOW 20 MIN: CPT | Performed by: NURSE PRACTITIONER

## 2021-10-12 PROCEDURE — 99213 OFFICE O/P EST LOW 20 MIN: CPT

## 2021-10-12 RX ORDER — HYDROCODONE BITARTRATE AND ACETAMINOPHEN 5; 325 MG/1; MG/1
1 TABLET ORAL 2 TIMES DAILY PRN
Qty: 60 TABLET | Refills: 0 | Status: SHIPPED | OUTPATIENT
Start: 2021-10-13 | End: 2021-11-09 | Stop reason: SDUPTHER

## 2021-10-12 ASSESSMENT — ENCOUNTER SYMPTOMS
BACK PAIN: 1
SHORTNESS OF BREATH: 0
CONSTIPATION: 0
COUGH: 0

## 2021-10-12 NOTE — PROGRESS NOTES
Patient completed a video visit today to review medication contract. Chief Complaint: back pain    [facility]  Patient complains of back pain that radiates down her right leg. Lumbar XR shows Degenerative-type scoliosis of the lumbar spine with multilevel degenerative change. She has never had back surgery.  She has had PT and chiropractic care with mild benefit. . She has had LESI in the past with mild relief lasting one month.        Back Pain  This is a chronic problem. The current episode started more than 1 year ago. The problem occurs constantly. The problem is unchanged. The pain is present in the lumbar spine. The quality of the pain is described as aching. The pain does not radiate. The pain is at a severity of 4/10. The pain is mild. The symptoms are aggravated by position and standing (walking). Pertinent negatives include no chest pain, fever, numbness, paresthesias or tingling. She has tried analgesics, bed rest and heat for the symptoms. The treatment provided mild relief. Patient denies any new neurological symptoms. No bowel or bladder incontinence, no weakness, and no falling. Pill count: appropriate due 10/13    Morphine equivalent: 10    Periodic Controlled Substance Monitoring: Possible medication side effects, risk of tolerance/dependence & alternative treatments discussed., No signs of potential drug abuse or diversion identified., Obtaining appropriate analgesic effect of treatment.  (Wally Davis, APRN - CNP)      Past Medical History:   Diagnosis Date    Anxiety     Cancer [facility])     breast cancer, left     CKD (chronic kidney disease) stage 3, GFR 30-59 ml/min ([facility])        Past Surgical History:   Procedure Laterality Date    APPENDECTOMY      BREAST LUMPECTOMY      right fatty tissue removed    BREAST REDUCTION SURGERY Right     BREAST SURGERY Left     left mastectomy w/reconstruction, tram    CATARACT REMOVAL Bilateral 03/03/2016    CHOLECYSTECTOMY      COLONOSCOPY activity: Not on file   Other Topics Concern    Not on file   Social History Narrative    Not on file     Social Determinants of Health     Financial Resource Strain: Low Risk     Difficulty of Paying Living Expenses: Not hard at all   Food Insecurity: No Food Insecurity    Worried About Running Out of Food in the Last Year: Never true    [address] in the Last Year: Never true   Transportation Needs:     Lack of Transportation (Medical):  Lack of Transportation (Non-Medical):    Physical Activity:     Days of Exercise per Week:     Minutes of Exercise per Session:    Stress:     Feeling of Stress :    Social Connections:     Frequency of Communication with Friends and Family:     Frequency of Social Gatherings with Friends and Family:     Attends Religion Services:     Active Member of Clubs or Organizations:     Attends Club or Organization Meetings:     Marital Status:    Intimate Partner Violence:     Fear of Current or Ex-Partner:     Emotionally Abused:     Physically Abused:     Sexually Abused:        Review of Systems:  Review of Systems   Constitutional: Negative for chills and fever. Cardiovascular: Negative for chest pain and palpitations. Respiratory: Negative for cough and shortness of breath. Musculoskeletal: Positive for back pain. Gastrointestinal: Negative for constipation. Neurological: Negative for disturbances in coordination, loss of balance, numbness, paresthesias and tingling. Physical Exam:  There were no vitals taken for this visit. Physical Exam  HENT:      Head: Normocephalic. Pulmonary:      Effort: Pulmonary effort is normal.   Neurological:      Mental Status: She is alert.    Psychiatric:         Mood and Affect: Mood normal.         Behavior: Behavior normal.         Record/Diagnostics Review:    Last sharee 9/2021 and was appropriate     ABERRANT BEHAVIORS SINCE LAST VISIT  Lost rx/pills:------------------------------------------ no  Taking  medication as prescribed: ----------- yes  Urine Drug Screen ---------------------------------  yes             Date------------------------------------------------- 9/2021              Results as expected ---------------------yes     Recent ER visits: -------------------------------------No  Pill count is appropriate: ---------------------------yes   Refills for prescriptions appropriate:---------- yes    Assessment:  Problem List Items Addressed This Visit     Degeneration of lumbar or lumbosacral intervertebral disc (Chronic)    Relevant Medications    HYDROcodone-acetaminophen ([address]) 5-325 MG per tablet (Start on 10/13/2021)    Lumbosacral spondylosis without myelopathy (Chronic)    Relevant Medications    HYDROcodone-acetaminophen ([address]) 5-325 MG per tablet (Start on 10/13/2021)    Spinal stenosis, lumbar region, without neurogenic claudication (Chronic)    Relevant Medications    HYDROcodone-acetaminophen ([address]) 5-325 MG per tablet (Start on 10/13/2021)    Medication management    Relevant Medications    HYDROcodone-acetaminophen ([address]) 5-325 MG per tablet (Start on 10/13/2021)    Lumbar radiculopathy, chronic    Relevant Medications    HYDROcodone-acetaminophen (NORCO) 5-325 MG per tablet (Start on 10/13/2021)      Other Visit Diagnoses     Medication monitoring encounter        Relevant Medications    HYDROcodone-acetaminophen ([address]) 5-325 MG per tablet (Start on 10/13/2021)             Treatment Plan:  Patient relates current medications are helping the pain. Patient reports taking pain medications as prescribed, denies obtaining medications from different sources and denies use of illegal drugs. Patient denies side effects from medications like nausea, vomiting, constipation or drowsiness. Patient reports current activities of daily living are possible due to medications and would like to continue them.      As always, we encourage daily stretching and strengthening exercises, and recommend minimizing use of pain medications unless patient cannot get through daily activities due to pain. Contract requirements met. Continue opioid therapy. Script written for norco  Follow up appointment made for 4 weeks    [email or web address], was evaluated through a synchronous (real-time) audio-video encounter. The patient (or guardian if applicable) is aware that this is a billable service. Verbal consent to proceed has been obtained within the past 12 months. The visit was conducted pursuant to the emergency declaration under the [address] [place] [address], [address] [place] [address] authority and the [employer] and [employer] General Act. Patient identification was verified, and a caregiver was present when appropriate. The patient was located in a state where the provider was credentialed to provide care. Total time spent for this encounter: Not billed by time    --CHI Bunn CNP on 10/12/2021 at 9:23 AM    An electronic signature was used to authenticate this note.

## 2021-10-25 ENCOUNTER — HOSPITAL ENCOUNTER (OUTPATIENT)
Dept: CT IMAGING | Age: 76
Discharge: HOME | End: 2021-10-27
Payer: MEDICARE

## 2021-10-25 ENCOUNTER — HOSPITAL ENCOUNTER (OUTPATIENT)
Dept: WOMENS IMAGING | Age: 76
Discharge: HOME | End: 2021-10-27
Payer: MEDICARE

## 2021-10-25 DIAGNOSIS — Z87.891 PERSONAL HISTORY OF NICOTINE DEPENDENCE: ICD-10-CM

## 2021-10-25 DIAGNOSIS — Z12.31 ENCOUNTER FOR SCREENING MAMMOGRAM FOR BREAST CANCER: ICD-10-CM

## 2021-10-25 PROCEDURE — 77063 BREAST TOMOSYNTHESIS BI: CPT

## 2021-10-25 PROCEDURE — 71271 CT THORAX LUNG CANCER SCR C-: CPT

## 2021-10-28 DIAGNOSIS — Z51.81 MEDICATION MONITORING ENCOUNTER: ICD-10-CM

## 2021-10-28 DIAGNOSIS — F41.9 ANXIETY: ICD-10-CM

## 2021-10-28 RX ORDER — BUSPIRONE HYDROCHLORIDE 10 MG/1
10 TABLET ORAL 3 TIMES DAILY PRN
Qty: 90 TABLET | Refills: 1 | Status: SHIPPED | OUTPATIENT
Start: 2021-10-28 | End: 2021-11-27

## 2021-11-09 ENCOUNTER — HOSPITAL ENCOUNTER (OUTPATIENT)
Dept: PAIN MANAGEMENT | Age: 76
Discharge: HOME | End: 2021-11-09
Payer: MEDICARE

## 2021-11-09 DIAGNOSIS — M54.16 LUMBAR RADICULOPATHY, CHRONIC: ICD-10-CM

## 2021-11-09 DIAGNOSIS — Z51.81 MEDICATION MONITORING ENCOUNTER: ICD-10-CM

## 2021-11-09 DIAGNOSIS — Z79.899 MEDICATION MANAGEMENT: ICD-10-CM

## 2021-11-09 DIAGNOSIS — M48.061 SPINAL STENOSIS, LUMBAR REGION, WITHOUT NEUROGENIC CLAUDICATION: ICD-10-CM

## 2021-11-09 DIAGNOSIS — M51.37 DEGENERATION OF LUMBAR OR LUMBOSACRAL INTERVERTEBRAL DISC: ICD-10-CM

## 2021-11-09 DIAGNOSIS — M47.817 LUMBOSACRAL SPONDYLOSIS WITHOUT MYELOPATHY: ICD-10-CM

## 2021-11-09 PROCEDURE — 99212 OFFICE O/P EST SF 10 MIN: CPT | Performed by: NURSE PRACTITIONER

## 2021-11-09 PROCEDURE — 99213 OFFICE O/P EST LOW 20 MIN: CPT

## 2021-11-09 RX ORDER — HYDROCODONE BITARTRATE AND ACETAMINOPHEN 5; 325 MG/1; MG/1
1 TABLET ORAL 2 TIMES DAILY PRN
Qty: 60 TABLET | Refills: 0 | Status: SHIPPED | OUTPATIENT
Start: 2021-11-17 | End: 2021-12-22 | Stop reason: SDUPTHER

## 2021-11-09 ASSESSMENT — ENCOUNTER SYMPTOMS
BACK PAIN: 1
CONSTIPATION: 0
COUGH: 0
SHORTNESS OF BREATH: 0

## 2021-11-09 NOTE — PROGRESS NOTES
Patient completed a video visit today to review medication contract. Chief Complaint: back pain    [facility]   Patient complains of back pain that radiates down her right leg. Lumbar XR shows Degenerative-type scoliosis of the lumbar spine with multilevel degenerative change. She has never had back surgery.  She has had PT and chiropractic care with mild benefit. . She has had LESI in the past with mild relief lasting one month.        Back Pain  This is a chronic problem. The current episode started more than 1 year ago. The problem occurs constantly. The problem is unchanged. The pain is present in the lumbar spine. The quality of the pain is described as aching. Radiates to: into buttocks. The pain is at a severity of 5/10. The pain is moderate. The symptoms are aggravated by position, standing and sitting. Pertinent negatives include no chest pain, fever, numbness, paresthesias or tingling. She has tried analgesics and bed rest for the symptoms. The treatment provided mild relief. Patient denies any new neurological symptoms. No bowel or bladder incontinence, no weakness, and no falling. Pill count: appropriate due 11/17    Morphine equivalent: 10    Periodic Controlled Substance Monitoring: Possible medication side effects, risk of tolerance/dependence & alternative treatments discussed., No signs of potential drug abuse or diversion identified., Obtaining appropriate analgesic effect of treatment.  (Thomas Khanna, APRN - CNP)      Past Medical History:   Diagnosis Date    Anxiety     Breast cancer ([facility] [address] 1996    left    Cancer ([facility] [address]     breast cancer, left     CKD (chronic kidney disease) stage 3, GFR 30-59 ml/min ([facility])        Past Surgical History:   Procedure Laterality Date    APPENDECTOMY      BREAST LUMPECTOMY      right fatty tissue removed    BREAST REDUCTION SURGERY Right     BREAST SURGERY Left     left mastectomy w/reconstruction, tram    CATARACT REMOVAL Bilateral 03/03/2016  CHOLECYSTECTOMY      COLONOSCOPY      COLONOSCOPY  10/13/2016    Dr Alvares [place] TUBAL LIGATION         No Known Allergies      Current Outpatient Medications:     busPIRone (BUSPAR) 10 MG tablet, Take 1 tablet by mouth 3 times daily as needed (increased anxiety), Disp: 90 tablet, Rfl: 1    HYDROcodone-acetaminophen (NORCO) 5-325 MG per tablet, Take 1 tablet by mouth 2 times daily as needed for Pain for up to 30 days. , Disp: 60 tablet, Rfl: 0    buPROPion (WELLBUTRIN XL) 300 MG extended release tablet, TAKE 1 TABLET BY MOUTH EVERY MORNING, Disp: 90 tablet, Rfl: 1    calcitonin (MIACALCIN) 200 UNIT/ACT nasal spray, 1 spray by Nasal route daily, Disp: 3 Bottle, Rfl: 3    atorvastatin (LIPITOR) 40 MG tablet, Take 1 tablet by mouth daily, Disp: 90 tablet, Rfl: 3    Handicap Placard MISC, by Does not apply route Cannot walk 200 feet due to orthopedic/back pain Expires in 5 years 4/22/26, Disp: 1 each, Rfl: 0    citalopram (CELEXA) 20 MG tablet, Take 1 tablet by mouth daily, Disp: 90 tablet, Rfl: 0    aspirin (ASPIRIN CHILDRENS) 81 MG chewable tablet, Take 1 tablet by mouth daily, Disp: 30 tablet, Rfl: 3    Acetaminophen (TYLENOL 8 HOUR ARTHRITIS PAIN PO), Take by mouth, Disp: , Rfl:     Multiple Vitamins-Minerals (MULTIVITAMIN ADULT PO), Take by mouth, Disp: , Rfl:     Calcium Carb-Cholecalciferol (CALCIUM + D3) 600-200 MG-UNIT TABS tablet, Take 1 tablet by mouth Daily with lunch, Disp: , Rfl:     Family History   Problem Relation Age of Onset    Heart Disease Father        Social History     Socioeconomic History    Marital status:      Spouse name: Not on file    Number of children: 2    Years of education: 15    Highest education level: Not on file   Occupational History    Occupation: retired   Tobacco Use    Smoking status: Current Every Day Smoker     Packs/day: 1.00     Years: 32.00     Pack years: 32.00    Smokeless tobacco: Never Used    Tobacco comment: 1 pack a day   Vaping Use  Vaping Use: Former    Substances: Occasionally   Substance and Sexual Activity    Alcohol use: Yes     Comment: occ. wine or rum.  Drug use: No    Sexual activity: Not on file   Other Topics Concern    Not on file   Social History Narrative    Not on file     Social Determinants of Health     Financial Resource Strain: Low Risk     Difficulty of Paying Living Expenses: Not hard at all   Food Insecurity: No Food Insecurity    Worried About Running Out of Food in the Last Year: Never true    [address] in the Last Year: Never true   Transportation Needs:     Lack of Transportation (Medical): Not on file    Lack of Transportation (Non-Medical): Not on file   Physical Activity:     Days of Exercise per Week: Not on file    Minutes of Exercise per Session: Not on file   Stress:     Feeling of Stress : Not on file   Social Connections:     Frequency of Communication with Friends and Family: Not on file    Frequency of Social Gatherings with Friends and Family: Not on file    Attends Gnosticist Services: Not on file    Active Member of [address] [place] [address] or Organizations: Not on file    Attends Club or Organization Meetings: Not on file    Marital Status: Not on file   Intimate Partner Violence:     Fear of Current or Ex-Partner: Not on file    Emotionally Abused: Not on file    Physically Abused: Not on file    Sexually Abused: Not on file   Housing Stability:     Unable to Pay for Housing in the Last Year: Not on file    Number of [place] in the Last Year: Not on file    Unstable Housing in the Last Year: Not on file       Review of Systems:  Review of Systems   Constitutional: Negative for chills and fever. Cardiovascular: Negative for chest pain and palpitations. Respiratory: Negative for cough and shortness of breath. Musculoskeletal: Positive for back pain. Gastrointestinal: Negative for constipation.    Neurological: Negative for disturbances in coordination, loss of balance, numbness, paresthesias and tingling. Physical Exam:  There were no vitals taken for this visit. Physical Exam  HENT:      Head: Normocephalic. Pulmonary:      Effort: Pulmonary effort is normal.   Neurological:      Mental Status: She is alert.    Psychiatric:         Mood and Affect: Mood normal.         Behavior: Behavior normal.         Record/Diagnostics Review:    Last sharee 9/2021 and was appropriate    ABERRANT BEHAVIORS SINCE LAST VISIT  Lost rx/pills:------------------------------------------ no  Taking  medication as prescribed: ----------- yes  Urine Drug Screen ---------------------------------Gopi Grapes             Date------------------------------------------------- 9/2021              Results as expected ---------------------yes     Recent ER visits: -------------------------------------No  Pill count is appropriate: ---------------------------yes   Refills for prescriptions appropriate:---------- yes        Assessment:  Problem List Items Addressed This Visit     Degeneration of lumbar or lumbosacral intervertebral disc (Chronic)    Relevant Medications    HYDROcodone-acetaminophen ([address]) 5-325 MG per tablet (Start on 11/17/2021)    Lumbosacral spondylosis without myelopathy (Chronic)    Relevant Medications    HYDROcodone-acetaminophen ([address]) 5-325 MG per tablet (Start on 11/17/2021)    Spinal stenosis, lumbar region, without neurogenic claudication (Chronic)    Relevant Medications    HYDROcodone-acetaminophen ([address]) 5-325 MG per tablet (Start on 11/17/2021)    Medication monitoring encounter    Relevant Medications    HYDROcodone-acetaminophen (NORCO) 5-325 MG per tablet (Start on 11/17/2021)    Lumbar radiculopathy, chronic    Relevant Medications    HYDROcodone-acetaminophen (NORCO) 5-325 MG per tablet (Start on 11/17/2021)      Other Visit Diagnoses     Medication management        Relevant Medications    HYDROcodone-acetaminophen ([address]) 5-325 MG per tablet (Start on 11/17/2021) Treatment Plan:  Patient relates current medications are helping the pain. Patient reports taking pain medications as prescribed, denies obtaining medications from different sources and denies use of illegal drugs. Patient denies side effects from medications like nausea, vomiting, constipation or drowsiness. Patient reports current activities of daily living are possible due to medications and would like to continue them. As always, we encourage daily stretching and strengthening exercises, and recommend minimizing use of pain medications unless patient cannot get through daily activities due to pain. Contract requirements met. Continue opioid therapy. Script written for norco  Follow up appointment made for 4 weeks    [employer], was evaluated through a synchronous (real-time) audio-video encounter. The patient (or guardian if applicable) is aware that this is a billable service. Verbal consent to proceed has been obtained within the past 12 months. The visit was conducted pursuant to the emergency declaration under the [address] [place] [address], [address] [place] [address] authority and the [employer] General Act. Patient identification was verified, and a caregiver was present when appropriate. The patient was located in a state where the provider was credentialed to provide care. Total time spent for this encounter: Not billed by time    --CHI Wade CNP on 11/9/2021 at 10:57 AM    An electronic signature was used to authenticate this note.

## 2021-12-01 ENCOUNTER — APPOINTMENT (RX ONLY)
Dept: URBAN - METROPOLITAN AREA CLINIC 148 | Facility: CLINIC | Age: 76
Setting detail: DERMATOLOGY
End: 2021-12-01

## 2021-12-01 DIAGNOSIS — D18.0 HEMANGIOMA: ICD-10-CM

## 2021-12-01 DIAGNOSIS — L81.4 OTHER MELANIN HYPERPIGMENTATION: ICD-10-CM

## 2021-12-01 DIAGNOSIS — D22 MELANOCYTIC NEVI: ICD-10-CM

## 2021-12-01 DIAGNOSIS — Z87.2 PERSONAL HISTORY OF DISEASES OF THE SKIN AND SUBCUTANEOUS TISSUE: ICD-10-CM

## 2021-12-01 DIAGNOSIS — L82.1 OTHER SEBORRHEIC KERATOSIS: ICD-10-CM

## 2021-12-01 DIAGNOSIS — D69.2 OTHER NONTHROMBOCYTOPENIC PURPURA: ICD-10-CM

## 2021-12-01 PROBLEM — D22.5 MELANOCYTIC NEVI OF TRUNK: Status: ACTIVE | Noted: 2021-12-01

## 2021-12-01 PROBLEM — D18.01 HEMANGIOMA OF SKIN AND SUBCUTANEOUS TISSUE: Status: ACTIVE | Noted: 2021-12-01

## 2021-12-01 PROCEDURE — ? ADDITIONAL NOTES

## 2021-12-01 PROCEDURE — ? SUNSCREEN RECOMMENDATIONS

## 2021-12-01 PROCEDURE — 99213 OFFICE O/P EST LOW 20 MIN: CPT

## 2021-12-01 PROCEDURE — ? COUNSELING

## 2021-12-01 PROCEDURE — ? FULL BODY SKIN EXAM

## 2021-12-01 ASSESSMENT — LOCATION ZONE DERM
LOCATION ZONE: LEG
LOCATION ZONE: FACE
LOCATION ZONE: ARM
LOCATION ZONE: TRUNK

## 2021-12-01 ASSESSMENT — LOCATION DETAILED DESCRIPTION DERM
LOCATION DETAILED: LEFT INFERIOR CENTRAL MALAR CHEEK
LOCATION DETAILED: PERIUMBILICAL SKIN
LOCATION DETAILED: RIGHT PROXIMAL PRETIBIAL REGION
LOCATION DETAILED: LEFT PROXIMAL POSTERIOR UPPER ARM
LOCATION DETAILED: LEFT PROXIMAL PRETIBIAL REGION
LOCATION DETAILED: LEFT MEDIAL SUPERIOR CHEST
LOCATION DETAILED: LEFT PROXIMAL DORSAL FOREARM
LOCATION DETAILED: RIGHT PROXIMAL DORSAL FOREARM
LOCATION DETAILED: RIGHT SUPERIOR MEDIAL MIDBACK
LOCATION DETAILED: RIGHT ANTERIOR DISTAL THIGH
LOCATION DETAILED: RIGHT SUPERIOR MEDIAL UPPER BACK
LOCATION DETAILED: RIGHT INFERIOR MEDIAL MIDBACK
LOCATION DETAILED: LEFT ANTERIOR DISTAL THIGH
LOCATION DETAILED: RIGHT DISTAL POSTERIOR UPPER ARM

## 2021-12-01 ASSESSMENT — LOCATION SIMPLE DESCRIPTION DERM
LOCATION SIMPLE: ABDOMEN
LOCATION SIMPLE: LEFT FOREARM
LOCATION SIMPLE: RIGHT LOWER BACK
LOCATION SIMPLE: RIGHT POSTERIOR UPPER ARM
LOCATION SIMPLE: RIGHT PRETIBIAL REGION
LOCATION SIMPLE: RIGHT UPPER BACK
LOCATION SIMPLE: LEFT THIGH
LOCATION SIMPLE: RIGHT THIGH
LOCATION SIMPLE: RIGHT FOREARM
LOCATION SIMPLE: LEFT PRETIBIAL REGION
LOCATION SIMPLE: CHEST
LOCATION SIMPLE: LEFT POSTERIOR UPPER ARM
LOCATION SIMPLE: LEFT CHEEK

## 2021-12-07 RX ORDER — BUSPIRONE HYDROCHLORIDE 10 MG/1
TABLET ORAL
COMMUNITY
End: 2022-01-03

## 2021-12-08 ENCOUNTER — HOSPITAL ENCOUNTER (OUTPATIENT)
Dept: PAIN MANAGEMENT | Age: 76
Discharge: HOME | End: 2021-12-08
Payer: MEDICARE

## 2021-12-08 DIAGNOSIS — M54.16 LUMBAR RADICULOPATHY, CHRONIC: ICD-10-CM

## 2021-12-08 DIAGNOSIS — Z79.899 MEDICATION MANAGEMENT: ICD-10-CM

## 2021-12-08 DIAGNOSIS — M48.061 SPINAL STENOSIS, LUMBAR REGION, WITHOUT NEUROGENIC CLAUDICATION: Chronic | ICD-10-CM

## 2021-12-08 DIAGNOSIS — M47.817 LUMBOSACRAL SPONDYLOSIS WITHOUT MYELOPATHY: Chronic | ICD-10-CM

## 2021-12-08 DIAGNOSIS — M51.37 DEGENERATION OF LUMBAR OR LUMBOSACRAL INTERVERTEBRAL DISC: Primary | Chronic | ICD-10-CM

## 2021-12-08 PROCEDURE — 99213 OFFICE O/P EST LOW 20 MIN: CPT

## 2021-12-08 PROCEDURE — 99213 OFFICE O/P EST LOW 20 MIN: CPT | Performed by: NURSE PRACTITIONER

## 2021-12-08 RX ORDER — GABAPENTIN 100 MG/1
100 CAPSULE ORAL 2 TIMES DAILY
Qty: 60 CAPSULE | Refills: 0 | Status: SHIPPED | OUTPATIENT
Start: 2021-12-08 | End: 2022-05-06 | Stop reason: SDUPTHER

## 2021-12-08 ASSESSMENT — ENCOUNTER SYMPTOMS
BACK PAIN: 1
GASTROINTESTINAL NEGATIVE: 1
RESPIRATORY NEGATIVE: 1
EYES NEGATIVE: 1

## 2021-12-08 NOTE — PROGRESS NOTES
[facility] PROGRESS NOTE      Patient  completed [x]  video visit   []   phone call:         Minutes :       [x]    to  review Medication Agreement    []  Follow up after procedure   []  Discuss treatment options      Location:  Provider:  working from    [x]    home    []   [facility] ,   patient at  home       Chief Complaint: low back pain      She c/o low back pain radiating down right leg. Her pain has not increased. . She has no history of lumbar surgery, She reports injection is the past did not help her pain. She reports her sleep varies. She has not been as active the last few weeks due to back pain. She states some issues with balnce due to right leg pain. Back Pain  This is a chronic problem. The problem occurs constantly. The problem has been gradually worsening since onset. The pain is present in the lumbar spine. Quality: sharp. Radiates to: right leg. The pain is at a severity of 6/10. The pain is moderate. The pain is the same all the time. Exacerbated by: walking. (Issue with balance) Risk factors include history of osteoporosis and menopause. She has tried analgesics, bed rest and heat for the symptoms.            Treatment goals:  Functional status:  Get rif of right buttock pain      Aberrancy:   Any alcoholic beverages     no       Any illegal drugs   no    Analgesia:   6                  Adverse  Effects  no      ADL;s : not as active last few weeks    Data:    When was thelast UDS:    9-      Was the UDS appropriate:  [] yes []   no      Record/Diagnostics Review:      As above, I did review the imaging       DRUG SCREEN, PAIN  Order: [id number]        Ref Range & Units 9/28/21 1015 Resulting Agency   Pain Management Drug Panel Interp, Urine  Consistent  [facility]   Comment: (NOTE)   ________________________________________________________________   DRUGS EXPECTED:   NORCO (HYDROCODONE) [TODAY]   ________________________________________________________________ CONSISTENT with medications provided:   NORCO (HYDROCODONE) : based on hydrocodone, norhydrocodone,   hydromorphone   ________________________________________________________________   Drugs Not Included in this Assay:   Acetaminophen   ________________________________________________________________   INTERPRETIVE INFORMATION: Targeted drug profile Interp   Interpretation depends on accuracy and completeness of patient   medication information submitted by client. 6-Acetylmorphine, Ur  Not Detected            EXAMINATION:   3 XRAY VIEWS OF THE LUMBAR SPINE       10/15/2019 11:08 am       COMPARISON:   None.       HISTORY:   ORDERING SYSTEM PROVIDED HISTORY: Degeneration of lumbar or lumbosacral   intervertebral disc   TECHNOLOGIST PROVIDED HISTORY:   pain   Reason for Exam: Pt states low back pain. Many years.  Pt has scoliosis. Acuity: Unknown   Type of Exam: Unknown       FINDINGS:   Levoscoliosis of the lumbar spine.  Scoliosis measures 37 degrees from L1   through L3.  Multilevel degenerative changes with disc height loss and facet   arthropathy.  Intact spinous processes.  Vascular calcifications.           Impression   Degenerative-type scoliosis of the lumbar spine with multilevel degenerative   change.                     Pill count: appropriate    fill date : 12-    Morphine equivalent dose as reported on OARRS:  10  Periodic Controlled Substance Monitoring: Possible medication side effects, risk of tolerance/dependence & alternative treatments discussed., No signs of potential drug abuse or diversion identified. , Assessed functional status., Obtaining appropriate analgesic effect of treatment. Nohemy Pierson APRN - CNP)  Review ofOARRS does not show any aberrant prescription behavior. Medication is helping the patient stay active. Patient denies any side effects and reports adequate analgesia. No sign of misuse/abuse.             Past Medical History:   Diagnosis Date    Anxiety     Breast cancer [facility]) 1996    left    Cancer ([facility] [address]     breast cancer, left     CKD (chronic kidney disease) stage 3, GFR 30-59 ml/min ([facility])        Past Surgical History:   Procedure Laterality Date    APPENDECTOMY      BREAST LUMPECTOMY      right fatty tissue removed    BREAST REDUCTION SURGERY Right     BREAST SURGERY Left     left mastectomy w/reconstruction, tram    CATARACT REMOVAL Bilateral 03/03/2016    CHOLECYSTECTOMY      COLONOSCOPY      COLONOSCOPY  10/13/2016    Dr Areli Tamez         No Known Allergies      Current Outpatient Medications:     busPIRone (BUSPAR) 10 MG tablet, , Disp: , Rfl:     HYDROcodone-acetaminophen (NORCO) 5-325 MG per tablet, Take 1 tablet by mouth 2 times daily as needed for Pain for up to 30 days. , Disp: 60 tablet, Rfl: 0    buPROPion (WELLBUTRIN XL) 300 MG extended release tablet, TAKE 1 TABLET BY MOUTH EVERY MORNING, Disp: 90 tablet, Rfl: 1    calcitonin (MIACALCIN) 200 UNIT/ACT nasal spray, 1 spray by Nasal route daily, Disp: 3 Bottle, Rfl: 3    atorvastatin (LIPITOR) 40 MG tablet, Take 1 tablet by mouth daily, Disp: 90 tablet, Rfl: 3    Handicap Placard MISC, by Does not apply route Cannot walk 200 feet due to orthopedic/back pain Expires in 5 years 4/22/26, Disp: 1 each, Rfl: 0    citalopram (CELEXA) 20 MG tablet, Take 1 tablet by mouth daily, Disp: 90 tablet, Rfl: 0    aspirin (ASPIRIN CHILDRENS) 81 MG chewable tablet, Take 1 tablet by mouth daily, Disp: 30 tablet, Rfl: 3    Acetaminophen (TYLENOL 8 HOUR ARTHRITIS PAIN PO), Take by mouth, Disp: , Rfl:     Multiple Vitamins-Minerals (MULTIVITAMIN ADULT PO), Take by mouth, Disp: , Rfl:     Calcium Carb-Cholecalciferol (CALCIUM + D3) 600-200 MG-UNIT TABS tablet, Take 1 tablet by mouth Daily with lunch, Disp: , Rfl:     Family History   Problem Relation Age of Onset    Heart Disease Father        Social History     Socioeconomic History    Marital status:      Spouse name: Not on file    Constitutional: Negative. HENT: Negative. Eyes: Negative. Cardiovascular: Negative. Respiratory: Negative. Endocrine: Negative. Hematologic/Lymphatic: Bruises/bleeds easily. Skin: Negative. Musculoskeletal: Positive for back pain and joint pain. Hips   Gastrointestinal: Negative. Genitourinary: Negative. Neurological: Positive for loss of balance. Psychiatric/Behavioral: Negative. Physical Exam:  There were no vitals taken for this visit. Physical Exam      Assessment:      Problem List Items Addressed This Visit     Spinal stenosis, lumbar region, without neurogenic claudication (Chronic)    Medication management    Lumbosacral spondylosis without myelopathy (Chronic)    Lumbar radiculopathy, chronic    Relevant Medications    busPIRone (BUSPAR) 10 MG tablet    Degeneration of lumbar or lumbosacral intervertebral disc - Primary (Chronic)          Treatment Plan:  DISCUSSION: Treatment options discussed withpatient and all questions answered to patient's satisfaction. Possible side effects, risk of tolerance and or dependence and alternative treatments discussed    Obtaining appropriate analgesic effect of treatment   No signs of potential drug abuse or diversion identified    [x] Ill effects of being on chronic pain medications such as sleep disturbances, respiratory depression, hormonal changes, withdrawal symptoms, chronic opioid dependence and tolerance as well as risk of taking opioids with Benzodiazepines and taking opioids along with alcohol,  werediscussed with patient. I had asked the patient to minimize medication use and utilize pain medications only for uncontrolled rest pain or pain with exertional activities. I advised patient not to self-escalate painmedications without consulting with us.   At each of patient's future visits we will try to taper pain medications, while adjusting the adjunct medications, and re-evaluating for Physical Therapy to improve spinal andjoint strength. We will continue to have discussions to decrease pain medications as tolerated. Counseled patient on effects their pain medication and /or their medical condition mayhave on their  ability to drive or operate machinery. Instructed not to drive or operate machinery if drowsy     I also discussed with the patient regarding the dangers of combining narcotic pain medication with tranquilizers, alcohol or illegal drugs or taking the medication any way other than prescribed. The dangers were discussed  including respiratory depression and death. Patient was told to tell  all  physicians regarding the medications he is getting from pain clinic. Patient is warned not to take any unprescribed medications over-the-countermedications that can depress breathing . Patient is advised to talk to the pharmacist or physicians if planning to take any over-the-counter medications before  takeing them. Patient is strongly advised to avoid tranquilizers or  relaxants, illegal drugs  or any medications that can depress breathing  Patient is also advised to tell us if there is any changes in their medications from other physicians. 1. Will add gabapentin 100 mg bid to help with sciatic pain    2.  She was instructed to call for her next refill on hydrocodone as she is not due until 12-        TREATMENT OPTIONS:       Medication Agreement Requirements Met  Continue Opioid therapy  Script written for  gabapentin  Follow up appointment made

## 2021-12-22 DIAGNOSIS — M51.37 DEGENERATION OF LUMBAR OR LUMBOSACRAL INTERVERTEBRAL DISC: ICD-10-CM

## 2021-12-22 DIAGNOSIS — M54.16 LUMBAR RADICULOPATHY, CHRONIC: ICD-10-CM

## 2021-12-22 DIAGNOSIS — M47.817 LUMBOSACRAL SPONDYLOSIS WITHOUT MYELOPATHY: ICD-10-CM

## 2021-12-22 DIAGNOSIS — Z51.81 MEDICATION MONITORING ENCOUNTER: ICD-10-CM

## 2021-12-22 DIAGNOSIS — Z79.899 MEDICATION MANAGEMENT: ICD-10-CM

## 2021-12-22 DIAGNOSIS — M48.061 SPINAL STENOSIS, LUMBAR REGION, WITHOUT NEUROGENIC CLAUDICATION: ICD-10-CM

## 2021-12-22 RX ORDER — HYDROCODONE BITARTRATE AND ACETAMINOPHEN 5; 325 MG/1; MG/1
1 TABLET ORAL 2 TIMES DAILY PRN
Qty: 60 TABLET | Refills: 0 | Status: SHIPPED | OUTPATIENT
Start: 2021-12-30 | End: 2022-01-29

## 2022-03-29 DIAGNOSIS — Z51.81 MEDICATION MONITORING ENCOUNTER: ICD-10-CM

## 2022-03-29 RX ORDER — BUPROPION HYDROCHLORIDE 300 MG/1
300 TABLET ORAL EVERY MORNING
Qty: 90 TABLET | Refills: 1 | Status: SHIPPED | OUTPATIENT
Start: 2022-03-29 | End: 2022-09-26

## 2022-03-29 NOTE — TELEPHONE ENCOUNTER
LAST VISIT:   10/7/2021     Future Appointments   Date Time Provider Brisa Gregorio   4/29/2022  3:00 PM MD SAVANNA Welsh

## 2022-05-06 ENCOUNTER — HOSPITAL ENCOUNTER (OUTPATIENT)
Dept: PAIN MANAGEMENT | Age: 77
Discharge: HOME | End: 2022-05-06
Payer: MEDICARE

## 2022-05-06 VITALS
OXYGEN SATURATION: 96 % | TEMPERATURE: 97.3 F | HEART RATE: 86 BPM | SYSTOLIC BLOOD PRESSURE: 129 MMHG | DIASTOLIC BLOOD PRESSURE: 61 MMHG

## 2022-05-06 DIAGNOSIS — Z79.899 MEDICATION MANAGEMENT: ICD-10-CM

## 2022-05-06 DIAGNOSIS — M47.817 LUMBOSACRAL SPONDYLOSIS WITHOUT MYELOPATHY: Chronic | ICD-10-CM

## 2022-05-06 DIAGNOSIS — Z51.81 MEDICATION MONITORING ENCOUNTER: ICD-10-CM

## 2022-05-06 DIAGNOSIS — M54.16 LUMBAR RADICULOPATHY, CHRONIC: ICD-10-CM

## 2022-05-06 DIAGNOSIS — M48.061 SPINAL STENOSIS, LUMBAR REGION, WITHOUT NEUROGENIC CLAUDICATION: Chronic | ICD-10-CM

## 2022-05-06 DIAGNOSIS — M51.37 DEGENERATION OF LUMBAR OR LUMBOSACRAL INTERVERTEBRAL DISC: Primary | Chronic | ICD-10-CM

## 2022-05-06 PROCEDURE — 99213 OFFICE O/P EST LOW 20 MIN: CPT | Performed by: NURSE PRACTITIONER

## 2022-05-06 PROCEDURE — 99213 OFFICE O/P EST LOW 20 MIN: CPT

## 2022-05-06 RX ORDER — GABAPENTIN 100 MG/1
100 CAPSULE ORAL 2 TIMES DAILY
Qty: 60 CAPSULE | Refills: 0 | Status: SHIPPED | OUTPATIENT
Start: 2022-05-06 | End: 2022-06-07

## 2022-05-06 RX ORDER — HYDROCODONE BITARTRATE AND ACETAMINOPHEN 5; 325 MG/1; MG/1
1 TABLET ORAL 2 TIMES DAILY PRN
Qty: 60 TABLET | Refills: 0 | Status: SHIPPED | OUTPATIENT
Start: 2022-05-06 | End: 2022-06-02 | Stop reason: SDUPTHER

## 2022-05-06 ASSESSMENT — ENCOUNTER SYMPTOMS
BOWEL INCONTINENCE: 0
BACK PAIN: 1

## 2022-05-06 NOTE — PROGRESS NOTES
Chief Complaint   Patient presents with    Back Pain    Medication Refill       [facility]     Patient complains of back pain that radiates down her  legs at times with prolonged activity and standing. Lumbar XR shows Degenerative-type scoliosis of the lumbar spine with multilevel degenerative change. She has never had back surgery.  She has had PT and chiropractic care with mild benefit. . She has had LESI in the past with mild relief lasting one month.  has not been seen in our office since jan due to being in [place]. Did have 2 months of PM&R  3 months of PT while there and found it helpful. Continues with HEP. States she had \"plasma injection\" in her back and found it helpful somewhat. HPI:     Back Pain  This is a chronic problem. The current episode started more than 1 year ago. The problem occurs constantly. The problem is unchanged. The pain is present in the lumbar spine. The quality of the pain is described as shooting. The pain radiates to the left foot, left thigh, left knee, right foot, right knee and right thigh. The pain is at a severity of 4/10. The pain is mild. The pain is worse during the day. The symptoms are aggravated by bending, standing and twisting. Stiffness is present in the morning. Associated symptoms include bladder incontinence. Pertinent negatives include no bowel incontinence, chest pain, fever, headaches, numbness, tingling or weakness. She has tried chiropractic manipulation, home exercises, analgesics, ice and heat (physical therapy) for the symptoms. The treatment provided mild relief. Patient denies any new neurological symptoms. No bowel or bladder incontinence, no weakness, and no falling.     Pill count: appropriate none last filled jan    Morphine equivalent: 10      Controlled Substance Monitoring:    Acute and Chronic Pain Monitoring:   RX Monitoring 5/6/2022   Attestation -   Acute Pain Prescriptions -   Periodic Controlled Substance Monitoring No signs of potential drug abuse or diversion identified. ;Possible medication side effects, risk of tolerance/dependence & alternative treatments discussed. ;Assessed functional status. ;Obtaining appropriate analgesic effect of treatment. Chronic Pain > 50 MEDD -         Periodic Controlled Substance Monitoring: No signs of potential drug abuse or diversion identified. ,Possible medication side effects, risk of tolerance/dependence & alternative treatments discussed. ,Assessed functional status. ,Obtaining appropriate analgesic effect of treatment. Sarah Lao, APRN - CNP)      Past Medical History:   Diagnosis Date    Anxiety     Breast cancer [facility]) 1996    left    Cancer ([facility] [address]     breast cancer, left     CKD (chronic kidney disease) stage 3, GFR 30-59 ml/min ([facility])        Past Surgical History:   Procedure Laterality Date    APPENDECTOMY      BREAST LUMPECTOMY      right fatty tissue removed    BREAST REDUCTION SURGERY Right     BREAST SURGERY Left     left mastectomy w/reconstruction, tram    CATARACT REMOVAL Bilateral 03/03/2016    CHOLECYSTECTOMY      COLONOSCOPY      COLONOSCOPY  10/13/2016    Dr Rose Santamaria         No Known Allergies      Current Outpatient Medications:     gabapentin (NEURONTIN) 100 MG capsule, Take 1 capsule by mouth 2 times daily for 30 days. , Disp: 60 capsule, Rfl: 0    HYDROcodone-acetaminophen (NORCO) 5-325 MG per tablet, Take 1 tablet by mouth 2 times daily as needed for Pain for up to 30 days. , Disp: 60 tablet, Rfl: 0    buPROPion (WELLBUTRIN XL) 300 MG extended release tablet, TAKE 1 TABLET BY MOUTH EVERY MORNING, Disp: 90 tablet, Rfl: 1    busPIRone (BUSPAR) 10 MG tablet, Take 1 tablet by mouth 3 times daily, Disp: 90 tablet, Rfl: 3    calcitonin (MIACALCIN) 200 UNIT/ACT nasal spray, 1 spray by Nasal route daily, Disp: 3 Bottle, Rfl: 3    atorvastatin (LIPITOR) 40 MG tablet, Take 1 tablet by mouth daily, Disp: 90 tablet, Rfl: 3    Handicap Placard MISC, by Does not apply route Cannot walk 200 feet due to orthopedic/back pain Expires in 5 years 4/22/26, Disp: 1 each, Rfl: 0    citalopram (CELEXA) 20 MG tablet, Take 1 tablet by mouth daily, Disp: 90 tablet, Rfl: 0    aspirin (ASPIRIN CHILDRENS) 81 MG chewable tablet, Take 1 tablet by mouth daily, Disp: 30 tablet, Rfl: 3    Acetaminophen (TYLENOL 8 HOUR ARTHRITIS PAIN PO), Take by mouth, Disp: , Rfl:     Multiple Vitamins-Minerals (MULTIVITAMIN ADULT PO), Take by mouth, Disp: , Rfl:     Calcium Carb-Cholecalciferol (CALCIUM + D3) 600-200 MG-UNIT TABS tablet, Take 1 tablet by mouth Daily with lunch, Disp: , Rfl:     Family History   Problem Relation Age of Onset    Heart Disease Father        Social History     Socioeconomic History    Marital status:      Spouse name: Not on file    Number of children: 2    Years of education: 15    Highest education level: Not on file   Occupational History    Occupation: retired   Tobacco Use    Smoking status: Current Every Day Smoker     Packs/day: 1.00     Years: 32.00     Pack years: 32.00    Smokeless tobacco: Never Used    Tobacco comment: 1 pack a day   Vaping Use    Vaping Use: Former    Substances: Occasionally   Substance and Sexual Activity    Alcohol use: Yes     Comment: occ. wine or rum.  Drug use: No    Sexual activity: Not on file   Other Topics Concern    Not on file   Social History Narrative    Not on file     Social Determinants of Health     Financial Resource Strain: Low Risk     Difficulty of Paying Living Expenses: Not hard at all   Food Insecurity: No Food Insecurity    Worried About Running Out of Food in the Last Year: Never true    [address] in the Last Year: Never true   Transportation Needs:     Lack of Transportation (Medical): Not on file    Lack of Transportation (Non-Medical):  Not on file   Physical Activity:     Days of Exercise per Week: Not on file    Minutes of Exercise per Session: Not on file   Stress:     Feeling of Stress : Not on file   Social Connections:     Frequency of Communication with Friends and Family: Not on file    Frequency of Social Gatherings with Friends and Family: Not on file    Attends Bahai Services: Not on file    Active Member of Clubs or Organizations: Not on file    Attends Club or Organization Meetings: Not on file    Marital Status: Not on file   Intimate Partner Violence:     Fear of Current or Ex-Partner: Not on file    Emotionally Abused: Not on file    Physically Abused: Not on file    Sexually Abused: Not on file   Housing Stability:     Unable to Pay for Housing in the Last Year: Not on file    Number of [place] in the Last Year: Not on file    Unstable Housing in the Last Year: Not on file       Review of Systems:  Review of Systems   Constitutional: Negative for fever. Cardiovascular: Negative for chest pain. Musculoskeletal: Positive for back pain. Gastrointestinal: Negative for bowel incontinence. Genitourinary: Positive for bladder incontinence. Neurological: Negative for headaches, numbness, tingling and weakness. Physical Exam:  /61   Pulse 86   Temp 97.3 °F (36.3 °C)   SpO2 96%     Physical Exam  Musculoskeletal:      Lumbar back: Decreased range of motion.               Assessment:  Problem List Items Addressed This Visit     Degeneration of lumbar or lumbosacral intervertebral disc - Primary (Chronic)    Relevant Medications    gabapentin (NEURONTIN) 100 MG capsule    HYDROcodone-acetaminophen (NORCO) 5-325 MG per tablet    Other Relevant Orders    MRI LUMBAR SPINE WO CONTRAST    Lumbosacral spondylosis without myelopathy (Chronic)    Relevant Medications    gabapentin (NEURONTIN) 100 MG capsule    HYDROcodone-acetaminophen (NORCO) 5-325 MG per tablet    Spinal stenosis, lumbar region, without neurogenic claudication (Chronic)    Relevant Medications    gabapentin (NEURONTIN) 100 MG capsule    HYDROcodone-acetaminophen (NORCO) 5-325 MG per tablet    Other Relevant Orders    MRI LUMBAR SPINE WO CONTRAST    Medication management    Relevant Medications    HYDROcodone-acetaminophen (NORCO) 5-325 MG per tablet    Lumbar radiculopathy, chronic    Relevant Medications    gabapentin (NEURONTIN) 100 MG capsule    HYDROcodone-acetaminophen (NORCO) 5-325 MG per tablet      Other Visit Diagnoses     Medication monitoring encounter        Relevant Medications    HYDROcodone-acetaminophen (NORCO) 5-325 MG per tablet             Treatment Plan:  Patient relates current medications are helping the pain. Patient reports taking pain medications as prescribed, denies obtaining medications from different sources and denies use of illegal drugs. Patient denies side effects from medications like nausea, vomiting, constipation or drowsiness. Patient reports current activities of daily living are possible due to medications and would like to continue them. As always, we encourage daily stretching and strengthening exercises, and recommend minimizing use of pain medications unless patient cannot get through daily activities due to pain. Contract requirements met. Continue opioid therapy. Script written for noco  Lumbar MRI ordered today to further evaluate pathology and guide treatment plan  Follow up appointment made for 4 weeks    I have reviewed the chief complaint and history of present illness (including ROS and PFSH) and vital documentation by my staff and I agree with their documentation and have added where applicable.

## 2022-05-09 ENCOUNTER — OFFICE VISIT (OUTPATIENT)
Dept: PRIMARY CARE CLINIC | Age: 77
End: 2022-05-09
Payer: MEDICARE

## 2022-05-09 VITALS
WEIGHT: 113.4 LBS | HEIGHT: 59 IN | HEART RATE: 67 BPM | SYSTOLIC BLOOD PRESSURE: 128 MMHG | OXYGEN SATURATION: 100 % | DIASTOLIC BLOOD PRESSURE: 72 MMHG | BODY MASS INDEX: 22.86 KG/M2

## 2022-05-09 DIAGNOSIS — F33.41 RECURRENT MAJOR DEPRESSIVE DISORDER, IN PARTIAL REMISSION (HCC): ICD-10-CM

## 2022-05-09 DIAGNOSIS — Z51.81 MEDICATION MONITORING ENCOUNTER: ICD-10-CM

## 2022-05-09 DIAGNOSIS — N18.30 STAGE 3 CHRONIC KIDNEY DISEASE, UNSPECIFIED WHETHER STAGE 3A OR 3B CKD (HCC): ICD-10-CM

## 2022-05-09 DIAGNOSIS — M54.16 LUMBAR RADICULOPATHY, CHRONIC: ICD-10-CM

## 2022-05-09 DIAGNOSIS — F17.200 SMOKER: ICD-10-CM

## 2022-05-09 DIAGNOSIS — F41.9 ANXIETY: ICD-10-CM

## 2022-05-09 DIAGNOSIS — L60.9 NAIL ABNORMALITIES: ICD-10-CM

## 2022-05-09 DIAGNOSIS — M70.72 BURSITIS OF LEFT HIP, UNSPECIFIED BURSA: Primary | ICD-10-CM

## 2022-05-09 DIAGNOSIS — D50.8 OTHER IRON DEFICIENCY ANEMIA: ICD-10-CM

## 2022-05-09 DIAGNOSIS — F17.210 CIGARETTE NICOTINE DEPENDENCE WITHOUT COMPLICATION: ICD-10-CM

## 2022-05-09 DIAGNOSIS — E78.5 HYPERLIPIDEMIA, UNSPECIFIED HYPERLIPIDEMIA TYPE: ICD-10-CM

## 2022-05-09 DIAGNOSIS — G47.00 INSOMNIA, UNSPECIFIED TYPE: ICD-10-CM

## 2022-05-09 PROBLEM — M46.1 SACROILIITIS (HCC): Status: RESOLVED | Noted: 2022-05-09 | Resolved: 2022-05-09

## 2022-05-09 PROBLEM — M46.1 SACROILIITIS (HCC): Status: ACTIVE | Noted: 2022-05-09

## 2022-05-09 LAB
MAGNESIUM: 2.1 MG/DL (ref 1.6–2.6)
VITAMIN B-12: 631 PG/ML (ref 232–1245)

## 2022-05-09 PROCEDURE — 1090F PRES/ABSN URINE INCON ASSESS: CPT | Performed by: FAMILY MEDICINE

## 2022-05-09 PROCEDURE — 99214 OFFICE O/P EST MOD 30 MIN: CPT | Performed by: FAMILY MEDICINE

## 2022-05-09 PROCEDURE — 4004F PT TOBACCO SCREEN RCVD TLK: CPT | Performed by: FAMILY MEDICINE

## 2022-05-09 PROCEDURE — G8420 CALC BMI NORM PARAMETERS: HCPCS | Performed by: FAMILY MEDICINE

## 2022-05-09 PROCEDURE — 4040F PNEUMOC VAC/ADMIN/RCVD: CPT | Performed by: FAMILY MEDICINE

## 2022-05-09 PROCEDURE — G8399 PT W/DXA RESULTS DOCUMENT: HCPCS | Performed by: FAMILY MEDICINE

## 2022-05-09 PROCEDURE — 1123F ACP DISCUSS/DSCN MKR DOCD: CPT | Performed by: FAMILY MEDICINE

## 2022-05-09 PROCEDURE — G8427 DOCREV CUR MEDS BY ELIG CLIN: HCPCS | Performed by: FAMILY MEDICINE

## 2022-05-09 RX ORDER — NICOTINE 21 MG/24HR
1 PATCH, TRANSDERMAL 24 HOURS TRANSDERMAL EVERY 24 HOURS
Qty: 21 PATCH | Refills: 0 | Status: SHIPPED | OUTPATIENT
Start: 2022-05-09 | End: 2022-10-21

## 2022-05-09 ASSESSMENT — PATIENT HEALTH QUESTIONNAIRE - PHQ9
SUM OF ALL RESPONSES TO PHQ9 QUESTIONS 1 & 2: 1
3. TROUBLE FALLING OR STAYING ASLEEP: 1
7. TROUBLE CONCENTRATING ON THINGS, SUCH AS READING THE NEWSPAPER OR WATCHING TELEVISION: 1
6. FEELING BAD ABOUT YOURSELF - OR THAT YOU ARE A FAILURE OR HAVE LET YOURSELF OR YOUR FAMILY DOWN: 1
SUM OF ALL RESPONSES TO PHQ QUESTIONS 1-9: 5
SUM OF ALL RESPONSES TO PHQ QUESTIONS 1-9: 5
9. THOUGHTS THAT YOU WOULD BE BETTER OFF DEAD, OR OF HURTING YOURSELF: 0
8. MOVING OR SPEAKING SO SLOWLY THAT OTHER PEOPLE COULD HAVE NOTICED. OR THE OPPOSITE, BEING SO FIGETY OR RESTLESS THAT YOU HAVE BEEN MOVING AROUND A LOT MORE THAN USUAL: 0
5. POOR APPETITE OR OVEREATING: 0
2. FEELING DOWN, DEPRESSED OR HOPELESS: 1
SUM OF ALL RESPONSES TO PHQ QUESTIONS 1-9: 5
4. FEELING TIRED OR HAVING LITTLE ENERGY: 1
1. LITTLE INTEREST OR PLEASURE IN DOING THINGS: 0
SUM OF ALL RESPONSES TO PHQ QUESTIONS 1-9: 5
10. IF YOU CHECKED OFF ANY PROBLEMS, HOW DIFFICULT HAVE THESE PROBLEMS MADE IT FOR YOU TO DO YOUR WORK, TAKE CARE OF THINGS AT HOME, OR GET ALONG WITH OTHER PEOPLE: 1

## 2022-05-09 ASSESSMENT — ENCOUNTER SYMPTOMS
COUGH: 1
BACK PAIN: 1

## 2022-05-09 NOTE — PROGRESS NOTES
[facility] PRIMARY CARE  [address] [place] [address] B  [address] [id number]  Dept: Misty Rkluciana. Carolin. is a 69 y.o. female Established patient, who presents today for her medical conditions/complaintsas noted below. Chief Complaint   Patient presents with    Incontinence     Pt wants to talk about incontinence and c/o about nial ridges.  Nail Problem       HPI:     HPI    Bladder incontinence: with coughing and laughing, seems like a lot even after just going to the [place]. Worse x 4 months. discussed options, she doesn't want meds right now. Nail : more ridging and breaking off. X 6 months. Left hip pain, back pain. Is getting an MRI lumbar. Depression under control  Sleeps ok:wakes up about 1  Per night.      Smoking 1 ppd    Reviewed prior notes None  Reviewed previous Labs    LDL Cholesterol (mg/dL)   Date Value   04/28/2021 56     LDL Calculated (mg/dL)   Date Value   06/13/2017 96       (goal LDL is <100)   AST (U/L)   Date Value   04/28/2021 16     ALT (U/L)   Date Value   04/28/2021 12     BUN (mg/dL)   Date Value   10/01/2020 21     TSH (mIU/L)   Date Value   04/22/2021 1.68     BP Readings from Last 3 Encounters:   05/09/22 128/72   05/06/22 129/61   10/07/21 104/66          (goal 120/80)    Past Medical History:   Diagnosis Date    Anxiety     Breast cancer ([facility] [address] 1996    left    Cancer ([facility] [address]     breast cancer, left     CKD (chronic kidney disease) stage 3, GFR 30-59 ml/min ([facility])       Past Surgical History:   Procedure Laterality Date    APPENDECTOMY      BREAST LUMPECTOMY      right fatty tissue removed    BREAST REDUCTION SURGERY Right     BREAST SURGERY Left     left mastectomy w/reconstruction, tram    CATARACT REMOVAL Bilateral 03/03/2016    CHOLECYSTECTOMY      COLONOSCOPY      COLONOSCOPY  10/13/2016    Dr Loyda Bergman    TUBAL LIGATION         Family History   Problem Relation Age of Onset    Heart Disease Father        Social History     Tobacco Use    Smoking status: Current Every Day Smoker     Packs/day: 1.00     Years: 32.00     Pack years: 32.00    Smokeless tobacco: Never Used    Tobacco comment: 1 pack a day   Substance Use Topics    Alcohol use: Yes     Comment: occ. wine or rum. Current Outpatient Medications   Medication Sig Dispense Refill    nicotine (NICODERM CQ) 21 MG/24HR Place 1 patch onto the skin every 24 hours for 21 days 21 patch 0    gabapentin (NEURONTIN) 100 MG capsule Take 1 capsule by mouth 2 times daily for 30 days. 60 capsule 0    HYDROcodone-acetaminophen (NORCO) 5-325 MG per tablet Take 1 tablet by mouth 2 times daily as needed for Pain for up to 30 days. 60 tablet 0    buPROPion (WELLBUTRIN XL) 300 MG extended release tablet TAKE 1 TABLET BY MOUTH EVERY MORNING 90 tablet 1    busPIRone (BUSPAR) 10 MG tablet Take 1 tablet by mouth 3 times daily 90 tablet 3    calcitonin (MIACALCIN) 200 UNIT/ACT nasal spray 1 spray by Nasal route daily 3 Bottle 3    atorvastatin (LIPITOR) 40 MG tablet Take 1 tablet by mouth daily 90 tablet 3    Handicap Placard MISC by Does not apply route Cannot walk 200 feet due to orthopedic/back pain  Expires in 5 years 4/22/26 1 each 0    citalopram (CELEXA) 20 MG tablet Take 1 tablet by mouth daily 90 tablet 0    aspirin (ASPIRIN CHILDRENS) 81 MG chewable tablet Take 1 tablet by mouth daily 30 tablet 3    Acetaminophen (TYLENOL 8 HOUR ARTHRITIS PAIN PO) Take by mouth      Multiple Vitamins-Minerals (MULTIVITAMIN ADULT PO) Take by mouth      Calcium Carb-Cholecalciferol (CALCIUM + D3) 600-200 MG-UNIT TABS tablet Take 1 tablet by mouth Daily with lunch       No current facility-administered medications for this visit.      No Known Allergies    Health Maintenance   Topic Date Due    Annual Wellness Visit (AWV)  Never done    Potassium  10/01/2021    Creatinine  10/01/2021    COVID-19 Vaccine (3 - Booster for Pfizer series) 10/28/2021    Depression Monitoring 04/22/2022    Lipids  04/28/2022    Low dose CT lung screening  10/25/2022    DTaP/Tdap/Td vaccine (2 - Td or Tdap) 10/01/2025    DEXA (modify frequency per FRAX score)  Completed    Flu vaccine  Completed    Shingles vaccine  Completed    Pneumococcal 65+ years Vaccine  Completed    Hepatitis C screen  Completed    Hepatitis A vaccine  Aged Out    Hepatitis B vaccine  Aged Out    Hib vaccine  Aged Out    Meningococcal (ACWY) vaccine  Aged Out       Subjective:      Review of Systems   Constitutional: Negative. Respiratory: Positive for cough. Musculoskeletal: Positive for arthralgias and back pain. Objective:     /72   Pulse 67   Ht 4' 11\" (1.499 m)   Wt 113 lb 6.4 oz (51.4 kg)   SpO2 100%   BMI 22.90 kg/m²   Physical Exam  Vitals and nursing note reviewed. Constitutional:       General: She is not in acute distress. Appearance: She is well-developed. She is not ill-appearing. HENT:      Head: Normocephalic and atraumatic. Right Ear: External ear normal.      Left Ear: External ear normal.   Eyes:      General: No scleral icterus. Right eye: No discharge. Left eye: No discharge. Conjunctiva/sclera: Conjunctivae normal.   Neck:      Thyroid: No thyromegaly. Trachea: No tracheal deviation. Cardiovascular:      Rate and Rhythm: Normal rate and regular rhythm. Heart sounds: Normal heart sounds. Pulmonary:      Effort: Pulmonary effort is normal. No respiratory distress. Breath sounds: Normal breath sounds. No wheezing. Musculoskeletal:      Right lower leg: No edema. Left lower leg: No edema. Comments: Fingernails are dry, multiple small ridges there is breakage at the end with some of the notches being a V-shaped in the distal nails   Lymphadenopathy:      Cervical: No cervical adenopathy. Skin:     General: Skin is warm. Findings: No rash.    Neurological:      Mental Status: She is alert and oriented to person, place, and time. Psychiatric:         Mood and Affect: Mood normal.         Behavior: Behavior normal.         Thought Content: Thought content normal.         Assessment:       Diagnosis Orders   1. Bursitis of left hip, unspecified bursa     2. Medication monitoring encounter  Comprehensive Metabolic Panel, Fasting    Lipid Panel   3. Nail abnormalities  Magnesium    Zinc    Vitamin B12   4. Recurrent major depressive disorder, in partial remission ([facility] [address]     5. Hyperlipidemia, unspecified hyperlipidemia type  Comprehensive Metabolic Panel, Fasting    Lipid Panel   6. Insomnia, unspecified type     7. Stage 3 chronic kidney disease, unspecified whether stage 3a or 3b CKD (HCC)  Comprehensive Metabolic Panel, Fasting   8. Lumbar radiculopathy, chronic     9. Smoker  nicotine (NICODERM CQ) 21 MG/24HR   10. Cigarette nicotine dependence without complication  nicotine (NICODERM CQ) 21 MG/24HR        Plan:      No follow-ups on file. Hip exercises  Discussed stopping smoking and patches. Discussed treatment for urine incontinence including wearing pads, and Kegel exercises, pushing on the bladder area while urinating to try to empty completely. Does not want to be on pills. Follow-up with pain management.     Orders Placed This Encounter   Procedures    Comprehensive Metabolic Panel, Fasting     Standing Status:   Future     Standing Expiration Date:   5/9/2023    Lipid Panel     Standing Status:   Future     Standing Expiration Date:   5/9/2023     Order Specific Question:   Is Patient Fasting?/# of Hours     Answer:   yes    Magnesium     Standing Status:   Future     Standing Expiration Date:   5/9/2023    Zinc     Standing Status:   Future     Standing Expiration Date:   5/9/2023    Vitamin B12     Standing Status:   Future     Standing Expiration Date:   5/9/2023     Orders Placed This Encounter   Medications    nicotine (NICODERM CQ) 21 MG/24HR     Sig: Place 1 patch onto the skin every 24 hours for 21 days     Dispense:  21 patch     Refill:  0       Patient given educationalmaterials - see patient instructions. Discussed use, benefit, and side effectsof prescribed medications. All patient questions answered. Pt voiced understanding. Reviewed health maintenance. Instructed to continue current medications, diet andexercise. Patient agreed with treatment plan. Follow up as directed.      Electronicallysigned by Kye Ojeda MD on 5/9/2022 at 11:33 AM

## 2022-05-09 NOTE — PATIENT INSTRUCTIONS
Patient Education        Kegel Exercises: Care Instructions  Overview     Kegel exercises strengthen muscles around the bladder. These muscles control the flow of urine. Kegel exercises are sometime called \"pelvic floor\" exercises. They can help prevent urine leakage and keep the pelvic organs inplace. Kegel exercises can strengthen pelvic muscles that have been weakened by age, pregnancy, childbirth, and surgery. They may help prevent or treat urineleakage. You do Kegel exercises by tightening the muscles you use when you urinate. Ede Tanner likely need to do these exercises for several weeks to get better. Follow-up care is a key part of your treatment and safety. Be sure to make and go to all appointments, and call your doctor if you are having problems. It's also a good idea to know your test results and keep alist of the medicines you take. How can you care for yourself at home?  Do Kegel exercises. ? Find the muscles you need to strengthen. To do this, tighten the muscles that stop your urine while you are going to the bathroom. These are the same muscles you squeeze during Kegel exercises. ? Squeeze the muscles as hard as you can. Your belly and thighs should not move. ? Hold the squeeze for 3 seconds. Then relax for 3 seconds. ? Start with 3 seconds, and then add 1 second each week until you are able to squeeze for 10 seconds. ? Repeat the exercise 10 to 15 times for each session. Do three or more sessions each day.  You can check to see if you are using the right muscles. ? Tighten the muscles that help you stop passing gas or keep you from urinating. Make sure you aren't using your stomach, leg, or buttock muscles. ? Place a finger in your vagina and squeeze around it. You are doing them right when you feel pressure around your finger. Your doctor may also suggest that you put special weights in your vagina while you do the exercises.    Check with your doctor if you don't notice a difference after trying these exercises for several weeks. Your doctor may suggest getting help from a physical therapist or recommend other treatment. Where can you learn more? Go to [email or web address] and sign in to your [facility] account. Enter W628 in the [employer] box to learn more about \"Kegel Exercises: Care Instructions. \"     If you do not have an account, please click on the \"Sign Up Now\" link. Current as of: October 18, 2021               Content Version: 13.2  © 2006-2022 [employer] Care instructions adapted under license by [facility] ([facility]). If you have questions about a medical condition or this instruction, always ask your healthcare professional. [address] any warranty or liability for your use of this information. Patient Education        Bladder Training: Care Instructions  Your Care Instructions     Bladder training is used to treat urge incontinence and stress incontinence. Urge incontinence means that the need to urinate comes on so fast that you can't get to a toilet in time. Stress incontinence means that you leak urine because of pressure on your bladder. For example, it may happen when you laugh,cough, or lift something heavy. Bladder training can increase how long you can wait before you have to urinate. It can also help your bladder hold more urine. And it can give you bettercontrol over the urge to urinate. It is important to remember that bladder training takes a few weeks to a few months to make a difference. You may not see results right away, but don't giveup. Follow-up care is a key part of your treatment and safety. Be sure to make and go to all appointments, and call your doctor if you are having problems. It's also a good idea to know your test results and keep alist of the medicines you take. How can you care for yourself at home?   Work with your doctor to come up with a bladder training program that is rightfor you. You may use one or more of the following methods. Delayed urination   In the beginning, try to keep from urinating for 5 minutes after you first feel the need to go.  While you wait, take deep, slow breaths to relax. Kegel exercises can also help you delay the need to go to the bathroom.  After some practice, when you can easily wait 5 minutes to urinate, try to wait 10 minutes before you urinate.  Slowly increase the waiting period until you are able to control when you have to urinate. Scheduled urination   Empty your bladder when you first wake up in the morning.  Schedule times throughout the day when you will urinate.  Start by going to the bathroom every hour, even if you don't need to go.  Slowly increase the time between trips to the bathroom.  When you have found a schedule that works well for you, keep doing it.  If you wake up during the night and have to urinate, do it. Apply your schedule to waking hours only. Kegel exercises  These tighten and strengthen pelvic muscles, which can help you control theflow of urine. To do Kegel exercises:   Squeeze the same muscles you would use to stop your urine. Your belly and thighs should not move.  Hold the squeeze for 3 seconds, and then relax for 3 seconds.  Start with 3 seconds. Then add 1 second each week until you are able to squeeze for 10 seconds.  Repeat the exercise 10 to 15 times a session. Do three or more sessions a day. When should you call for help? Watch closely for changes in your health, and be sure to contact your doctor if:     Your incontinence is getting worse.      You do not get better as expected. Where can you learn more? Go to [email or web address] and sign in to your [employer] account. Enter N352 in the [employer] box to learn more about \"Bladder Training: Care Instructions. \"     If you do not have an account, please click on the \"Sign Up Now\" link.   Current as of: October 18, 2021               Content Version: 13.2  © 2006-2022 [employer]. Care instructions adapted under license by [facility] ([facility]). If you have questions about a medical condition or this instruction, always ask your healthcare professional. [address] any warranty or liability for your use of this information. Patient Education        Hip Bursitis: Care Instructions  Your Care Instructions     Bursitis is inflammation of the bursa. A bursa is a small sac of fluid that cushions a joint and helps it move easily. A bursa sits between a bone in the hip and the muscles and tendons in the thigh and buttock. Injury or overuse of the hip can cause bursitis. Activities that can lead to bursitis includetwisting and rapid joint movement. Bursitis can cause hip pain. Bursitis usually gets better if you avoid the activity that caused it. If pain lasts or gets worse despite home treatment, your doctor may draw fluid from the bursa through a needle. This may relieve your pain and help your doctor know if you have an infection. If so, your doctor will prescribe antibiotics. If you have inflammation only, you may get a corticosteroid shot to reduce swellingand pain. Sometimes surgery is needed to drain or remove the bursa. Follow-up care is a key part of your treatment and safety. Be sure to make and go to all appointments, and call your doctor if you are having problems. It's also a good idea to know your test results and keep alist of the medicines you take. How can you care for yourself at home?  Put ice or a cold pack on your hip for 10 to 20 minutes at a time. Put a thin cloth between the ice and your skin.  After 3 days of using ice, you may use heat on your hip. You can use a hot water bottle, a heating pad set on low, or a warm, moist towel.  Rest your hip. Stop any activities that cause pain. Switch to activities that do not stress your hip.    Take your medicines exactly as prescribed. Call your doctor if you think you are having a problem with your medicine.  Ask your doctor if you can take an over-the-counter pain medicine, such as acetaminophen (Tylenol), ibuprofen (Advil, Motrin), or naproxen (Aleve). Be safe with medicines. Read and follow all instructions on the label.  To prevent stiffness, gently move the hip joint as much as you can without pain every day. As the pain gets better, keep doing range-of-motion exercises. Ask your doctor for exercises that will make the muscles around the hip joint stronger. Do these as directed.  You can slowly return to the activity that caused the pain, but do it with less effort until you can do it without pain or swelling. Be sure to warm up before and stretch after you do the activity. When should you call for help? Call your doctor now or seek immediate medical care if:     You have a fever.      You have increased swelling or redness in your hip.      You cannot use your hip, or the pain in your hip gets worse. Watch closely for changes in your health, and be sure to contact your doctor if:     You have pain for 2 weeks or longer despite home treatment. Where can you learn more? Go to https://RippldpeFNZewFoxtrot.[email or web address] org and sign in to your [employer] account. Enter S429 in the klinify box to learn more about \"Hip Bursitis: Care Instructions. \"     If you do not have an account, please click on the \"Sign Up Now\" link. Current as of: July 1, 2021               Content Version: 13.2  © 2006-2022 Healthwise, Incorporated. Care instructions adapted under license by [facility] ([facility]). If you have questions about a medical condition or this instruction, always ask your healthcare professional. [place] [address] any warranty or liability for your use of this information.          Patient Education        Hip Bursitis: Exercises  Introduction  Here are some examples of exercises for you to try. The exercises may be suggested for a condition or for rehabilitation. Start each exercise slowly. Ease off the exercises if you start to have pain. You will be told when to start these exercises and which ones will work bestfor you. How to do the exercises  Hip rotator stretch    1. Lie on your back with both knees bent and your feet flat on the floor. 2. Put the ankle of your affected leg on your opposite thigh near your knee. 3. Use your hand to gently push your knee away from your body until you feel a gentle stretch around your hip. 4. Hold the stretch for 15 to 30 seconds. 5. Repeat 2 to 4 times. 6. Repeat steps 1 through 5, but this time use your hand to gently pull your knee toward your opposite shoulder. Iliotibial band stretch    1. Lean sideways against a wall. If you are not steady on your feet, hold on to a chair or counter. 2. Stand on the leg with the affected hip, with that leg close to the wall. Then cross your other leg in front of it. 3. Let your affected hip drop out to the side of your body and against wall. Then lean away from your affected hip until you feel a stretch. 4. Hold the stretch for 15 to 30 seconds. 5. Repeat 2 to 4 times. Straight-leg raises to the outside    1. Lie on your side, with your affected hip on top. 2. Tighten the front thigh muscles of your top leg to keep your knee straight. 3. Keep your hip and your leg straight in line with the rest of your body, and keep your knee pointing forward. Do not drop your hip back. 4. Lift your top leg straight up toward the ceiling, about 12 inches off the floor. Hold for about 6 seconds, then slowly lower your leg. 5. Repeat 8 to 12 times. Clamshell    1. Lie on your side, with your affected hip on top and your head propped on a pillow. Keep your feet and knees together and your knees bent. 2. Raise your top knee, but keep your feet together. Do not let your hips roll back.  Your legs should open up like a alejandrina. 3. Hold for 6 seconds. 4. Slowly lower your knee back down. Rest for 10 seconds. 5. Repeat 8 to 12 times. Follow-up care is a key part of your treatment and safety. Be sure to make and go to all appointments, and call your doctor if you are having problems. It's also a good idea to know your test results and keep alist of the medicines you take. Where can you learn more? Go to [email or web address] and sign in to your rVue account. Enter P500 in the [employer] box to learn more about \"Hip Bursitis: Exercises. \"     If you do not have an account, please click on the \"Sign Up Now\" link. Current as of: July 1, 2021               Content Version: 13.2  © 1334-3192 Healthwise, Incorporated. Care instructions adapted under license by [facility] ([facility]). If you have questions about a medical condition or this instruction, always ask your healthcare professional. [address] any warranty or liability for your use of this information.

## 2022-05-12 LAB — ZINC: 65.6 UG/DL (ref 60–120)

## 2022-05-13 ENCOUNTER — HOSPITAL ENCOUNTER (OUTPATIENT)
Dept: MRI IMAGING | Age: 77
Discharge: HOME | End: 2022-05-15
Payer: MEDICARE

## 2022-05-13 ENCOUNTER — HOSPITAL ENCOUNTER (OUTPATIENT)
Age: 77
Discharge: HOME | End: 2022-05-13
Payer: MEDICARE

## 2022-05-13 DIAGNOSIS — M51.37 DEGENERATION OF LUMBAR OR LUMBOSACRAL INTERVERTEBRAL DISC: Chronic | ICD-10-CM

## 2022-05-13 DIAGNOSIS — N18.30 STAGE 3 CHRONIC KIDNEY DISEASE, UNSPECIFIED WHETHER STAGE 3A OR 3B CKD (HCC): ICD-10-CM

## 2022-05-13 DIAGNOSIS — M48.061 SPINAL STENOSIS, LUMBAR REGION, WITHOUT NEUROGENIC CLAUDICATION: Chronic | ICD-10-CM

## 2022-05-13 DIAGNOSIS — E78.5 HYPERLIPIDEMIA, UNSPECIFIED HYPERLIPIDEMIA TYPE: ICD-10-CM

## 2022-05-13 DIAGNOSIS — Z51.81 MEDICATION MONITORING ENCOUNTER: ICD-10-CM

## 2022-05-13 LAB
ALBUMIN SERPL-MCNC: 4.1 G/DL (ref 3.5–5.2)
ALP BLD-CCNC: 114 U/L (ref 35–104)
ALT SERPL-CCNC: 18 U/L (ref 5–33)
ANION GAP SERPL CALCULATED.3IONS-SCNC: 9 MMOL/L (ref 9–17)
AST SERPL-CCNC: 21 U/L
BILIRUB SERPL-MCNC: <0.15 MG/DL (ref 0.3–1.2)
BUN BLDV-MCNC: 24 MG/DL (ref 8–23)
CALCIUM SERPL-MCNC: 9.2 MG/DL (ref 8.6–10.4)
CHLORIDE BLD-SCNC: 110 MMOL/L (ref 98–107)
CHOLESTEROL/HDL RATIO: 1.9
CHOLESTEROL: 133 MG/DL
CO2: 24 MMOL/L (ref 20–31)
CREAT SERPL-MCNC: 1.54 MG/DL (ref 0.5–0.9)
GFR AFRICAN AMERICAN: 40 ML/MIN
GFR NON-AFRICAN AMERICAN: 33 ML/MIN
GFR SERPL CREATININE-BSD FRML MDRD: ABNORMAL ML/MIN/{1.73_M2}
GLUCOSE FASTING: 116 MG/DL (ref 70–99)
HDLC SERPL-MCNC: 69 MG/DL
LDL CHOLESTEROL: 55 MG/DL (ref 0–130)
POTASSIUM SERPL-SCNC: 5.3 MMOL/L (ref 3.7–5.3)
SODIUM BLD-SCNC: 143 MMOL/L (ref 135–144)
TOTAL PROTEIN: 6.6 G/DL (ref 6.4–8.3)
TRIGL SERPL-MCNC: 47 MG/DL

## 2022-05-13 PROCEDURE — 36415 COLL VENOUS BLD VENIPUNCTURE: CPT

## 2022-05-13 PROCEDURE — 80061 LIPID PANEL: CPT

## 2022-05-13 PROCEDURE — 72148 MRI LUMBAR SPINE W/O DYE: CPT

## 2022-05-13 PROCEDURE — 80053 COMPREHEN METABOLIC PANEL: CPT

## 2022-05-16 NOTE — RESULT ENCOUNTER NOTE
Lipids good. BUN, creatinine: a little worse. Is she drinking enough fluids ? Alk phos a little above normal, but not concerning. Rest of labs ok.

## 2022-05-25 DIAGNOSIS — M81.0 MENOPAUSAL OSTEOPOROSIS: ICD-10-CM

## 2022-05-25 DIAGNOSIS — I70.90 ATHEROSCLEROSIS: Chronic | ICD-10-CM

## 2022-05-26 RX ORDER — CALCITONIN SALMON 200 [IU]/.09ML
SPRAY, METERED NASAL
Qty: 11.1 ML | OUTPATIENT
Start: 2022-05-26

## 2022-05-26 RX ORDER — CALCITONIN SALMON 200 [IU]/.09ML
SPRAY, METERED NASAL
Qty: 11.1 ML | Refills: 2 | Status: SHIPPED | OUTPATIENT
Start: 2022-05-26

## 2022-05-26 RX ORDER — ATORVASTATIN CALCIUM 40 MG/1
40 TABLET, FILM COATED ORAL DAILY
Qty: 90 TABLET | Refills: 3 | Status: SHIPPED | OUTPATIENT
Start: 2022-05-26

## 2022-06-02 ENCOUNTER — HOSPITAL ENCOUNTER (OUTPATIENT)
Dept: PAIN MANAGEMENT | Age: 77
Discharge: HOME | End: 2022-06-02
Payer: MEDICARE

## 2022-06-02 VITALS
OXYGEN SATURATION: 99 % | HEART RATE: 78 BPM | WEIGHT: 118 LBS | BODY MASS INDEX: 23.16 KG/M2 | TEMPERATURE: 97.3 F | HEIGHT: 60 IN | RESPIRATION RATE: 16 BRPM

## 2022-06-02 DIAGNOSIS — M54.16 LUMBAR RADICULOPATHY, CHRONIC: ICD-10-CM

## 2022-06-02 DIAGNOSIS — M54.16 LEFT LUMBAR RADICULITIS: ICD-10-CM

## 2022-06-02 DIAGNOSIS — M51.37 DEGENERATION OF LUMBAR OR LUMBOSACRAL INTERVERTEBRAL DISC: Chronic | ICD-10-CM

## 2022-06-02 DIAGNOSIS — M54.51 VERTEBROGENIC LOW BACK PAIN: Chronic | ICD-10-CM

## 2022-06-02 DIAGNOSIS — M47.817 LUMBOSACRAL SPONDYLOSIS WITHOUT MYELOPATHY: Primary | Chronic | ICD-10-CM

## 2022-06-02 DIAGNOSIS — M48.061 SPINAL STENOSIS, LUMBAR REGION, WITHOUT NEUROGENIC CLAUDICATION: Chronic | ICD-10-CM

## 2022-06-02 DIAGNOSIS — Z51.81 MEDICATION MONITORING ENCOUNTER: ICD-10-CM

## 2022-06-02 DIAGNOSIS — Z79.899 MEDICATION MANAGEMENT: ICD-10-CM

## 2022-06-02 DIAGNOSIS — M41.27 OTHER IDIOPATHIC SCOLIOSIS, LUMBOSACRAL REGION: Chronic | ICD-10-CM

## 2022-06-02 PROCEDURE — 99213 OFFICE O/P EST LOW 20 MIN: CPT

## 2022-06-02 PROCEDURE — 99215 OFFICE O/P EST HI 40 MIN: CPT | Performed by: ANESTHESIOLOGY

## 2022-06-02 RX ORDER — HYDROCODONE BITARTRATE AND ACETAMINOPHEN 5; 325 MG/1; MG/1
1 TABLET ORAL 2 TIMES DAILY PRN
Qty: 60 TABLET | Refills: 0 | Status: SHIPPED | OUTPATIENT
Start: 2022-06-09 | End: 2022-07-14 | Stop reason: SDUPTHER

## 2022-06-02 ASSESSMENT — ENCOUNTER SYMPTOMS
RESPIRATORY NEGATIVE: 1
BACK PAIN: 1
EYES NEGATIVE: 1

## 2022-06-02 ASSESSMENT — PAIN SCALES - GENERAL: PAINLEVEL_OUTOF10: 8

## 2022-06-02 NOTE — PROGRESS NOTES
The patient is a 69 y. o. Non- / non  female. Chief Complaint   Patient presents with    Back Pain    Medication Refill        Back Pain  Pertinent negatives include no fever. -79-year-old woman with history of chronic low back pain  Back pain is onset many years ago located in the lower lumbar area is a constant aching throbbing pain that is present at rest aggravated with excessive activity  No previous lumbar spine surgical history  Have tried conservative measures with therapy and chiropractic treatments  On chronic opioid therapy with Norco 5 mg twice a day find it helpful but pain is inadequately controlled  Reports no side effect  Denies any illicit substance use  Had recent flareup of back pain with left-sided sciatica located over the left buttock and radiating down all the way to the foot associated with intermittent left leg numbness  No changes in bladder or bowel control  Had recent MRI lumbar spine here for review of the imaging and discuss treatment option  Have the epidural injection in past, last injection was 2 years ago  Medication Refill: Norco    Pain score Today:  8  Adverse effects (Constipation / Nausea / Sedation / sexual Dysfunction / others) :  none  Mood: fair  Sleep pattern and quality: fair  Activity level: good    Pill count Today: 10  Last dose taken 06/02/2022 morning  OARRS report reviewed today: yes  ER/Hospitalizations/PCP visit related to pain since last visit:no   Any legal problems e.g. DUI etc.:No  Satisfied with current management: Yes    Opioid Contract: 09/17/2020  Last Urine Dug screen dated: 09/28/2021    No results found for: LABA1C  No results found for: EAG    Past Medical History, Past Surgical History, Social History, Allergies and Medications reviewed and updated in EPIC as indicated    Family History reviewed and is noncontributory.         Past Medical History:   Diagnosis Date    Anxiety     Breast cancer ([facility] [address] 1996    left    Cancer [facility])     breast cancer, left     CKD (chronic kidney disease) stage 3, GFR 30-59 ml/min ([facility])         Past Surgical History:   Procedure Laterality Date    APPENDECTOMY      BREAST LUMPECTOMY      right fatty tissue removed    BREAST REDUCTION SURGERY Right     BREAST SURGERY Left     left mastectomy w/reconstruction, tram    CATARACT REMOVAL Bilateral 03/03/2016    CHOLECYSTECTOMY      COLONOSCOPY      COLONOSCOPY  10/13/2016    Dr Velez Drivers History     Socioeconomic History    Marital status:      Spouse name: None    Number of children: 2    Years of education: 15    Highest education level: None   Occupational History    Occupation: retired   Tobacco Use    Smoking status: Current Every Day Smoker     Packs/day: 1.00     Years: 32.00     Pack years: 32.00    Smokeless tobacco: Never Used    Tobacco comment: 1 pack a day   Vaping Use    Vaping Use: Former    Substances: Occasionally   Substance and Sexual Activity    Alcohol use: Yes     Comment: occ. wine or rum.  Drug use: No    Sexual activity: None   Other Topics Concern    None   Social History Narrative    None     Social Determinants of Health     Financial Resource Strain: Low Risk     Difficulty of Paying Living Expenses: Not hard at all   Food Insecurity: No Food Insecurity    Worried About Running Out of Food in the Last Year: Never true    Alix of Food in the Last Year: Never true   Transportation Needs:     Lack of Transportation (Medical): Not on file    Lack of Transportation (Non-Medical):  Not on file   Physical Activity:     Days of Exercise per Week: Not on file    Minutes of Exercise per Session: Not on file   Stress:     Feeling of Stress : Not on file   Social Connections:     Frequency of Communication with Friends and Family: Not on file    Frequency of Social Gatherings with Friends and Family: Not on file    Attends Anglican Services: Not on file   Maeve Macdonald Member of Clubs or Organizations: Not on file    Attends Club or Organization Meetings: Not on file    Marital Status: Not on file   Intimate Partner Violence:     Fear of Current or Ex-Partner: Not on file    Emotionally Abused: Not on file    Physically Abused: Not on file    Sexually Abused: Not on file   Housing Stability:     Unable to Pay for Housing in the Last Year: Not on file    Number of [place] in the Last Year: Not on file    Unstable Housing in the Last Year: Not on file       Family History   Problem Relation Age of Onset    Heart Disease Father        No Known Allergies    Vitals:    06/02/22 1054   Pulse: 78   Resp: 16   Temp: 97.3 °F (36.3 °C)   SpO2: 99%       Current Outpatient Medications   Medication Sig Dispense Refill    [START ON 6/9/2022] HYDROcodone-acetaminophen (NORCO) 5-325 MG per tablet Take 1 tablet by mouth 2 times daily as needed for Pain for up to 30 days. 60 tablet 0    atorvastatin (LIPITOR) 40 MG tablet TAKE 1 TABLET BY MOUTH DAILY 90 tablet 3    calcitonin (MIACALCIN) 200 UNIT/ACT nasal spray USE 1 SPRAY NASALLY DAILY 11.1 mL 2    nicotine (NICODERM CQ) 21 MG/24HR Place 1 patch onto the skin every 24 hours for 21 days 21 patch 0    gabapentin (NEURONTIN) 100 MG capsule Take 1 capsule by mouth 2 times daily for 30 days.  60 capsule 0    buPROPion (WELLBUTRIN XL) 300 MG extended release tablet TAKE 1 TABLET BY MOUTH EVERY MORNING 90 tablet 1    busPIRone (BUSPAR) 10 MG tablet Take 1 tablet by mouth 3 times daily 90 tablet 3    Handicap Placard MISC by Does not apply route Cannot walk 200 feet due to orthopedic/back pain  Expires in 5 years 4/22/26 1 each 0    citalopram (CELEXA) 20 MG tablet Take 1 tablet by mouth daily 90 tablet 0    aspirin (ASPIRIN CHILDRENS) 81 MG chewable tablet Take 1 tablet by mouth daily 30 tablet 3    Acetaminophen (TYLENOL 8 HOUR ARTHRITIS PAIN PO) Take by mouth      Multiple Vitamins-Minerals (MULTIVITAMIN ADULT PO) Take by mouth      Calcium Carb-Cholecalciferol (CALCIUM + D3) 600-200 MG-UNIT TABS tablet Take 1 tablet by mouth Daily with lunch       No current facility-administered medications for this encounter. Review of Systems   Constitutional: Negative. Negative for fever. HENT: Negative. Eyes: Negative. Respiratory: Negative. Cardiovascular: Negative. Musculoskeletal: Positive for back pain. Objective:  General Appearance:  Uncomfortable, in pain, well-appearing and in no acute distress. Vital signs: (most recent): Pulse 78, temperature 97.3 °F (36.3 °C), resp. rate 16, height 5' (1.524 m), weight 118 lb (53.5 kg), SpO2 99 %, not currently breastfeeding. Vital signs are normal.  No fever. Output: Producing urine and producing stool. HEENT: Normal HEENT exam.    Lungs:  Normal effort and normal respiratory rate. She is not in respiratory distress. Heart: Normal rate. Extremities: Normal range of motion. There is no deformity. Neurological: Patient is alert and oriented to person, place and time. Normal strength. Patient has normal coordination. Pupils:  Pupils are equal, round, and reactive to light. Pupils are equal.   Skin:  Warm and dry. No rash or cyanosis.    Lumbar spine examination  Scoliosis on inspection  Range of motion limited  Positive tenderness to palpation over lumbar paraspinal muscle  Facet loading positive  Straight leg raise positive on left side  Gait is stable    Assessment & Plan   -71-year-old woman with history of chronic low back pain  Back pain is onset many years ago located in the lower lumbar area is a constant aching throbbing pain that is present at rest aggravated with excessive activity  No previous lumbar spine surgical history  Have tried conservative measures with therapy and chiropractic treatments  On chronic opioid therapy with Norco 5 mg twice a day find it helpful but pain is inadequately controlled  Reports no side effect  Denies any illicit substance use  Had recent flareup of back pain with left-sided sciatica located over the left buttock and radiating down all the way to the foot associated with intermittent left leg numbness  No changes in bladder or bowel control  Had recent MRI lumbar spine here for review of the imaging and discuss treatment option  Have the epidural injection in past, last injection was 2 years ago  Medication Refill: Norco    EXAMINATION: MRI OF THE LUMBAR SPINE WITHOUT CONTRAST, 5/13/2022 10:58 am    Impression 1. S-shaped scoliosis of the thoracolumbar spine. 2. No significant spinal canal stenosis. 3. Multilevel neural foraminal narrowing as above. 4. No evidence of spondylolisthesis. 5. Mixed Modic type 1 and type 2 degenerative endplate changes at N8-G6. 1. Lumbosacral spondylosis without myelopathy    2. Vertebrogenic low back pain    3. Left lumbar radiculitis    4. Other idiopathic scoliosis, lumbosacral region    5. Degeneration of lumbar or lumbosacral intervertebral disc    6. Spinal stenosis, lumbar region, without neurogenic claudication    7. Lumbar radiculopathy, chronic    8. Medication monitoring encounter    9.  Medication management        -MRI lumbar spine  Report is reviewed  Images reviewed independently  Finding discussed with patient  Pertinent finding include  Severe scoliosis  Multilevel lumbar degenerative disc disease  L4-L5 level nerve root impingement with foraminal stenosis  Multilevel lumbar facet arthropathy    I think her new left-sided sciatica is relatable to left L4 nerve root impingement  I will recommend for an epidural steroid injection as she has failed conservative measures and pain is affecting quality of life and activity level    Her chronic axial back pain consider lumbar diagnostic medial branch nerve block at lower lumbar facets to rule out facet agenic pain    Automated prescription report reviewed  Safe use of medication discussed  Refill ordered    Orders Placed This Encounter   Procedures    RI NJX DX/THER SBST INTRLMNR LMBR/SAC W/IMG GDN      Orders Placed This Encounter   Medications    HYDROcodone-acetaminophen (NORCO) 5-325 MG per tablet     Sig: Take 1 tablet by mouth 2 times daily as needed for Pain for up to 30 days. Dispense:  60 tablet     Refill:  0     Reduce doses taken as pain becomes manageable      Controlled Substance Monitoring:    Acute and Chronic Pain Monitoring:   RX Monitoring 5/6/2022   Attestation -   Acute Pain Prescriptions -   Periodic Controlled Substance Monitoring No signs of potential drug abuse or diversion identified. ;Possible medication side effects, risk of tolerance/dependence & alternative treatments discussed. ;Assessed functional status. ;Obtaining appropriate analgesic effect of treatment.    Chronic Pain > 50 MEDD -                 Electronically signed by Shawnie Canavan, MD on 6/2/2022 at 11:46 AM

## 2022-06-02 NOTE — PROGRESS NOTES
The patient is a 69 y. o. Non- / non  female. Chief Complaint   Patient presents with    Back Pain    Medication Refill        HPI    Medication Refill: Norco    Pain score Today:  8  Adverse effects (Constipation / Nausea / Sedation / sexual Dysfunction / others) :  none  Mood: fair  Sleep pattern and quality: fair  Activity level: good    Pill count Today: 10  Last dose taken 06/02/2022 morning  OARRS report reviewed today: yes  ER/Hospitalizations/PCP visit related to pain since last visit:no   Any legal problems e.g. DUI etc.:No  Satisfied with current management: Yes    Opioid Contract: 09/17/2020  Last Urine Dug screen dated: 09/28/2021    No results found for: LABA1C  No results found for: EAG    Past Medical History, Past Surgical History, Social History, Allergies and Medications reviewed and updated in EPIC as indicated    Family History reviewed and is noncontributory.         Past Medical History:   Diagnosis Date    Anxiety     Breast cancer ([facility] [address] 1996    left    Cancer ([facility] [address]     breast cancer, left     CKD (chronic kidney disease) stage 3, GFR 30-59 ml/min ([facility])         Past Surgical History:   Procedure Laterality Date    APPENDECTOMY      BREAST LUMPECTOMY      right fatty tissue removed    BREAST REDUCTION SURGERY Right     BREAST SURGERY Left     left mastectomy w/reconstruction, tram    CATARACT REMOVAL Bilateral 03/03/2016    CHOLECYSTECTOMY      COLONOSCOPY      COLONOSCOPY  10/13/2016    Dr Shawna Bearden History     Socioeconomic History    Marital status:      Spouse name: None    Number of children: 2    Years of education: 15    Highest education level: None   Occupational History    Occupation: retired   Tobacco Use    Smoking status: Current Every Day Smoker     Packs/day: 1.00     Years: 32.00     Pack years: 32.00    Smokeless tobacco: Never Used    Tobacco comment: 1 pack a day   Vaping Use    Vaping Use: Former    Substances: Occasionally   Substance and Sexual Activity    Alcohol use: Yes     Comment: occ. wine or rum.  Drug use: No    Sexual activity: None   Other Topics Concern    None   Social History Narrative    None     Social Determinants of Health     Financial Resource Strain: Low Risk     Difficulty of Paying Living Expenses: Not hard at all   Food Insecurity: No Food Insecurity    Worried About Running Out of Food in the Last Year: Never true    Alix of Food in the Last Year: Never true   Transportation Needs:     Lack of Transportation (Medical): Not on file    Lack of Transportation (Non-Medical): Not on file   Physical Activity:     Days of Exercise per Week: Not on file    Minutes of Exercise per Session: Not on file   Stress:     Feeling of Stress : Not on file   Social Connections:     Frequency of Communication with Friends and Family: Not on file    Frequency of Social Gatherings with Friends and Family: Not on file    Attends [employer] Services: Not on file    Active Member of [address] [place] [address] or Organizations: Not on file    Attends Club or Organization Meetings: Not on file    Marital Status: Not on file   Intimate Partner Violence:     Fear of Current or Ex-Partner: Not on file    Emotionally Abused: Not on file    Physically Abused: Not on file    Sexually Abused: Not on file   Housing Stability:     Unable to Pay for Housing in the Last Year: Not on file    Number of [place] in the Last Year: Not on file    Unstable Housing in the Last Year: Not on file       Family History   Problem Relation Age of Onset    Heart Disease Father        No Known Allergies    There were no vitals filed for this visit.     Current Outpatient Medications   Medication Sig Dispense Refill    atorvastatin (LIPITOR) 40 MG tablet TAKE 1 TABLET BY MOUTH DAILY 90 tablet 3    calcitonin (MIACALCIN) 200 UNIT/ACT nasal spray USE 1 SPRAY NASALLY DAILY 11.1 mL 2    nicotine (NICODERM CQ) 21 MG/24HR Place 1 patch onto the skin every 24 hours for 21 days 21 patch 0    gabapentin (NEURONTIN) 100 MG capsule Take 1 capsule by mouth 2 times daily for 30 days. 60 capsule 0    HYDROcodone-acetaminophen (NORCO) 5-325 MG per tablet Take 1 tablet by mouth 2 times daily as needed for Pain for up to 30 days. 60 tablet 0    buPROPion (WELLBUTRIN XL) 300 MG extended release tablet TAKE 1 TABLET BY MOUTH EVERY MORNING 90 tablet 1    busPIRone (BUSPAR) 10 MG tablet Take 1 tablet by mouth 3 times daily 90 tablet 3    Handicap Placard MISC by Does not apply route Cannot walk 200 feet due to orthopedic/back pain  Expires in 5 years 4/22/26 1 each 0    citalopram (CELEXA) 20 MG tablet Take 1 tablet by mouth daily 90 tablet 0    aspirin (ASPIRIN CHILDRENS) 81 MG chewable tablet Take 1 tablet by mouth daily 30 tablet 3    Acetaminophen (TYLENOL 8 HOUR ARTHRITIS PAIN PO) Take by mouth      Multiple Vitamins-Minerals (MULTIVITAMIN ADULT PO) Take by mouth      Calcium Carb-Cholecalciferol (CALCIUM + D3) 600-200 MG-UNIT TABS tablet Take 1 tablet by mouth Daily with lunch       No current facility-administered medications for this encounter. Review of Systems   Constitutional: Negative. HENT: Negative. Eyes: Negative. Respiratory: Negative. Cardiovascular: Negative. Musculoskeletal: Positive for back pain. Objective  Assessment & Plan  No diagnosis found. No orders of the defined types were placed in this encounter. No orders of the defined types were placed in this encounter.            Electronically signed by Amrit Li MA on 6/2/2022 at 10:48 AM

## 2022-06-07 DIAGNOSIS — M48.061 SPINAL STENOSIS, LUMBAR REGION, WITHOUT NEUROGENIC CLAUDICATION: Chronic | ICD-10-CM

## 2022-06-07 DIAGNOSIS — M47.817 LUMBOSACRAL SPONDYLOSIS WITHOUT MYELOPATHY: Chronic | ICD-10-CM

## 2022-06-07 DIAGNOSIS — M51.37 DEGENERATION OF LUMBAR OR LUMBOSACRAL INTERVERTEBRAL DISC: Chronic | ICD-10-CM

## 2022-06-07 DIAGNOSIS — M54.16 LUMBAR RADICULOPATHY, CHRONIC: ICD-10-CM

## 2022-06-07 RX ORDER — GABAPENTIN 100 MG/1
CAPSULE ORAL
Qty: 60 CAPSULE | Refills: 0 | Status: SHIPPED | OUTPATIENT
Start: 2022-06-07 | End: 2022-07-14 | Stop reason: SDUPTHER

## 2022-07-14 ENCOUNTER — HOSPITAL ENCOUNTER (OUTPATIENT)
Dept: PAIN MANAGEMENT | Age: 77
Discharge: HOME | End: 2022-07-14
Payer: MEDICARE

## 2022-07-14 VITALS
SYSTOLIC BLOOD PRESSURE: 152 MMHG | HEIGHT: 59 IN | OXYGEN SATURATION: 99 % | BODY MASS INDEX: 22.78 KG/M2 | HEART RATE: 84 BPM | WEIGHT: 113 LBS | DIASTOLIC BLOOD PRESSURE: 64 MMHG

## 2022-07-14 DIAGNOSIS — M41.27 OTHER IDIOPATHIC SCOLIOSIS, LUMBOSACRAL REGION: ICD-10-CM

## 2022-07-14 DIAGNOSIS — M54.51 VERTEBROGENIC LOW BACK PAIN: Chronic | ICD-10-CM

## 2022-07-14 DIAGNOSIS — M47.817 LUMBOSACRAL SPONDYLOSIS WITHOUT MYELOPATHY: Chronic | ICD-10-CM

## 2022-07-14 DIAGNOSIS — Z79.899 MEDICATION MANAGEMENT: ICD-10-CM

## 2022-07-14 DIAGNOSIS — M54.16 LUMBAR RADICULOPATHY, CHRONIC: ICD-10-CM

## 2022-07-14 DIAGNOSIS — M48.061 SPINAL STENOSIS, LUMBAR REGION, WITHOUT NEUROGENIC CLAUDICATION: Chronic | ICD-10-CM

## 2022-07-14 DIAGNOSIS — M51.37 DEGENERATION OF LUMBAR OR LUMBOSACRAL INTERVERTEBRAL DISC: Primary | Chronic | ICD-10-CM

## 2022-07-14 DIAGNOSIS — Z79.891 CHRONIC USE OF OPIATE DRUG FOR THERAPEUTIC PURPOSE: ICD-10-CM

## 2022-07-14 DIAGNOSIS — Z51.81 MEDICATION MONITORING ENCOUNTER: ICD-10-CM

## 2022-07-14 PROCEDURE — 99213 OFFICE O/P EST LOW 20 MIN: CPT

## 2022-07-14 PROCEDURE — 99213 OFFICE O/P EST LOW 20 MIN: CPT | Performed by: NURSE PRACTITIONER

## 2022-07-14 RX ORDER — GABAPENTIN 100 MG/1
CAPSULE ORAL
Qty: 60 CAPSULE | Refills: 0 | Status: SHIPPED | OUTPATIENT
Start: 2022-07-14 | End: 2022-08-15 | Stop reason: SDUPTHER

## 2022-07-14 RX ORDER — HYDROCODONE BITARTRATE AND ACETAMINOPHEN 5; 325 MG/1; MG/1
1 TABLET ORAL 2 TIMES DAILY PRN
Qty: 60 TABLET | Refills: 0 | Status: SHIPPED | OUTPATIENT
Start: 2022-07-14 | End: 2022-08-15 | Stop reason: SDUPTHER

## 2022-07-14 ASSESSMENT — ENCOUNTER SYMPTOMS
BACK PAIN: 1
CONSTIPATION: 0
COUGH: 0
SHORTNESS OF BREATH: 0
BOWEL INCONTINENCE: 0

## 2022-07-14 ASSESSMENT — PAIN DESCRIPTION - ORIENTATION: ORIENTATION: LOWER

## 2022-07-14 ASSESSMENT — PAIN DESCRIPTION - DESCRIPTORS: DESCRIPTORS: SHOOTING;STABBING

## 2022-07-14 ASSESSMENT — PAIN DESCRIPTION - PAIN TYPE: TYPE: CHRONIC PAIN

## 2022-07-14 ASSESSMENT — PAIN DESCRIPTION - LOCATION: LOCATION: BACK

## 2022-07-14 ASSESSMENT — PAIN SCALES - GENERAL: PAINLEVEL_OUTOF10: 2

## 2022-07-14 ASSESSMENT — PAIN DESCRIPTION - FREQUENCY: FREQUENCY: INTERMITTENT

## 2022-07-14 ASSESSMENT — PAIN DESCRIPTION - DIRECTION: RADIATING_TOWARDS: LEFT LEG TO FOOT

## 2022-07-14 NOTE — PROGRESS NOTES
Chief Complaint   Patient presents with    Back Pain    Medication Refill         [facility]     Patient complains of back pain that radiates down her  legs at times with prolonged activity and standing. Lumbar XR shows Degenerative-type scoliosis of the lumbar spine with multilevel degenerative change. She has never had back surgery.  She has had PT and chiropractic care with mild benefit. . She has had LESI in the past with mild relief lasting one month.  has not been seen in our office since jan due to being in [place]. Did have 2 months of PM&R  3 months of PT while there and found it helpful. Continues with HEP. States she had \"plasma injection\" in her back and found it helpful somewhat. Mri completed 5/2022 showed S-shaped scoliosis of the thoracolumbar spine Multilevel lumbar degenerative disc disease  L4-L5 level nerve root impingement with foraminal stenosis  Multilevel lumbar facet arthropathy  Dr Nia Robles recommended LESI  L4/5 waiting for clearance to hold ASA. May also benefit from MBB    Back Pain  This is a chronic problem. The current episode started more than 1 year ago. The problem occurs intermittently. The problem is unchanged. The pain is present in the lumbar spine and gluteal. The quality of the pain is described as shooting and stabbing. The pain radiates to the left foot. The pain is at a severity of 2/10. The pain is mild. The pain is the same all the time. The symptoms are aggravated by bending, twisting, standing and position. Pertinent negatives include no bladder incontinence, bowel incontinence, chest pain or fever. Risk factors include history of cancer. She has tried chiropractic manipulation, heat, ice, walking, muscle relaxant, NSAIDs, bed rest and home exercises for the symptoms. The treatment provided mild relief. Patient denies any new neurological symptoms. No bowel or bladder incontinence, no weakness, and no falling.     Pill count: due 7/10    Morphine equivalent: 10    Controlled Substance Monitoring:    Acute and Chronic Pain Monitoring:   RX Monitoring 7/14/2022   Attestation -   Acute Pain Prescriptions -   Periodic Controlled Substance Monitoring Possible medication side effects, risk of tolerance/dependence & alternative treatments discussed. ;No signs of potential drug abuse or diversion identified. ;Assessed functional status. ;Obtaining appropriate analgesic effect of treatment. Chronic Pain > 50 MEDD -         Periodic Controlled Substance Monitoring: Possible medication side effects, risk of tolerance/dependence & alternative treatments discussed. ,No signs of potential drug abuse or diversion identified. ,Assessed functional status. ,Obtaining appropriate analgesic effect of treatment.  Elvira Varela, APRN - CNP)      Past Medical History:   Diagnosis Date    Anxiety     Breast cancer [facility]) 1996    left    Cancer ([facility] [address]     breast cancer, left     CKD (chronic kidney disease) stage 3, GFR 30-59 ml/min ([facility])        Past Surgical History:   Procedure Laterality Date    APPENDECTOMY      BREAST LUMPECTOMY      right fatty tissue removed    BREAST REDUCTION SURGERY Right     BREAST SURGERY Left     left mastectomy w/reconstruction, tram    CATARACT REMOVAL Bilateral 03/03/2016    CHOLECYSTECTOMY      COLONOSCOPY      COLONOSCOPY  10/13/2016    Dr Joe Parker         No Known Allergies      Current Outpatient Medications:     gabapentin (NEURONTIN) 100 MG capsule, TAKE 1 CAPSULE BY MOUTH TWICE DAILY, Disp: 60 capsule, Rfl: 0    atorvastatin (LIPITOR) 40 MG tablet, TAKE 1 TABLET BY MOUTH DAILY, Disp: 90 tablet, Rfl: 3    calcitonin (MIACALCIN) 200 UNIT/ACT nasal spray, USE 1 SPRAY NASALLY DAILY, Disp: 11.1 mL, Rfl: 2    nicotine (NICODERM CQ) 21 MG/24HR, Place 1 patch onto the skin every 24 hours for 21 days, Disp: 21 patch, Rfl: 0    buPROPion (WELLBUTRIN XL) 300 MG extended release tablet, TAKE 1 TABLET BY MOUTH EVERY MORNING, Disp: 90 tablet, Rfl: 1    busPIRone (BUSPAR) 10 MG tablet, Take 1 tablet by mouth 3 times daily, Disp: 90 tablet, Rfl: 3    Handicap Placard MISC, by Does not apply route Cannot walk 200 feet due to orthopedic/back pain Expires in 5 years 4/22/26, Disp: 1 each, Rfl: 0    citalopram (CELEXA) 20 MG tablet, Take 1 tablet by mouth daily, Disp: 90 tablet, Rfl: 0    aspirin (ASPIRIN CHILDRENS) 81 MG chewable tablet, Take 1 tablet by mouth daily, Disp: 30 tablet, Rfl: 3    Acetaminophen (TYLENOL 8 HOUR ARTHRITIS PAIN PO), Take by mouth, Disp: , Rfl:     Multiple Vitamins-Minerals (MULTIVITAMIN ADULT PO), Take by mouth, Disp: , Rfl:     Calcium Carb-Cholecalciferol (CALCIUM + D3) 600-200 MG-UNIT TABS tablet, Take 1 tablet by mouth Daily with lunch, Disp: , Rfl:     Family History   Problem Relation Age of Onset    Heart Disease Father        Social History     Socioeconomic History    Marital status:      Spouse name: Not on file    Number of children: 2    Years of education: 15    Highest education level: Not on file   Occupational History    Occupation: retired   Tobacco Use    Smoking status: Current Every Day Smoker     Packs/day: 1.00     Years: 32.00     Pack years: 32.00    Smokeless tobacco: Never Used    Tobacco comment: 1 pack a day   Vaping Use    Vaping Use: Former    Substances: Occasionally   Substance and Sexual Activity    Alcohol use: Yes     Comment: occ. wine or rum.  Drug use: No    Sexual activity: Not on file   Other Topics Concern    Not on file   Social History Narrative    Not on file     Social Determinants of Health     Financial Resource Strain: Low Risk     Difficulty of Paying Living Expenses: Not hard at all   Food Insecurity: No Food Insecurity    Worried About Running Out of Food in the Last Year: Never true    [address] in the Last Year: Never true   Transportation Needs:     Lack of Transportation (Medical):  Not on file    Lack of Transportation (Non-Medical): Not on file   Physical Activity:     Days of Exercise per Week: Not on file    Minutes of Exercise per Session: Not on file   Stress:     Feeling of Stress : Not on file   Social Connections:     Frequency of Communication with Friends and Family: Not on file    Frequency of Social Gatherings with Friends and Family: Not on file    Attends Scient[employer] Services: Not on file    Active Member of [address] [place] [address] or Organizations: Not on file    Attends Club or Organization Meetings: Not on file    Marital Status: Not on file   Intimate Partner Violence:     Fear of Current or Ex-Partner: Not on file    Emotionally Abused: Not on file    Physically Abused: Not on file    Sexually Abused: Not on file   Housing Stability:     Unable to Pay for Housing in the Last Year: Not on file    Number of [place] in the Last Year: Not on file    Unstable Housing in the Last Year: Not on file       Review of Systems:  Review of Systems   Constitutional: Negative for chills and fever. Cardiovascular: Negative for chest pain. Respiratory: Negative for cough and shortness of breath. Musculoskeletal: Positive for back pain. Gastrointestinal: Negative for bowel incontinence and constipation. Genitourinary: Negative for bladder incontinence. Physical Exam:  BP (!) 152/64   Pulse 84   Ht 4' 11\" (1.499 m)   Wt 113 lb (51.3 kg)   SpO2 99%   BMI 22.82 kg/m²     Physical Exam  Cardiovascular:      Rate and Rhythm: Normal rate. Pulmonary:      Effort: Pulmonary effort is normal.   Musculoskeletal:         General: Normal range of motion. Skin:     General: Skin is warm and dry. Neurological:      Mental Status: She is alert and oriented to person, place, and time.          Record/Diagnostics Review:    Last sharee 9/2021  and was appropriate     Assessment:  Problem List Items Addressed This Visit     Degeneration of lumbar or lumbosacral intervertebral disc - Primary (Chronic)    Lumbosacral spondylosis without myelopathy (Chronic)    Spinal stenosis, lumbar region, without neurogenic claudication (Chronic)    Vertebrogenic low back pain (Chronic)    Other idiopathic scoliosis, lumbosacral region (Chronic)    Chronic use of opiate drug for therapeutic purpose             Treatment Plan:  Patient relates current medications are helping the pain. Patient reports taking pain medications as prescribed, denies obtaining medications from different sources and denies use of illegal drugs. Patient denies side effects from medications like nausea, vomiting, constipation or drowsiness. Patient reports current activities of daily living are possible due to medications and would like to continue them. As always, we encourage daily stretching and strengthening exercises, and recommend minimizing use of pain medications unless patient cannot get through daily activities due to pain. Contract requirements met. Continue opioid therapy. Script written for norco  Awaiting clearance to hold ASA for LESI - sent 6/2/22  Follow up appointment made for 4 weeks    I have reviewed the chief complaint and history of present illness (including ROS and [facility]) and vital documentation by my staff and I agree with their documentation and have added where applicable.

## 2022-08-09 ENCOUNTER — TELEPHONE (OUTPATIENT)
Dept: PAIN MANAGEMENT | Age: 77
End: 2022-08-09

## 2022-08-09 NOTE — TELEPHONE ENCOUNTER
I called patient to schedule LESI; patient states she would rather have MBB as epidural doesn't work for her. Patient will discuss with NP at her August 15th appt.

## 2022-08-15 ENCOUNTER — HOSPITAL ENCOUNTER (OUTPATIENT)
Dept: PAIN MANAGEMENT | Age: 77
Discharge: HOME | End: 2022-08-15
Payer: MEDICARE

## 2022-08-15 VITALS
HEART RATE: 98 BPM | BODY MASS INDEX: 22.78 KG/M2 | SYSTOLIC BLOOD PRESSURE: 125 MMHG | WEIGHT: 113 LBS | OXYGEN SATURATION: 98 % | RESPIRATION RATE: 16 BRPM | DIASTOLIC BLOOD PRESSURE: 62 MMHG | TEMPERATURE: 97.3 F | HEIGHT: 59 IN

## 2022-08-15 DIAGNOSIS — M51.37 DEGENERATION OF LUMBAR OR LUMBOSACRAL INTERVERTEBRAL DISC: Chronic | ICD-10-CM

## 2022-08-15 DIAGNOSIS — M54.16 LUMBAR RADICULOPATHY, CHRONIC: ICD-10-CM

## 2022-08-15 DIAGNOSIS — Z79.891 CHRONIC USE OF OPIATE DRUG FOR THERAPEUTIC PURPOSE: ICD-10-CM

## 2022-08-15 DIAGNOSIS — M47.817 LUMBOSACRAL SPONDYLOSIS WITHOUT MYELOPATHY: Chronic | ICD-10-CM

## 2022-08-15 DIAGNOSIS — Z79.899 MEDICATION MANAGEMENT: ICD-10-CM

## 2022-08-15 DIAGNOSIS — M48.061 SPINAL STENOSIS, LUMBAR REGION, WITHOUT NEUROGENIC CLAUDICATION: Chronic | ICD-10-CM

## 2022-08-15 DIAGNOSIS — Z51.81 MEDICATION MONITORING ENCOUNTER: ICD-10-CM

## 2022-08-15 DIAGNOSIS — M41.27 OTHER IDIOPATHIC SCOLIOSIS, LUMBOSACRAL REGION: Primary | ICD-10-CM

## 2022-08-15 PROCEDURE — 99213 OFFICE O/P EST LOW 20 MIN: CPT

## 2022-08-15 PROCEDURE — 99213 OFFICE O/P EST LOW 20 MIN: CPT | Performed by: NURSE PRACTITIONER

## 2022-08-15 RX ORDER — HYDROCODONE BITARTRATE AND ACETAMINOPHEN 5; 325 MG/1; MG/1
1 TABLET ORAL 2 TIMES DAILY PRN
Qty: 60 TABLET | Refills: 0 | Status: SHIPPED | OUTPATIENT
Start: 2022-08-15 | End: 2022-09-15 | Stop reason: SDUPTHER

## 2022-08-15 RX ORDER — GABAPENTIN 100 MG/1
100 CAPSULE ORAL 3 TIMES DAILY
Qty: 90 CAPSULE | Refills: 0 | Status: SHIPPED | OUTPATIENT
Start: 2022-08-15 | End: 2022-09-15

## 2022-08-15 ASSESSMENT — ENCOUNTER SYMPTOMS
BOWEL INCONTINENCE: 0
COUGH: 0
SHORTNESS OF BREATH: 0
CONSTIPATION: 0
BACK PAIN: 1

## 2022-08-15 ASSESSMENT — PAIN SCALES - GENERAL: PAINLEVEL_OUTOF10: 5

## 2022-08-15 NOTE — PROGRESS NOTES
Chief Complaint   Patient presents with    Back Pain    Medication Refill         [facility]  Patient complains of back pain that radiates down her  legs at times with prolonged activity and standing. Lumbar XR shows Degenerative-type scoliosis of the lumbar spine with multilevel degenerative change. She has never had back surgery. She has had PT and chiropractic care with mild benefit. . She has had LESI in the past with mild relief lasting one month. has not been seen in our office since [address] due to being in [place]. Did have 2 months of PM&R  3 months of PT while there and found it helpful. Continues with HEP. States she had \"plasma injection\" in her back and found it helpful somewhat. Mri completed 5/2022 showed S-shaped scoliosis of the thoracolumbar spine Multilevel lumbar degenerative disc disease  L4-L5 level nerve root impingement with foraminal stenosis  Multilevel lumbar facet arthropathy  Dr Enriqueta Patterson recommended LESI  L4/5. May also benefit from MBB     Back Pain  This is a chronic problem. The current episode started more than 1 year ago. The problem occurs constantly. The problem is unchanged. The pain is present in the lumbar spine. Radiates to: down left leg to the ankle. The pain is at a severity of 5/10. The pain is The same all the time. The symptoms are aggravated by bending, sitting and standing. Pertinent negatives include no bladder incontinence, bowel incontinence, chest pain, fever, headaches, numbness, tingling or weakness. She has tried ice, heat and analgesics (injections, PT) for the symptoms. The treatment provided mild relief. Patient denies any new neurological symptoms. No bowel or bladder incontinence, no weakness, and no falling.     Pill count: appropriate / Hydrocodone - 2 was due 8/13    Morphine equivalent: 10    Controlled Substance Monitoring:    Acute and Chronic Pain Monitoring:   RX Monitoring 8/15/2022   Attestation -   Acute Pain Prescriptions -   Periodic Controlled Substance Monitoring Possible medication side effects, risk of tolerance/dependence & alternative treatments discussed. ;No signs of potential drug abuse or diversion identified.;Obtaining appropriate analgesic effect of treatment.    Chronic Pain > 50 MEDD -            Past Medical History:   Diagnosis Date    Anxiety     Breast cancer ([facility] [address] 1996    left    Cancer ([facility] [address]     breast cancer, left     CKD (chronic kidney disease) stage 3, GFR 30-59 ml/min ([facility])        Past Surgical History:   Procedure Laterality Date    APPENDECTOMY      BREAST LUMPECTOMY      right fatty tissue removed    BREAST REDUCTION SURGERY Right     BREAST SURGERY Left     left mastectomy w/reconstruction, tram    CATARACT REMOVAL Bilateral 03/03/2016    CHOLECYSTECTOMY      COLONOSCOPY      COLONOSCOPY  10/13/2016    Dr Childress Shall         No Known Allergies      Current Outpatient Medications:     gabapentin (NEURONTIN) 100 MG capsule, TAKE 1 CAPSULE BY MOUTH TWICE DAILY, Disp: 60 capsule, Rfl: 0    atorvastatin (LIPITOR) 40 MG tablet, TAKE 1 TABLET BY MOUTH DAILY, Disp: 90 tablet, Rfl: 3    calcitonin (MIACALCIN) 200 UNIT/ACT nasal spray, USE 1 SPRAY NASALLY DAILY, Disp: 11.1 mL, Rfl: 2    nicotine (NICODERM CQ) 21 MG/24HR, Place 1 patch onto the skin every 24 hours for 21 days, Disp: 21 patch, Rfl: 0    buPROPion (WELLBUTRIN XL) 300 MG extended release tablet, TAKE 1 TABLET BY MOUTH EVERY MORNING, Disp: 90 tablet, Rfl: 1    busPIRone (BUSPAR) 10 MG tablet, Take 1 tablet by mouth 3 times daily, Disp: 90 tablet, Rfl: 3    Handicap Placard MISC, by Does not apply route Cannot walk 200 feet due to orthopedic/back pain Expires in 5 years 4/22/26, Disp: 1 each, Rfl: 0    citalopram (CELEXA) 20 MG tablet, Take 1 tablet by mouth daily, Disp: 90 tablet, Rfl: 0    aspirin (ASPIRIN CHILDRENS) 81 MG chewable tablet, Take 1 tablet by mouth daily, Disp: 30 tablet, Rfl: 3    Acetaminophen (TYLENOL 8 HOUR ARTHRITIS PAIN PO), Take by mouth, Disp: , Rfl:     Multiple Vitamins-Minerals (MULTIVITAMIN ADULT PO), Take by mouth, Disp: , Rfl:     Calcium Carb-Cholecalciferol (CALCIUM + D3) 600-200 MG-UNIT TABS tablet, Take 1 tablet by mouth Daily with lunch, Disp: , Rfl:     Family History   Problem Relation Age of Onset    Heart Disease Father        Social History     Socioeconomic History    Marital status:      Spouse name: Not on file    Number of children: 2    Years of education: 12    Highest education level: Not on file   Occupational History    Occupation: retired   Tobacco Use    Smoking status: Every Day     Packs/day: 1.00     Years: 32.00     Pack years: 32.00     Types: Cigarettes    Smokeless tobacco: Never    Tobacco comments:     1 pack a day   Vaping Use    Vaping Use: Former    Substances: Occasionally   Substance and Sexual Activity    Alcohol use: Yes     Comment: occ. wine or rum. Drug use: No    Sexual activity: Not on file   Other Topics Concern    Not on file   Social History Narrative    Not on file     Social Determinants of Health     Financial Resource Strain: Not on file   Food Insecurity: Not on file   Transportation Needs: Not on file   Physical Activity: Not on file   Stress: Not on file   Social Connections: Not on file   Intimate Partner Violence: Not on file   Housing Stability: Not on file       Review of Systems:  Review of Systems   Constitutional: Negative for chills and fever. Cardiovascular:  Negative for chest pain and palpitations. Respiratory:  Negative for cough and shortness of breath. Musculoskeletal:  Positive for back pain. Gastrointestinal:  Negative for bowel incontinence and constipation. Genitourinary:  Negative for bladder incontinence. Neurological:  Negative for disturbances in coordination, headaches, loss of balance, numbness, tingling and weakness.      Physical Exam:  /62   Pulse 98   Temp 97.3 °F (36.3 °C)   Resp 16   Ht 4' 11\" (1.499 m)   Wt 113 lb (51.3 kg)   SpO2 98%   BMI 22.82 kg/m²     Physical Exam  Constitutional:        HENT:      Head: Normocephalic. Pulmonary:      Effort: Pulmonary effort is normal.   Musculoskeletal:         General: Normal range of motion. Cervical back: Normal range of motion. Lumbar back: Tenderness present. Skin:     General: Skin is warm and dry. Neurological:      Mental Status: She is alert and oriented to person, place, and time.        Record/Diagnostics Review:    Last sharee 9/2021 and was appropriate     Assessment:  Problem List Items Addressed This Visit       Degeneration of lumbar or lumbosacral intervertebral disc (Chronic)    Relevant Medications    HYDROcodone-acetaminophen (NORCO) 5-325 MG per tablet    gabapentin (NEURONTIN) 100 MG capsule    Other Relevant Orders    Lumbar Epidural Steroid Injection/Caudal    Lumbosacral spondylosis without myelopathy (Chronic)    Relevant Medications    HYDROcodone-acetaminophen (NORCO) 5-325 MG per tablet    gabapentin (NEURONTIN) 100 MG capsule    Other Relevant Orders    Lumbar Epidural Steroid Injection/Caudal    Spinal stenosis, lumbar region, without neurogenic claudication (Chronic)    Relevant Medications    HYDROcodone-acetaminophen (NORCO) 5-325 MG per tablet    gabapentin (NEURONTIN) 100 MG capsule    Other idiopathic scoliosis, lumbosacral region - Primary (Chronic)    Chronic use of opiate drug for therapeutic purpose    Medication management    Relevant Medications    HYDROcodone-acetaminophen (NORCO) 5-325 MG per tablet    Lumbar radiculopathy, chronic    Relevant Medications    HYDROcodone-acetaminophen (NORCO) 5-325 MG per tablet    gabapentin (NEURONTIN) 100 MG capsule    Other Relevant Orders    Lumbar Epidural Steroid Injection/Caudal     Other Visit Diagnoses       Medication monitoring encounter        Relevant Medications    HYDROcodone-acetaminophen (NORCO) 5-325 MG per tablet               Treatment Plan:  Patient relates current medications are helping the pain. Patient reports taking pain medications as prescribed, denies obtaining medications from different sources and denies use of illegal drugs. Patient denies side effects from medications like nausea, vomiting, constipation or drowsiness. Patient reports current activities of daily living are possible due to medications and would like to continue them. As always, we encourage daily stretching and strengthening exercises, and recommend minimizing use of pain medications unless patient cannot get through daily activities due to pain. Contract requirements met. Continue opioid therapy. Script written for norco  Back pain continues and is radiating down the left leg  Clearance to hold ASA received - will schedule for LESI  If she continues to have axial pain, will try MBB/RFA  Follow up appointment made for 4 weeks    I have reviewed the chief complaint and history of present illness (including ROS and PFSH) and vital documentation by my staff and I agree with their documentation and have added where applicable.

## 2022-09-09 ENCOUNTER — TELEPHONE (OUTPATIENT)
Dept: PAIN MANAGEMENT | Age: 77
End: 2022-09-09

## 2022-09-09 NOTE — TELEPHONE ENCOUNTER
Patient left a msg stating that she needs a refill on her medication.  Please advise  Last OV 08/15/2022  Next OV 10/11/2022

## 2022-09-15 DIAGNOSIS — M54.16 LUMBAR RADICULOPATHY, CHRONIC: ICD-10-CM

## 2022-09-15 DIAGNOSIS — M51.37 DEGENERATION OF LUMBAR OR LUMBOSACRAL INTERVERTEBRAL DISC: Chronic | ICD-10-CM

## 2022-09-15 DIAGNOSIS — M47.817 LUMBOSACRAL SPONDYLOSIS WITHOUT MYELOPATHY: Chronic | ICD-10-CM

## 2022-09-15 DIAGNOSIS — M48.061 SPINAL STENOSIS, LUMBAR REGION, WITHOUT NEUROGENIC CLAUDICATION: Chronic | ICD-10-CM

## 2022-09-15 DIAGNOSIS — Z51.81 MEDICATION MONITORING ENCOUNTER: ICD-10-CM

## 2022-09-15 DIAGNOSIS — Z79.899 MEDICATION MANAGEMENT: ICD-10-CM

## 2022-09-15 RX ORDER — HYDROCODONE BITARTRATE AND ACETAMINOPHEN 5; 325 MG/1; MG/1
1 TABLET ORAL 2 TIMES DAILY PRN
Qty: 60 TABLET | Refills: 0 | Status: SHIPPED | OUTPATIENT
Start: 2022-09-15 | End: 2022-10-11 | Stop reason: SDUPTHER

## 2022-09-15 RX ORDER — GABAPENTIN 100 MG/1
CAPSULE ORAL
Qty: 90 CAPSULE | Refills: 0 | Status: SHIPPED | OUTPATIENT
Start: 2022-09-15 | End: 2022-10-11 | Stop reason: SDUPTHER

## 2022-09-25 DIAGNOSIS — Z51.81 MEDICATION MONITORING ENCOUNTER: ICD-10-CM

## 2022-09-26 RX ORDER — BUPROPION HYDROCHLORIDE 300 MG/1
300 TABLET ORAL EVERY MORNING
Qty: 90 TABLET | Refills: 1 | Status: SHIPPED | OUTPATIENT
Start: 2022-09-26

## 2022-09-27 ENCOUNTER — TELEPHONE (OUTPATIENT)
Dept: ONCOLOGY | Age: 77
End: 2022-09-27

## 2022-09-27 DIAGNOSIS — Z87.891 PERSONAL HISTORY OF NICOTINE DEPENDENCE: Primary | ICD-10-CM

## 2022-09-27 NOTE — TELEPHONE ENCOUNTER
Our records indicate that your patient is coming due for their annual lung cancer screening follow up testing. For your convenience, we have pended the order for the scan for you. If you do not agree with the need for the test, please cancel the order and let us know. Sincerely,    [address] [place] [address] Screening Program    Auto printed reminder letter sent to patient.

## 2022-10-11 ENCOUNTER — HOSPITAL ENCOUNTER (OUTPATIENT)
Dept: PAIN MANAGEMENT | Age: 77
Discharge: HOME | End: 2022-10-11
Payer: MEDICARE

## 2022-10-11 VITALS
DIASTOLIC BLOOD PRESSURE: 66 MMHG | RESPIRATION RATE: 18 BRPM | HEART RATE: 89 BPM | HEIGHT: 59 IN | OXYGEN SATURATION: 98 % | BODY MASS INDEX: 22.78 KG/M2 | WEIGHT: 113 LBS | SYSTOLIC BLOOD PRESSURE: 131 MMHG

## 2022-10-11 DIAGNOSIS — M54.51 VERTEBROGENIC LOW BACK PAIN: Chronic | ICD-10-CM

## 2022-10-11 DIAGNOSIS — Z79.899 MEDICATION MANAGEMENT: ICD-10-CM

## 2022-10-11 DIAGNOSIS — M54.16 LUMBAR RADICULOPATHY, CHRONIC: ICD-10-CM

## 2022-10-11 DIAGNOSIS — M47.817 LUMBOSACRAL SPONDYLOSIS WITHOUT MYELOPATHY: Chronic | ICD-10-CM

## 2022-10-11 DIAGNOSIS — Z51.81 MEDICATION MONITORING ENCOUNTER: ICD-10-CM

## 2022-10-11 DIAGNOSIS — M51.37 DEGENERATION OF LUMBAR OR LUMBOSACRAL INTERVERTEBRAL DISC: Chronic | ICD-10-CM

## 2022-10-11 DIAGNOSIS — Z79.891 CHRONIC USE OF OPIATE DRUG FOR THERAPEUTIC PURPOSE: ICD-10-CM

## 2022-10-11 DIAGNOSIS — M41.27 OTHER IDIOPATHIC SCOLIOSIS, LUMBOSACRAL REGION: Primary | ICD-10-CM

## 2022-10-11 DIAGNOSIS — M48.061 SPINAL STENOSIS, LUMBAR REGION, WITHOUT NEUROGENIC CLAUDICATION: Chronic | ICD-10-CM

## 2022-10-11 PROCEDURE — 99213 OFFICE O/P EST LOW 20 MIN: CPT

## 2022-10-11 PROCEDURE — G0481 DRUG TEST DEF 8-14 CLASSES: HCPCS

## 2022-10-11 PROCEDURE — 99213 OFFICE O/P EST LOW 20 MIN: CPT | Performed by: NURSE PRACTITIONER

## 2022-10-11 PROCEDURE — 80307 DRUG TEST PRSMV CHEM ANLYZR: CPT

## 2022-10-11 RX ORDER — HYDROCODONE BITARTRATE AND ACETAMINOPHEN 5; 325 MG/1; MG/1
1 TABLET ORAL 2 TIMES DAILY PRN
Qty: 60 TABLET | Refills: 0 | Status: SHIPPED | OUTPATIENT
Start: 2022-10-15 | End: 2022-11-14

## 2022-10-11 RX ORDER — GABAPENTIN 100 MG/1
CAPSULE ORAL
Qty: 90 CAPSULE | Refills: 2 | Status: SHIPPED | OUTPATIENT
Start: 2022-10-11 | End: 2022-11-10

## 2022-10-11 ASSESSMENT — ENCOUNTER SYMPTOMS
CONSTIPATION: 0
BOWEL INCONTINENCE: 0
BACK PAIN: 1
SHORTNESS OF BREATH: 0
COUGH: 0

## 2022-10-11 NOTE — PROGRESS NOTES
Chief Complaint   Patient presents with    Back Pain     Med Refill       PMH     Patient complains of back pain that radiates down her  legs at times with prolonged activity and standing. Lumbar XR shows Degenerative-type scoliosis of the lumbar spine with multilevel degenerative change. She has never had back surgery. She has had PT and chiropractic care with mild benefit. . She has had LESI in the past with mild relief lasting one month. Continues with HEP. States she had \"plasma injection\" in her back and found it helpful somewhat. MRI completed 5/2022 showed S-shaped scoliosis of the thoracolumbar spine Multilevel lumbar degenerative disc disease. L4-L5 level nerve root impingement with foraminal stenosis Multilevel lumbar facet arthropathy  Dr Bell Duran recommended LESI  L4/5 and may also benefit from MBB  Pt had to cancel LESI d/t lack of transportation. Plans to reschedule      HPI:     Back Pain  This is a chronic problem. The current episode started more than 1 year ago. The problem occurs daily. The problem is unchanged. The pain is present in the lumbar spine. The quality of the pain is described as shooting, stabbing and aching. The pain radiates to the left knee, left thigh and left foot. The pain is at a severity of 3/10. The pain is mild. The pain is Worse during the day. The symptoms are aggravated by bending, position, standing and twisting. Pertinent negatives include no bladder incontinence, bowel incontinence, chest pain, fever, numbness, tingling or weakness. Risk factors include history of cancer and history of osteoporosis. She has tried bed rest, home exercises, heat, ice, walking and chiropractic manipulation for the symptoms. The treatment provided mild relief. Patient denies any new neurological symptoms. No bowel or bladder incontinence, no weakness, and no falling.     Pill count: Hydrocodone 2 short  6 tabs due 10/15 pt warned       Morphine equivalent: 10    Controlled Substance Monitoring:    Acute and Chronic Pain Monitoring:   RX Monitoring 10/11/2022   Attestation -   Acute Pain Prescriptions -   Periodic Controlled Substance Monitoring Possible medication side effects, risk of tolerance/dependence & alternative treatments discussed. ;No signs of potential drug abuse or diversion identified.;Obtaining appropriate analgesic effect of treatment. ;Random urine drug screen sent today. ;Assessed functional status. Chronic Pain > 50 MEDD -          Periodic Controlled Substance Monitoring: Possible medication side effects, risk of tolerance/dependence & alternative treatments discussed., No signs of potential drug abuse or diversion identified., Obtaining appropriate analgesic effect of treatment. , Random urine drug screen sent today., Assessed functional status. Chantel Wright, APRN - CNP)      Past Medical History:   Diagnosis Date    Anxiety     Breast cancer ([facility] [address] 1996    left    Cancer ([facility] [address]     breast cancer, left     CKD (chronic kidney disease) stage 3, GFR 30-59 ml/min ([facility])        Past Surgical History:   Procedure Laterality Date    APPENDECTOMY      BREAST LUMPECTOMY      right fatty tissue removed    BREAST REDUCTION SURGERY Right     BREAST SURGERY Left     left mastectomy w/reconstruction, tram    CATARACT REMOVAL Bilateral 03/03/2016    CHOLECYSTECTOMY      COLONOSCOPY      COLONOSCOPY  10/13/2016    Dr David Cummings         No Known Allergies      Current Outpatient Medications:     [START ON 10/15/2022] HYDROcodone-acetaminophen (NORCO) 5-325 MG per tablet, Take 1 tablet by mouth 2 times daily as needed for Pain for up to 30 days. , Disp: 60 tablet, Rfl: 0    gabapentin (NEURONTIN) 100 MG capsule, TAKE ONE CAPSULE BY MOUTH THREE TIMES DAILY, Disp: 90 capsule, Rfl: 2    buPROPion (WELLBUTRIN XL) 300 MG extended release tablet, TAKE 1 TABLET BY MOUTH EVERY MORNING, Disp: 90 tablet, Rfl: 1    atorvastatin (LIPITOR) 40 MG tablet, TAKE 1 TABLET BY MOUTH DAILY, Disp: 90 tablet, Rfl: 3    calcitonin (MIACALCIN) 200 UNIT/ACT nasal spray, USE 1 SPRAY NASALLY DAILY, Disp: 11.1 mL, Rfl: 2    nicotine (NICODERM CQ) 21 MG/24HR, Place 1 patch onto the skin every 24 hours for 21 days, Disp: 21 patch, Rfl: 0    busPIRone (BUSPAR) 10 MG tablet, Take 1 tablet by mouth 3 times daily, Disp: 90 tablet, Rfl: 3    Handicap Placard MISC, by Does not apply route Cannot walk 200 feet due to orthopedic/back pain Expires in 5 years 4/22/26, Disp: 1 each, Rfl: 0    citalopram (CELEXA) 20 MG tablet, Take 1 tablet by mouth daily, Disp: 90 tablet, Rfl: 0    aspirin (ASPIRIN CHILDRENS) 81 MG chewable tablet, Take 1 tablet by mouth daily, Disp: 30 tablet, Rfl: 3    Acetaminophen (TYLENOL 8 HOUR ARTHRITIS PAIN PO), Take by mouth, Disp: , Rfl:     Multiple Vitamins-Minerals (MULTIVITAMIN ADULT PO), Take by mouth, Disp: , Rfl:     Calcium Carb-Cholecalciferol (CALCIUM + D3) 600-200 MG-UNIT TABS tablet, Take 1 tablet by mouth Daily with lunch, Disp: , Rfl:     Family History   Problem Relation Age of Onset    Heart Disease Father        Social History     Socioeconomic History    Marital status:      Spouse name: Not on file    Number of children: 2    Years of education: 12    Highest education level: Not on file   Occupational History    Occupation: retired   Tobacco Use    Smoking status: Every Day     Packs/day: 1.00     Years: 32.00     Pack years: 32.00     Types: Cigarettes    Smokeless tobacco: Never    Tobacco comments:     1 pack a day   Vaping Use    Vaping Use: Former    Substances: Occasionally   Substance and Sexual Activity    Alcohol use: Yes     Comment: occ. wine or rum.     Drug use: No    Sexual activity: Not on file   Other Topics Concern    Not on file   Social History Narrative    Not on file     Social Determinants of Health     Financial Resource Strain: Not on file   Food Insecurity: Not on file   Transportation Needs: Not on file   Physical Activity: Not on file Stress: Not on file   Social Connections: Not on file   Intimate Partner Violence: Not on file   Housing Stability: Not on file       Review of Systems:  Review of Systems   Constitutional: Negative for chills and fever. Cardiovascular:  Negative for chest pain. Respiratory:  Negative for cough and shortness of breath. Musculoskeletal:  Positive for back pain. Gastrointestinal:  Negative for bowel incontinence and constipation. Genitourinary:  Negative for bladder incontinence. Neurological:  Negative for numbness, tingling and weakness. Physical Exam:  /66   Pulse 89   Resp 18   Ht 4' 11\" (1.499 m)   Wt 113 lb (51.3 kg)   SpO2 98%   BMI 22.82 kg/m²     Physical Exam  Cardiovascular:      Rate and Rhythm: Normal rate. Pulmonary:      Effort: Pulmonary effort is normal.   Musculoskeletal:         General: Normal range of motion. Skin:     General: Skin is warm and dry. Neurological:      Mental Status: She is alert and oriented to person, place, and time.        Record/Diagnostics Review:    Last sharee 9/2021 and was appropriate     Assessment:  Problem List Items Addressed This Visit       Degeneration of lumbar or lumbosacral intervertebral disc (Chronic)    Relevant Medications    HYDROcodone-acetaminophen (NORCO) 5-325 MG per tablet (Start on 10/15/2022)    gabapentin (NEURONTIN) 100 MG capsule    Lumbosacral spondylosis without myelopathy (Chronic)    Relevant Medications    HYDROcodone-acetaminophen (NORCO) 5-325 MG per tablet (Start on 10/15/2022)    gabapentin (NEURONTIN) 100 MG capsule    Spinal stenosis, lumbar region, without neurogenic claudication (Chronic)    Relevant Medications    HYDROcodone-acetaminophen (NORCO) 5-325 MG per tablet (Start on 10/15/2022)    gabapentin (NEURONTIN) 100 MG capsule    Vertebrogenic low back pain (Chronic)    Relevant Medications    HYDROcodone-acetaminophen (NORCO) 5-325 MG per tablet (Start on 10/15/2022)    Other idiopathic scoliosis, lumbosacral region - Primary (Chronic)    Chronic use of opiate drug for therapeutic purpose    Lumbar radiculopathy, chronic    Relevant Medications    HYDROcodone-acetaminophen (NORCO) 5-325 MG per tablet (Start on 10/15/2022)    gabapentin (NEURONTIN) 100 MG capsule     Other Visit Diagnoses       Medication monitoring encounter        Relevant Medications    HYDROcodone-acetaminophen (NORCO) 5-325 MG per tablet (Start on 10/15/2022)    Other Relevant Orders    DRUG SCREEN, PAIN    Medication management        Relevant Medications    HYDROcodone-acetaminophen (NORCO) 5-325 MG per tablet (Start on 10/15/2022)               Treatment Plan:  Patient relates current medications are helping the pain. Patient reports taking pain medications as prescribed, denies obtaining medications from different sources and denies use of illegal drugs. Patient denies side effects from medications like nausea, vomiting, constipation or drowsiness. Patient reports current activities of daily living are possible due to medications and would like to continue them. As always, we encourage daily stretching and strengthening exercises, and recommend minimizing use of pain medications unless patient cannot get through daily activities due to pain. Contract requirements met. Continue opioid therapy. Script written for norco  Pt to reschedule [facility] obtained today for monitoring purposes. Last dose of medication was   Follow up appointment made for 4 weeks    I have reviewed the chief complaint and history of present illness (including ROS and PFSH) and vital documentation by my staff and I agree with their documentation and have added where applicable.

## 2022-10-15 LAB
6-ACETYLMORPHINE, UR: NOT DETECTED
7-AMINOCLONAZEPAM, URINE: NOT DETECTED
ALPHA-OH-ALPRAZ, URINE: NOT DETECTED
ALPHA-OH-MIDAZOLAM, URINE: NOT DETECTED
ALPRAZOLAM, URINE: NOT DETECTED
AMPHETAMINES, URINE: NOT DETECTED
BARBITURATES, URINE: NOT DETECTED
BENZOYLECGONINE, UR: NOT DETECTED
BUPRENORPHINE URINE: NOT DETECTED
CARISOPRODOL, UR: NOT DETECTED
CLONAZEPAM, URINE: NOT DETECTED
CODEINE, URINE: NOT DETECTED
CREATININE URINE: 85.2 MG/DL (ref 20–400)
DIAZEPAM, URINE: NOT DETECTED
DRUGS EXPECTED, UR: NORMAL
EER HI RES INTERP UR: NORMAL
ETHYL GLUCURONIDE UR: NOT DETECTED
FENTANYL URINE: NOT DETECTED
GABAPENTIN: PRESENT
HYDROCODONE, URINE: PRESENT
HYDROMORPHONE, URINE: PRESENT
LORAZEPAM, URINE: NOT DETECTED
MARIJUANA METAB, UR: NOT DETECTED
MDA, UR: NOT DETECTED
MDEA, EVE, UR: NOT DETECTED
MDMA URINE: NOT DETECTED
MEPERIDINE METAB, UR: NOT DETECTED
METHADONE, URINE: NOT DETECTED
METHAMPHETAMINE, URINE: NOT DETECTED
METHYLPHENIDATE: NOT DETECTED
MIDAZOLAM, URINE: NOT DETECTED
MORPHINE URINE: NOT DETECTED
NALOXONE URINE: NOT DETECTED
NORBUPRENORPHINE, URINE: NOT DETECTED
NORDIAZEPAM, URINE: NOT DETECTED
NORFENTANYL, URINE: NOT DETECTED
NORHYDROCODONE, URINE: PRESENT
NOROXYCODONE, URINE: NOT DETECTED
NOROXYMORPHONE, URINE: NOT DETECTED
OXAZEPAM, URINE: NOT DETECTED
OXYCODONE URINE: NOT DETECTED
OXYMORPHONE, URINE: NOT DETECTED
PAIN MANAGEMENT DRUG PANEL INTERP, URINE: NORMAL
PAIN MGT DRUG PANEL, HI RES, UR: NORMAL
PCP,URINE: NOT DETECTED
PHENTERMINE, UR: NOT DETECTED
PREGABALIN: NOT DETECTED
TAPENTADOL, URINE: NOT DETECTED
TAPENTADOL-O-SULFATE, URINE: NOT DETECTED
TEMAZEPAM, URINE: NOT DETECTED
TRAMADOL, URINE: NOT DETECTED
ZOLPIDEM METABOLITE (ZCA), URINE: NOT DETECTED
ZOLPIDEM, URINE: NOT DETECTED

## 2022-10-21 ENCOUNTER — OFFICE VISIT (OUTPATIENT)
Dept: PRIMARY CARE CLINIC | Age: 77
End: 2022-10-21
Payer: MEDICARE

## 2022-10-21 VITALS
BODY MASS INDEX: 23.14 KG/M2 | SYSTOLIC BLOOD PRESSURE: 116 MMHG | HEART RATE: 81 BPM | OXYGEN SATURATION: 99 % | WEIGHT: 114.8 LBS | DIASTOLIC BLOOD PRESSURE: 68 MMHG | HEIGHT: 59 IN | TEMPERATURE: 97.2 F

## 2022-10-21 DIAGNOSIS — Z23 NEED FOR VACCINATION: ICD-10-CM

## 2022-10-21 DIAGNOSIS — Z12.31 ENCOUNTER FOR SCREENING MAMMOGRAM FOR BREAST CANCER: ICD-10-CM

## 2022-10-21 DIAGNOSIS — G44.329 CHRONIC POST-TRAUMATIC HEADACHE, NOT INTRACTABLE: Primary | ICD-10-CM

## 2022-10-21 DIAGNOSIS — M62.838 TRAPEZIUS MUSCLE SPASM: ICD-10-CM

## 2022-10-21 PROCEDURE — 1090F PRES/ABSN URINE INCON ASSESS: CPT | Performed by: FAMILY MEDICINE

## 2022-10-21 PROCEDURE — G8484 FLU IMMUNIZE NO ADMIN: HCPCS | Performed by: FAMILY MEDICINE

## 2022-10-21 PROCEDURE — G8399 PT W/DXA RESULTS DOCUMENT: HCPCS | Performed by: FAMILY MEDICINE

## 2022-10-21 PROCEDURE — 1123F ACP DISCUSS/DSCN MKR DOCD: CPT | Performed by: FAMILY MEDICINE

## 2022-10-21 PROCEDURE — 99214 OFFICE O/P EST MOD 30 MIN: CPT | Performed by: FAMILY MEDICINE

## 2022-10-21 PROCEDURE — 90694 VACC AIIV4 NO PRSRV 0.5ML IM: CPT | Performed by: FAMILY MEDICINE

## 2022-10-21 PROCEDURE — 4004F PT TOBACCO SCREEN RCVD TLK: CPT | Performed by: FAMILY MEDICINE

## 2022-10-21 PROCEDURE — G8427 DOCREV CUR MEDS BY ELIG CLIN: HCPCS | Performed by: FAMILY MEDICINE

## 2022-10-21 PROCEDURE — G0008 ADMIN INFLUENZA VIRUS VAC: HCPCS | Performed by: FAMILY MEDICINE

## 2022-10-21 PROCEDURE — G8420 CALC BMI NORM PARAMETERS: HCPCS | Performed by: FAMILY MEDICINE

## 2022-10-21 RX ORDER — TIZANIDINE 2 MG/1
2 TABLET ORAL 4 TIMES DAILY PRN
Qty: 40 TABLET | Refills: 0 | Status: SHIPPED | OUTPATIENT
Start: 2022-10-21 | End: 2022-10-31

## 2022-10-21 SDOH — ECONOMIC STABILITY: FOOD INSECURITY: WITHIN THE PAST 12 MONTHS, THE FOOD YOU BOUGHT JUST DIDN'T LAST AND YOU DIDN'T HAVE MONEY TO GET MORE.: NEVER TRUE

## 2022-10-21 SDOH — ECONOMIC STABILITY: FOOD INSECURITY: WITHIN THE PAST 12 MONTHS, YOU WORRIED THAT YOUR FOOD WOULD RUN OUT BEFORE YOU GOT MONEY TO BUY MORE.: NEVER TRUE

## 2022-10-21 ASSESSMENT — PATIENT HEALTH QUESTIONNAIRE - PHQ9
8. MOVING OR SPEAKING SO SLOWLY THAT OTHER PEOPLE COULD HAVE NOTICED. OR THE OPPOSITE, BEING SO FIGETY OR RESTLESS THAT YOU HAVE BEEN MOVING AROUND A LOT MORE THAN USUAL: 0
SUM OF ALL RESPONSES TO PHQ QUESTIONS 1-9: 2
2. FEELING DOWN, DEPRESSED OR HOPELESS: 1
3. TROUBLE FALLING OR STAYING ASLEEP: 0
10. IF YOU CHECKED OFF ANY PROBLEMS, HOW DIFFICULT HAVE THESE PROBLEMS MADE IT FOR YOU TO DO YOUR WORK, TAKE CARE OF THINGS AT HOME, OR GET ALONG WITH OTHER PEOPLE: 0
1. LITTLE INTEREST OR PLEASURE IN DOING THINGS: 1
SUM OF ALL RESPONSES TO PHQ9 QUESTIONS 1 & 2: 2
9. THOUGHTS THAT YOU WOULD BE BETTER OFF DEAD, OR OF HURTING YOURSELF: 0
SUM OF ALL RESPONSES TO PHQ QUESTIONS 1-9: 2
SUM OF ALL RESPONSES TO PHQ QUESTIONS 1-9: 2
7. TROUBLE CONCENTRATING ON THINGS, SUCH AS READING THE NEWSPAPER OR WATCHING TELEVISION: 0
6. FEELING BAD ABOUT YOURSELF - OR THAT YOU ARE A FAILURE OR HAVE LET YOURSELF OR YOUR FAMILY DOWN: 0
5. POOR APPETITE OR OVEREATING: 0
4. FEELING TIRED OR HAVING LITTLE ENERGY: 0
SUM OF ALL RESPONSES TO PHQ QUESTIONS 1-9: 2

## 2022-10-21 ASSESSMENT — SOCIAL DETERMINANTS OF HEALTH (SDOH): HOW HARD IS IT FOR YOU TO PAY FOR THE VERY BASICS LIKE FOOD, HOUSING, MEDICAL CARE, AND HEATING?: NOT HARD AT ALL

## 2022-10-21 ASSESSMENT — ENCOUNTER SYMPTOMS: BACK PAIN: 1
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
## 2022-10-27 ENCOUNTER — HOSPITAL ENCOUNTER (OUTPATIENT)
Dept: MRI IMAGING | Facility: CLINIC | Age: 77
Discharge: HOME | End: 2022-10-29
Payer: MEDICARE

## 2022-10-27 ENCOUNTER — HOSPITAL ENCOUNTER (OUTPATIENT)
Dept: WOMENS IMAGING | Age: 77
Discharge: HOME | End: 2022-10-29
Payer: MEDICARE

## 2022-10-27 ENCOUNTER — HOSPITAL ENCOUNTER (OUTPATIENT)
Dept: CT IMAGING | Age: 77
Discharge: HOME | End: 2022-10-29
Payer: MEDICARE

## 2022-10-27 VITALS — HEIGHT: 59 IN | WEIGHT: 114 LBS | BODY MASS INDEX: 22.98 KG/M2

## 2022-10-27 DIAGNOSIS — Z87.891 PERSONAL HISTORY OF NICOTINE DEPENDENCE: ICD-10-CM

## 2022-10-27 DIAGNOSIS — Z12.31 ENCOUNTER FOR SCREENING MAMMOGRAM FOR BREAST CANCER: ICD-10-CM

## 2022-10-27 DIAGNOSIS — G44.329 CHRONIC POST-TRAUMATIC HEADACHE, NOT INTRACTABLE: ICD-10-CM

## 2022-10-27 PROCEDURE — G1010 CDSM STANSON: HCPCS

## 2022-10-27 PROCEDURE — 77067 SCR MAMMO BI INCL CAD: CPT

## 2022-10-27 PROCEDURE — 71271 CT THORAX LUNG CANCER SCR C-: CPT

## 2022-10-28 NOTE — RESULT ENCOUNTER NOTE
MRI of the brain shows nothing new. She does have some cerebral microvascular disease.   Can discuss this further at next office visit

## 2022-12-05 DIAGNOSIS — M54.16 LUMBAR RADICULOPATHY, CHRONIC: ICD-10-CM

## 2022-12-05 DIAGNOSIS — M51.37 DEGENERATION OF LUMBAR OR LUMBOSACRAL INTERVERTEBRAL DISC: Chronic | ICD-10-CM

## 2022-12-05 DIAGNOSIS — M47.817 LUMBOSACRAL SPONDYLOSIS WITHOUT MYELOPATHY: Chronic | ICD-10-CM

## 2022-12-05 DIAGNOSIS — M48.061 SPINAL STENOSIS, LUMBAR REGION, WITHOUT NEUROGENIC CLAUDICATION: Chronic | ICD-10-CM

## 2022-12-05 RX ORDER — GABAPENTIN 100 MG/1
100 CAPSULE ORAL 3 TIMES DAILY
Qty: 270 CAPSULE | Refills: 0 | Status: SHIPPED | OUTPATIENT
Start: 2022-12-05 | End: 2023-03-05

## 2022-12-08 DIAGNOSIS — I70.90 ATHEROSCLEROSIS: Chronic | ICD-10-CM

## 2022-12-08 DIAGNOSIS — M81.0 MENOPAUSAL OSTEOPOROSIS: ICD-10-CM

## 2022-12-08 RX ORDER — BUSPIRONE HYDROCHLORIDE 10 MG/1
10 TABLET ORAL 3 TIMES DAILY
Qty: 270 TABLET | Refills: 0 | Status: SHIPPED | OUTPATIENT
Start: 2022-12-08 | End: 2023-03-08

## 2022-12-08 RX ORDER — CALCITONIN SALMON 200 [IU]/.09ML
SPRAY, METERED NASAL
Qty: 3 EACH | Refills: 3 | Status: SHIPPED | OUTPATIENT
Start: 2022-12-08

## 2022-12-08 RX ORDER — ATORVASTATIN CALCIUM 40 MG/1
40 TABLET, FILM COATED ORAL DAILY
Qty: 90 TABLET | Refills: 3 | Status: SHIPPED | OUTPATIENT
Start: 2022-12-08

## 2023-01-19 ENCOUNTER — APPOINTMENT (RX ONLY)
Dept: URBAN - METROPOLITAN AREA CLINIC 148 | Facility: CLINIC | Age: 78
Setting detail: DERMATOLOGY
End: 2023-01-19

## 2023-01-19 DIAGNOSIS — L82.0 INFLAMED SEBORRHEIC KERATOSIS: ICD-10-CM

## 2023-01-19 DIAGNOSIS — I87.2 VENOUS INSUFFICIENCY (CHRONIC) (PERIPHERAL): ICD-10-CM

## 2023-01-19 DIAGNOSIS — Z87.2 PERSONAL HISTORY OF DISEASES OF THE SKIN AND SUBCUTANEOUS TISSUE: ICD-10-CM

## 2023-01-19 DIAGNOSIS — D69.2 OTHER NONTHROMBOCYTOPENIC PURPURA: ICD-10-CM

## 2023-01-19 DIAGNOSIS — D22 MELANOCYTIC NEVI: ICD-10-CM

## 2023-01-19 DIAGNOSIS — D18.0 HEMANGIOMA: ICD-10-CM

## 2023-01-19 DIAGNOSIS — L82.1 OTHER SEBORRHEIC KERATOSIS: ICD-10-CM

## 2023-01-19 DIAGNOSIS — L81.4 OTHER MELANIN HYPERPIGMENTATION: ICD-10-CM

## 2023-01-19 PROBLEM — D22.5 MELANOCYTIC NEVI OF TRUNK: Status: ACTIVE | Noted: 2023-01-19

## 2023-01-19 PROBLEM — D22.62 MELANOCYTIC NEVI OF LEFT UPPER LIMB, INCLUDING SHOULDER: Status: ACTIVE | Noted: 2023-01-19

## 2023-01-19 PROBLEM — D18.01 HEMANGIOMA OF SKIN AND SUBCUTANEOUS TISSUE: Status: ACTIVE | Noted: 2023-01-19

## 2023-01-19 PROBLEM — D22.61 MELANOCYTIC NEVI OF RIGHT UPPER LIMB, INCLUDING SHOULDER: Status: ACTIVE | Noted: 2023-01-19

## 2023-01-19 PROCEDURE — ? LIQUID NITROGEN

## 2023-01-19 PROCEDURE — ? FULL BODY SKIN EXAM

## 2023-01-19 PROCEDURE — 17110 DESTRUCTION B9 LES UP TO 14: CPT

## 2023-01-19 PROCEDURE — ? SUNSCREEN RECOMMENDATIONS

## 2023-01-19 PROCEDURE — 99213 OFFICE O/P EST LOW 20 MIN: CPT | Mod: 25

## 2023-01-19 PROCEDURE — ? COUNSELING

## 2023-01-19 ASSESSMENT — LOCATION SIMPLE DESCRIPTION DERM
LOCATION SIMPLE: RIGHT PRETIBIAL REGION
LOCATION SIMPLE: LEFT THIGH
LOCATION SIMPLE: RIGHT UPPER ARM
LOCATION SIMPLE: RIGHT FOREARM
LOCATION SIMPLE: LEFT POSTERIOR UPPER ARM
LOCATION SIMPLE: RIGHT LOWER BACK
LOCATION SIMPLE: RIGHT POSTERIOR UPPER ARM
LOCATION SIMPLE: RIGHT UPPER BACK
LOCATION SIMPLE: LEFT PRETIBIAL REGION
LOCATION SIMPLE: UPPER BACK
LOCATION SIMPLE: LEFT UPPER ARM
LOCATION SIMPLE: ABDOMEN
LOCATION SIMPLE: LEFT CHEEK
LOCATION SIMPLE: RIGHT THIGH

## 2023-01-19 ASSESSMENT — LOCATION DETAILED DESCRIPTION DERM
LOCATION DETAILED: RIGHT DISTAL PRETIBIAL REGION
LOCATION DETAILED: RIGHT ANTERIOR DISTAL THIGH
LOCATION DETAILED: RIGHT PROXIMAL DORSAL FOREARM
LOCATION DETAILED: LEFT ANTERIOR PROXIMAL UPPER ARM
LOCATION DETAILED: RIGHT ANTERIOR DISTAL UPPER ARM
LOCATION DETAILED: RIGHT SUPERIOR MEDIAL MIDBACK
LOCATION DETAILED: LEFT ANTERIOR DISTAL THIGH
LOCATION DETAILED: PERIUMBILICAL SKIN
LOCATION DETAILED: LEFT DISTAL PRETIBIAL REGION
LOCATION DETAILED: SUPERIOR THORACIC SPINE
LOCATION DETAILED: LEFT DISTAL POSTERIOR UPPER ARM
LOCATION DETAILED: INFERIOR THORACIC SPINE
LOCATION DETAILED: RIGHT ANTERIOR PROXIMAL THIGH
LOCATION DETAILED: RIGHT DISTAL POSTERIOR UPPER ARM
LOCATION DETAILED: LEFT INFERIOR CENTRAL MALAR CHEEK
LOCATION DETAILED: RIGHT MEDIAL UPPER BACK

## 2023-01-19 ASSESSMENT — LOCATION ZONE DERM
LOCATION ZONE: LEG
LOCATION ZONE: FACE
LOCATION ZONE: ARM
LOCATION ZONE: TRUNK

## 2023-01-19 NOTE — PROCEDURE: LIQUID NITROGEN
Include Z78.9 (Other Specified Conditions Influencing Health Status) As An Associated Diagnosis?: No
Show Topical Anesthesia Variable?: Yes
Detail Level: Zone
Duration Of Freeze Thaw-Cycle (Seconds): 5-15
Post-Care Instructions: I reviewed with the patient in detail post-care instructions. Patient is to wear sunprotection, and avoid picking at any of the treated lesions. Pt may apply Vaseline to crusted or scabbing areas.
Spray Paint Text: The liquid nitrogen was applied to the skin utilizing a spray paint frosting technique.
Consent: The patient's consent was obtained including but not limited to risks of crusting, scabbing, blistering, scarring, darker or lighter pigmentary change, recurrence, incomplete removal and infection.
Medical Necessity Information: It is in your best interest to select a reason for this procedure from the list below. All of these items fulfill various CMS LCD requirements except the new and changing color options.
Medical Necessity Clause: This procedure was medically necessary because the lesions that were treated were:
Number Of Freeze-Thaw Cycles: 2 freeze-thaw cycles

## 2023-01-30 RX ORDER — BUSPIRONE HYDROCHLORIDE 10 MG/1
10 TABLET ORAL 3 TIMES DAILY
Qty: 270 TABLET | Refills: 0 | Status: SHIPPED | OUTPATIENT
Start: 2023-01-30 | End: 2023-04-30

## 2023-02-05 DIAGNOSIS — M54.16 LUMBAR RADICULOPATHY, CHRONIC: ICD-10-CM

## 2023-02-05 DIAGNOSIS — M47.817 LUMBOSACRAL SPONDYLOSIS WITHOUT MYELOPATHY: Chronic | ICD-10-CM

## 2023-02-05 DIAGNOSIS — M51.37 DEGENERATION OF LUMBAR OR LUMBOSACRAL INTERVERTEBRAL DISC: Chronic | ICD-10-CM

## 2023-02-05 DIAGNOSIS — M48.061 SPINAL STENOSIS, LUMBAR REGION, WITHOUT NEUROGENIC CLAUDICATION: Chronic | ICD-10-CM

## 2023-02-06 RX ORDER — GABAPENTIN 100 MG/1
CAPSULE ORAL
Qty: 270 CAPSULE | Refills: 3 | OUTPATIENT
Start: 2023-02-06

## 2023-06-08 ENCOUNTER — OFFICE VISIT (OUTPATIENT)
Dept: PRIMARY CARE CLINIC | Age: 78
End: 2023-06-08
Payer: MEDICARE

## 2023-06-08 VITALS
OXYGEN SATURATION: 96 % | HEART RATE: 78 BPM | SYSTOLIC BLOOD PRESSURE: 130 MMHG | DIASTOLIC BLOOD PRESSURE: 78 MMHG | HEIGHT: 59 IN | BODY MASS INDEX: 22.26 KG/M2 | WEIGHT: 110.4 LBS

## 2023-06-08 DIAGNOSIS — F33.41 RECURRENT MAJOR DEPRESSIVE DISORDER, IN PARTIAL REMISSION (HCC): ICD-10-CM

## 2023-06-08 DIAGNOSIS — M81.0 MENOPAUSAL OSTEOPOROSIS: ICD-10-CM

## 2023-06-08 DIAGNOSIS — I67.89 CEREBRAL MICROVASCULAR DISEASE: Chronic | ICD-10-CM

## 2023-06-08 DIAGNOSIS — N18.30 STAGE 3 CHRONIC KIDNEY DISEASE, UNSPECIFIED WHETHER STAGE 3A OR 3B CKD (HCC): ICD-10-CM

## 2023-06-08 DIAGNOSIS — E78.5 HYPERLIPIDEMIA, UNSPECIFIED HYPERLIPIDEMIA TYPE: Primary | ICD-10-CM

## 2023-06-08 PROCEDURE — 99214 OFFICE O/P EST MOD 30 MIN: CPT | Performed by: FAMILY MEDICINE

## 2023-06-08 PROCEDURE — G8420 CALC BMI NORM PARAMETERS: HCPCS | Performed by: FAMILY MEDICINE

## 2023-06-08 PROCEDURE — 1123F ACP DISCUSS/DSCN MKR DOCD: CPT | Performed by: FAMILY MEDICINE

## 2023-06-08 PROCEDURE — 1090F PRES/ABSN URINE INCON ASSESS: CPT | Performed by: FAMILY MEDICINE

## 2023-06-08 PROCEDURE — G8427 DOCREV CUR MEDS BY ELIG CLIN: HCPCS | Performed by: FAMILY MEDICINE

## 2023-06-08 PROCEDURE — G8399 PT W/DXA RESULTS DOCUMENT: HCPCS | Performed by: FAMILY MEDICINE

## 2023-06-08 PROCEDURE — 4004F PT TOBACCO SCREEN RCVD TLK: CPT | Performed by: FAMILY MEDICINE

## 2023-06-08 RX ORDER — BACLOFEN 10 MG/1
TABLET ORAL
COMMUNITY
Start: 2023-06-01

## 2023-06-08 RX ORDER — HYDROCODONE BITARTRATE AND ACETAMINOPHEN 5; 325 MG/1; MG/1
TABLET ORAL
COMMUNITY
Start: 2023-06-01

## 2023-06-08 SDOH — ECONOMIC STABILITY: FOOD INSECURITY: WITHIN THE PAST 12 MONTHS, YOU WORRIED THAT YOUR FOOD WOULD RUN OUT BEFORE YOU GOT MONEY TO BUY MORE.: NEVER TRUE

## 2023-06-08 SDOH — ECONOMIC STABILITY: INCOME INSECURITY: HOW HARD IS IT FOR YOU TO PAY FOR THE VERY BASICS LIKE FOOD, HOUSING, MEDICAL CARE, AND HEATING?: NOT HARD AT ALL

## 2023-06-08 SDOH — ECONOMIC STABILITY: HOUSING INSECURITY
IN THE LAST 12 MONTHS, WAS THERE A TIME WHEN YOU DID NOT HAVE A STEADY PLACE TO SLEEP OR SLEPT IN A SHELTER (INCLUDING NOW)?: NO

## 2023-06-08 SDOH — ECONOMIC STABILITY: FOOD INSECURITY: WITHIN THE PAST 12 MONTHS, THE FOOD YOU BOUGHT JUST DIDN'T LAST AND YOU DIDN'T HAVE MONEY TO GET MORE.: NEVER TRUE

## 2023-06-08 ASSESSMENT — PATIENT HEALTH QUESTIONNAIRE - PHQ9
SUM OF ALL RESPONSES TO PHQ QUESTIONS 1-9: 1
SUM OF ALL RESPONSES TO PHQ QUESTIONS 1-9: 1
9. THOUGHTS THAT YOU WOULD BE BETTER OFF DEAD, OR OF HURTING YOURSELF: 0
1. LITTLE INTEREST OR PLEASURE IN DOING THINGS: 0
7. TROUBLE CONCENTRATING ON THINGS, SUCH AS READING THE NEWSPAPER OR WATCHING TELEVISION: 0
5. POOR APPETITE OR OVEREATING: 0
2. FEELING DOWN, DEPRESSED OR HOPELESS: 0
SUM OF ALL RESPONSES TO PHQ QUESTIONS 1-9: 1
8. MOVING OR SPEAKING SO SLOWLY THAT OTHER PEOPLE COULD HAVE NOTICED. OR THE OPPOSITE, BEING SO FIGETY OR RESTLESS THAT YOU HAVE BEEN MOVING AROUND A LOT MORE THAN USUAL: 0
4. FEELING TIRED OR HAVING LITTLE ENERGY: 1
3. TROUBLE FALLING OR STAYING ASLEEP: 0
10. IF YOU CHECKED OFF ANY PROBLEMS, HOW DIFFICULT HAVE THESE PROBLEMS MADE IT FOR YOU TO DO YOUR WORK, TAKE CARE OF THINGS AT HOME, OR GET ALONG WITH OTHER PEOPLE: 0
SUM OF ALL RESPONSES TO PHQ9 QUESTIONS 1 & 2: 0
SUM OF ALL RESPONSES TO PHQ QUESTIONS 1-9: 1
6. FEELING BAD ABOUT YOURSELF - OR THAT YOU ARE A FAILURE OR HAVE LET YOURSELF OR YOUR FAMILY DOWN: 0

## 2023-06-08 NOTE — PROGRESS NOTES
Cigarettes    Smokeless tobacco: Never    Tobacco comments:     1 pack a day   Substance Use Topics    Alcohol use: Yes     Comment: occ. wine or rum. Current Outpatient Medications   Medication Sig Dispense Refill    calcitonin (MIACALCIN) 200 UNIT/ACT nasal spray USE 1 SPRAY NASALLY DAILY 3 each 3    atorvastatin (LIPITOR) 40 MG tablet Take 1 tablet by mouth daily 90 tablet 3    gabapentin (NEURONTIN) 100 MG capsule Take 1 capsule by mouth 3 times daily for 90 days. TAKE ONE CAPSULE BY MOUTH THREE TIMES DAILY 270 capsule 0    buPROPion (WELLBUTRIN XL) 300 MG extended release tablet TAKE 1 TABLET BY MOUTH EVERY MORNING 90 tablet 1    Handicap Placard MISC by Does not apply route Cannot walk 200 feet due to orthopedic/back pain  Expires in 5 years 4/22/26 1 each 0    citalopram (CELEXA) 20 MG tablet Take 1 tablet by mouth daily 90 tablet 0    aspirin (ASPIRIN CHILDRENS) 81 MG chewable tablet Take 1 tablet by mouth daily 30 tablet 3    Acetaminophen (TYLENOL 8 HOUR ARTHRITIS PAIN PO) Take by mouth      Multiple Vitamins-Minerals (MULTIVITAMIN ADULT PO) Take by mouth      Calcium Carb-Cholecalciferol (CALCIUM + D3) 600-200 MG-UNIT TABS tablet Take 1 tablet by mouth Daily with lunch      baclofen (LIORESAL) 10 MG tablet TAKE 1 TABLET BY MOUTH TWICE DAILY AS DIRECTED      HYDROcodone-acetaminophen (NORCO) 5-325 MG per tablet TAKE 1 TABLET BY MOUTH TWICE DAILY AS NEEDED FOR 30 DAYS       No current facility-administered medications for this visit.      No Known Allergies    Health Maintenance   Topic Date Due    COVID-19 Vaccine (4 - Booster for Pf[employer] series) 02/21/2022    Lipids  05/13/2023    Depression Monitoring  10/21/2023    Low dose CT lung screening &/or counseling  10/27/2023    Annual Wellness Visit (AWV)  02/01/2024    DTaP/Tdap/Td vaccine (2 - Td or Tdap) 10/01/2025    DEXA (modify frequency per FRAX score)  Completed    Flu vaccine  Completed    Shingles vaccine  Completed    Pneumococcal 65+ years

## 2023-06-09 DIAGNOSIS — G44.329 CHRONIC POST-TRAUMATIC HEADACHE, NOT INTRACTABLE: ICD-10-CM

## 2023-06-09 DIAGNOSIS — M62.838 TRAPEZIUS MUSCLE SPASM: ICD-10-CM

## 2023-06-09 RX ORDER — TIZANIDINE 2 MG/1
2 TABLET ORAL 4 TIMES DAILY PRN
Qty: 40 TABLET | Refills: 0 | Status: SHIPPED | OUTPATIENT
Start: 2023-06-09 | End: 2023-06-19

## 2023-09-27 ENCOUNTER — TELEPHONE (OUTPATIENT)
Dept: ONCOLOGY | Age: 78
End: 2023-09-27

## 2023-09-27 DIAGNOSIS — Z87.891 PERSONAL HISTORY OF NICOTINE DEPENDENCE: Primary | ICD-10-CM

## 2023-09-27 NOTE — TELEPHONE ENCOUNTER
Our records indicate that your patient is coming due for their annual lung cancer screening follow up testing. For your convenience, we have pended the order for the scan for you. If you do not agree with the need for the test, please cancel the order and let us know. Sincerely,    [address] Screening Program    Auto printed reminder letter sent to patient.

## 2023-10-19 NOTE — PROGRESS NOTES
[facility] PRIMARY CARE  [address] [address]  Dept: [phone]    Leopoldo Rams is a 77 y.o. female Established patient, who presents today for her medical conditions/complaintsas noted below. Chief Complaint   Patient presents with    Anxiety     med check       HPI:     HPI    Reviewed prior notes None  Reviewed previous Labs  Uses buspar daily and not usually tid  Reviewed all meds  See PM    Feels ok, a lot of stress, is a caregiver for SO    Had rectal incontinence , rarely occurs, had no sensation she was going. LDL Cholesterol (mg/dL)   Date Value   04/28/2021 56     LDL Calculated (mg/dL)   Date Value   06/13/2017 96       (goal LDL is <100)   AST (U/L)   Date Value   04/28/2021 16     ALT (U/L)   Date Value   04/28/2021 12     BUN (mg/dL)   Date Value   10/01/2020 21     TSH (mIU/L)   Date Value   04/22/2021 1.68     BP Readings from Last 3 Encounters:   10/07/21 104/66   07/22/21 132/64   04/22/21 130/60          (goal 120/80)    Past Medical History:   Diagnosis Date    Anxiety     Cancer [facility])     breast cancer, left     CKD (chronic kidney disease) stage 3, GFR 30-59 ml/min ([facility])       Past Surgical History:   Procedure Laterality Date    APPENDECTOMY      BREAST LUMPECTOMY      right fatty tissue removed    BREAST REDUCTION SURGERY Right     BREAST SURGERY Left     left mastectomy w/reconstruction, tram    CATARACT REMOVAL Bilateral 03/03/2016    CHOLECYSTECTOMY      COLONOSCOPY      COLONOSCOPY  10/13/2016    Dr Stephanie Conway    TUBAL LIGATION         Family History   Problem Relation Age of Onset    Heart Disease Father        Social History     Tobacco Use    Smoking status: Current Every Day Smoker     Packs/day: 1.00     Years: 32.00     Pack years: 32.00    Smokeless tobacco: Never Used    Tobacco comment: 1 pack a day   Substance Use Topics    Alcohol use: Yes     Comment: occ. wine or rum.       Current Outpatient Medications   Medication Sig Dispense Refill    buPROPion (WELLBUTRIN XL) 300 MG extended release tablet TAKE 1 TABLET BY MOUTH EVERY MORNING 90 tablet 1    HYDROcodone-acetaminophen (NORCO) 5-325 MG per tablet Take 1 tablet by mouth 2 times daily as needed for Pain for up to 30 days. 60 tablet 0    calcitonin (MIACALCIN) 200 UNIT/ACT nasal spray 1 spray by Nasal route daily 3 Bottle 3    atorvastatin (LIPITOR) 40 MG tablet Take 1 tablet by mouth daily 90 tablet 3    Handicap Placard MISC by Does not apply route Cannot walk 200 feet due to orthopedic/back pain  Expires in 5 years 4/22/26 1 each 0    citalopram (CELEXA) 20 MG tablet Take 1 tablet by mouth daily 90 tablet 0    aspirin (ASPIRIN CHILDRENS) 81 MG chewable tablet Take 1 tablet by mouth daily 30 tablet 3    Acetaminophen (TYLENOL 8 HOUR ARTHRITIS PAIN PO) Take by mouth      Multiple Vitamins-Minerals (MULTIVITAMIN ADULT PO) Take by mouth      Calcium Carb-Cholecalciferol (CALCIUM + D3) 600-200 MG-UNIT TABS tablet Take 1 tablet by mouth Daily with lunch       No current facility-administered medications for this visit. No Known Allergies    Health Maintenance   Topic Date Due    Annual Wellness Visit (AWV)  Never done    Potassium monitoring  10/01/2021    Creatinine monitoring  10/01/2021    Low dose CT lung screening  10/01/2021    Lipid screen  04/28/2022    DTaP/Tdap/Td vaccine (2 - Td or Tdap) 10/01/2025    Colon cancer screen colonoscopy  10/13/2026    DEXA (modify frequency per FRAX score)  Completed    Flu vaccine  Completed    Shingles Vaccine  Completed    Pneumococcal 65+ years Vaccine  Completed    COVID-19 Vaccine  Completed    Hepatitis C screen  Completed    Hepatitis A vaccine  Aged Out    Hepatitis B vaccine  Aged Out    Hib vaccine  Aged Out    Meningococcal (ACWY) vaccine  Aged Out       Subjective:      Review of Systems   Respiratory: Negative.     Cardiovascular: Negative.    goes to [facility] in the winter      Objective:     /66   Pulse 88   Ht 4' 11\" (1.499 m)   Wt 109 lb 9.6 oz (49.7 kg)   SpO2 98%   BMI 22.14 kg/m²   Physical Exam  Vitals and nursing note reviewed. Constitutional:       General: She is not in acute distress. Appearance: She is well-developed. She is not ill-appearing. HENT:      Head: Normocephalic and atraumatic. Right Ear: External ear normal.      Left Ear: External ear normal.   Eyes:      General: No scleral icterus. Right eye: No discharge. Left eye: No discharge. Conjunctiva/sclera: Conjunctivae normal.      Pupils: Pupils are equal, round, and reactive to light. Neck:      Thyroid: No thyromegaly. Trachea: No tracheal deviation. Cardiovascular:      Rate and Rhythm: Normal rate and regular rhythm. Pulses: Normal pulses. Heart sounds: Normal heart sounds. Pulmonary:      Effort: Pulmonary effort is normal. No respiratory distress. Breath sounds: Normal breath sounds. No wheezing. Musculoskeletal:      Right lower leg: No edema. Left lower leg: No edema. Lymphadenopathy:      Cervical: No cervical adenopathy. Skin:     General: Skin is warm. Findings: No rash. Neurological:      Mental Status: She is alert and oriented to person, place, and time. Psychiatric:         Mood and Affect: Mood normal.         Behavior: Behavior normal.         Thought Content: Thought content normal.         Assessment:       Diagnosis Orders   1. Medication management     2. Recurrent major depressive disorder, in partial remission ([facility] [address]     3. Anxiety     4. Need for vaccination  INFLUENZA, QUADV, ADJUVANTED, 65 YRS =, IM, PF, PREFILL SYR, 0.5ML (FLUAD)   5. Encounter for screening mammogram for breast cancer  SOLOMON DIGITAL SCREEN W OR WO CAD BILATERAL        Plan:      No follow-ups on file. Labs due in April.    Orders Placed This Encounter   Procedures    SOLOMON DIGITAL SCREEN W OR WO CAD BILATERAL     Standing Status:   Future     Standing Expiration Date:   10/7/2022     Scheduling Instructions:      Dx Code  Z12.31     Order Specific Question:   Reason for exam:     Answer:   screening    INFLUENZA, QUADV, ADJUVANTED, 72 YRS =, IM, PF, PREFILL SYR, 0.5ML (FLUAD)     No orders of the defined types were placed in this encounter. Patient given educationalmaterials - see patient instructions. Discussed use, benefit, and side effectsof prescribed medications. All patient questions answered. Pt voiced understanding. Reviewed health maintenance. Instructed to continue current medications, diet  Patient agreed with treatment plan. Follow up as directed.      Electronicallysigned by Surekha Glass MD on 10/7/2021 at 2:23 PM Wound Care: [place]

## 2023-12-27 ENCOUNTER — HOSPITAL ENCOUNTER (OUTPATIENT)
Dept: WOMENS IMAGING | Age: 78
Discharge: HOME | End: 2023-12-29
Payer: MEDICARE

## 2023-12-27 ENCOUNTER — HOSPITAL ENCOUNTER (OUTPATIENT)
Dept: CT IMAGING | Age: 78
Discharge: HOME | End: 2023-12-29
Payer: MEDICARE

## 2023-12-27 VITALS — WEIGHT: 108 LBS | HEIGHT: 59 IN | BODY MASS INDEX: 21.77 KG/M2

## 2023-12-27 DIAGNOSIS — Z87.891 PERSONAL HISTORY OF NICOTINE DEPENDENCE: ICD-10-CM

## 2023-12-27 DIAGNOSIS — Z12.31 ENCOUNTER FOR SCREENING MAMMOGRAM FOR MALIGNANT NEOPLASM OF BREAST: ICD-10-CM

## 2023-12-27 PROCEDURE — 71271 CT THORAX LUNG CANCER SCR C-: CPT

## 2023-12-27 PROCEDURE — 77063 BREAST TOMOSYNTHESIS BI: CPT

## 2024-01-05 ENCOUNTER — HOSPITAL ENCOUNTER (OUTPATIENT)
Age: 79
Setting detail: SPECIMEN
Discharge: HOME | End: 2024-01-05

## 2024-01-05 DIAGNOSIS — E78.5 HYPERLIPIDEMIA, UNSPECIFIED HYPERLIPIDEMIA TYPE: ICD-10-CM

## 2024-01-05 DIAGNOSIS — M81.0 MENOPAUSAL OSTEOPOROSIS: ICD-10-CM

## 2024-01-05 LAB
ALBUMIN SERPL-MCNC: 4.1 G/DL (ref 3.5–5.2)
ALBUMIN/GLOB SERPL: 2 {RATIO} (ref 1–2.5)
ALP SERPL-CCNC: 109 U/L (ref 35–104)
ALT SERPL-CCNC: 19 U/L (ref 10–35)
ANION GAP SERPL CALCULATED.3IONS-SCNC: 10 MMOL/L (ref 9–16)
AST SERPL-CCNC: 23 U/L (ref 10–35)
BILIRUB SERPL-MCNC: 0.3 MG/DL (ref 0–1.2)
BUN SERPL-MCNC: 24 MG/DL (ref 8–23)
CALCIUM SERPL-MCNC: 8.9 MG/DL (ref 8.6–10.4)
CHLORIDE SERPL-SCNC: 107 MMOL/L (ref 98–107)
CHOLEST SERPL-MCNC: 134 MG/DL (ref 0–199)
CHOLESTEROL/HDL RATIO: 2
CO2 SERPL-SCNC: 24 MMOL/L (ref 20–31)
CREAT SERPL-MCNC: 1.7 MG/DL (ref 0.5–0.9)
GFR SERPL CREATININE-BSD FRML MDRD: 32 ML/MIN/1.73M2
GLUCOSE P FAST SERPL-MCNC: 104 MG/DL (ref 74–99)
HDLC SERPL-MCNC: 69 MG/DL
LDLC SERPL CALC-MCNC: 50 MG/DL (ref 0–100)
POTASSIUM SERPL-SCNC: 4.8 MMOL/L (ref 3.7–5.3)
PROT SERPL-MCNC: 6.5 G/DL (ref 6.6–8.7)
SODIUM SERPL-SCNC: 141 MMOL/L (ref 136–145)
TRIGL SERPL-MCNC: 76 MG/DL
VLDLC SERPL CALC-MCNC: 15 MG/DL

## 2024-01-05 NOTE — RESULT ENCOUNTER NOTE
Creatinine ( kidney function ) is a little worse. She needs to see a nephrologist. If she doesn't have one then refer to any of the [facility] nephrologists  Sugar a little above normal.   Protein a little below normal. Try to have more protein.   Cholesterol is fine

## 2024-01-09 DIAGNOSIS — I70.90 ATHEROSCLEROSIS: Chronic | ICD-10-CM

## 2024-01-09 RX ORDER — ATORVASTATIN CALCIUM 40 MG/1
40 TABLET, FILM COATED ORAL DAILY
Qty: 90 TABLET | Refills: 3 | Status: SHIPPED | OUTPATIENT
Start: 2024-01-09

## 2024-05-21 ENCOUNTER — APPOINTMENT (RX ONLY)
Dept: URBAN - METROPOLITAN AREA CLINIC 148 | Facility: CLINIC | Age: 79
Setting detail: DERMATOLOGY
End: 2024-05-21

## 2024-05-21 DIAGNOSIS — L57.0 ACTINIC KERATOSIS: ICD-10-CM

## 2024-05-21 DIAGNOSIS — L82.1 OTHER SEBORRHEIC KERATOSIS: ICD-10-CM

## 2024-05-21 DIAGNOSIS — D18.0 HEMANGIOMA: ICD-10-CM

## 2024-05-21 DIAGNOSIS — D69.2 OTHER NONTHROMBOCYTOPENIC PURPURA: ICD-10-CM

## 2024-05-21 DIAGNOSIS — I87.2 VENOUS INSUFFICIENCY (CHRONIC) (PERIPHERAL): ICD-10-CM

## 2024-05-21 DIAGNOSIS — L81.4 OTHER MELANIN HYPERPIGMENTATION: ICD-10-CM

## 2024-05-21 DIAGNOSIS — D22 MELANOCYTIC NEVI: ICD-10-CM

## 2024-05-21 PROBLEM — D22.5 MELANOCYTIC NEVI OF TRUNK: Status: ACTIVE | Noted: 2024-05-21

## 2024-05-21 PROBLEM — D22.61 MELANOCYTIC NEVI OF RIGHT UPPER LIMB, INCLUDING SHOULDER: Status: ACTIVE | Noted: 2024-05-21

## 2024-05-21 PROBLEM — D22.62 MELANOCYTIC NEVI OF LEFT UPPER LIMB, INCLUDING SHOULDER: Status: ACTIVE | Noted: 2024-05-21

## 2024-05-21 PROBLEM — D18.01 HEMANGIOMA OF SKIN AND SUBCUTANEOUS TISSUE: Status: ACTIVE | Noted: 2024-05-21

## 2024-05-21 PROCEDURE — 99213 OFFICE O/P EST LOW 20 MIN: CPT | Mod: 25

## 2024-05-21 PROCEDURE — ? SUNSCREEN RECOMMENDATIONS

## 2024-05-21 PROCEDURE — ? COUNSELING

## 2024-05-21 PROCEDURE — ? LIQUID NITROGEN

## 2024-05-21 PROCEDURE — 17000 DESTRUCT PREMALG LESION: CPT

## 2024-05-21 PROCEDURE — ? FULL BODY SKIN EXAM

## 2024-05-21 ASSESSMENT — LOCATION DETAILED DESCRIPTION DERM
LOCATION DETAILED: RIGHT ANTERIOR PROXIMAL THIGH
LOCATION DETAILED: INFERIOR THORACIC SPINE
LOCATION DETAILED: LEFT ANTERIOR DISTAL THIGH
LOCATION DETAILED: PERIUMBILICAL SKIN
LOCATION DETAILED: RIGHT PROXIMAL PRETIBIAL REGION
LOCATION DETAILED: RIGHT DISTAL POSTERIOR UPPER ARM
LOCATION DETAILED: RIGHT SUPERIOR MEDIAL MIDBACK
LOCATION DETAILED: LEFT ANTERIOR PROXIMAL UPPER ARM
LOCATION DETAILED: RIGHT MEDIAL UPPER BACK
LOCATION DETAILED: LEFT DISTAL PRETIBIAL REGION
LOCATION DETAILED: RIGHT DISTAL DORSAL FOREARM
LOCATION DETAILED: LEFT PROXIMAL DORSAL FOREARM
LOCATION DETAILED: LEFT CENTRAL TEMPLE
LOCATION DETAILED: SUPERIOR THORACIC SPINE
LOCATION DETAILED: RIGHT DISTAL PRETIBIAL REGION
LOCATION DETAILED: RIGHT ANTERIOR DISTAL UPPER ARM
LOCATION DETAILED: LEFT PROXIMAL PRETIBIAL REGION
LOCATION DETAILED: LEFT DISTAL POSTERIOR UPPER ARM

## 2024-05-21 ASSESSMENT — LOCATION SIMPLE DESCRIPTION DERM
LOCATION SIMPLE: LEFT TEMPLE
LOCATION SIMPLE: RIGHT PRETIBIAL REGION
LOCATION SIMPLE: RIGHT THIGH
LOCATION SIMPLE: RIGHT POSTERIOR UPPER ARM
LOCATION SIMPLE: ABDOMEN
LOCATION SIMPLE: RIGHT UPPER BACK
LOCATION SIMPLE: LEFT FOREARM
LOCATION SIMPLE: LEFT PRETIBIAL REGION
LOCATION SIMPLE: LEFT UPPER ARM
LOCATION SIMPLE: LEFT POSTERIOR UPPER ARM
LOCATION SIMPLE: RIGHT FOREARM
LOCATION SIMPLE: RIGHT LOWER BACK
LOCATION SIMPLE: LEFT THIGH
LOCATION SIMPLE: UPPER BACK
LOCATION SIMPLE: RIGHT UPPER ARM

## 2024-05-21 ASSESSMENT — LOCATION ZONE DERM
LOCATION ZONE: FACE
LOCATION ZONE: TRUNK
LOCATION ZONE: LEG
LOCATION ZONE: ARM

## 2024-05-21 NOTE — PROCEDURE: LIQUID NITROGEN
Duration Of Freeze Thaw-Cycle (Seconds): 2
Show Aperture Variable?: Yes
Consent: The patient's consent was obtained including but not limited to risks of crusting, scabbing, blistering, scarring, darker or lighter pigmentary change, recurrence, incomplete removal and infection.
Post-Care Instructions: I reviewed with the patient in detail post-care instructions. Patient is to wear sunprotection, and avoid picking at any of the treated lesions. Pt may apply Vaseline to crusted or scabbing areas.
Render Post-Care Instructions In Note?: no
Number Of Freeze-Thaw Cycles: 2 freeze-thaw cycles
Detail Level: Zone

## 2024-05-29 ENCOUNTER — APPOINTMENT (RX ONLY)
Dept: URBAN - METROPOLITAN AREA CLINIC 148 | Facility: CLINIC | Age: 79
Setting detail: DERMATOLOGY
End: 2024-05-29

## 2024-05-29 DIAGNOSIS — Z41.9 ENCOUNTER FOR PROCEDURE FOR PURPOSES OTHER THAN REMEDYING HEALTH STATE, UNSPECIFIED: ICD-10-CM

## 2024-05-29 PROCEDURE — ? COSMETIC CONSULTATION: CHEMICAL PEELS

## 2024-05-29 NOTE — PROCEDURE: COSMETIC CONSULTATION: CHEMICAL PEELS
Send Procedure Quote As Charge: No
Procedure Quote $ (Will Render In Note. Use Numbers Only, No Text Please.): 319.00
Detail Level: Detailed

## 2024-06-06 ENCOUNTER — APPOINTMENT (RX ONLY)
Dept: URBAN - METROPOLITAN AREA CLINIC 333 | Facility: CLINIC | Age: 79
Setting detail: DERMATOLOGY
End: 2024-06-06

## 2024-06-06 DIAGNOSIS — Z41.9 ENCOUNTER FOR PROCEDURE FOR PURPOSES OTHER THAN REMEDYING HEALTH STATE, UNSPECIFIED: ICD-10-CM

## 2024-06-06 PROCEDURE — ? OTHER (COSMETIC)

## 2024-06-06 NOTE — PROCEDURE: OTHER (COSMETIC)
Other (Free Text): Patient came in with concerns of deep wrinkles around mouth area and puffiness around eyes.\\n\\nWe spoke about different options for around the mouth area.\\n1st was to do CO2 laser with Christy for $2500\\n2nd was CoolPeel series of 3 txs spaced out over 3 months (1st tx $1120, 2nd tx $900, 3rd tx $900)\\n3rd was Ale Versa series of 6 txs, over 6 months ($500 per tx and skincare is included I package of 4 or more txs.)\\n\\nPatient will consider doing Co2 and has scheduled consult with Christy.\\n\\nFor concerns around eye area we spoke about doing lymphatic drainage with HydraFacial or doing it at home with her serums and the back of a metal spoon.\\n\\nMade her aware that skincare will be very important in maintaining her results from any of the options above and we will do a HydraFacial 30-40 post Co2 and start on a skin care routine.
Detail Level: Zone

## 2024-06-24 ASSESSMENT — ENCOUNTER SYMPTOMS: DIARRHEA: 1

## 2024-06-25 SDOH — ECONOMIC STABILITY: FOOD INSECURITY: WITHIN THE PAST 12 MONTHS, YOU WORRIED THAT YOUR FOOD WOULD RUN OUT BEFORE YOU GOT MONEY TO BUY MORE.: NEVER TRUE

## 2024-06-25 SDOH — ECONOMIC STABILITY: FOOD INSECURITY: WITHIN THE PAST 12 MONTHS, THE FOOD YOU BOUGHT JUST DIDN'T LAST AND YOU DIDN'T HAVE MONEY TO GET MORE.: NEVER TRUE

## 2024-06-25 SDOH — ECONOMIC STABILITY: TRANSPORTATION INSECURITY
IN THE PAST 12 MONTHS, HAS LACK OF TRANSPORTATION KEPT YOU FROM MEETINGS, WORK, OR FROM GETTING THINGS NEEDED FOR DAILY LIVING?: NO

## 2024-06-25 SDOH — ECONOMIC STABILITY: INCOME INSECURITY: HOW HARD IS IT FOR YOU TO PAY FOR THE VERY BASICS LIKE FOOD, HOUSING, MEDICAL CARE, AND HEATING?: NOT HARD AT ALL

## 2024-06-25 ASSESSMENT — PATIENT HEALTH QUESTIONNAIRE - PHQ9
2. FEELING DOWN, DEPRESSED OR HOPELESS: MORE THAN HALF THE DAYS
3. TROUBLE FALLING OR STAYING ASLEEP: SEVERAL DAYS
7. TROUBLE CONCENTRATING ON THINGS, SUCH AS READING THE NEWSPAPER OR WATCHING TELEVISION: SEVERAL DAYS
SUM OF ALL RESPONSES TO PHQ QUESTIONS 1-9: 8
8. MOVING OR SPEAKING SO SLOWLY THAT OTHER PEOPLE COULD HAVE NOTICED. OR THE OPPOSITE, BEING SO FIGETY OR RESTLESS THAT YOU HAVE BEEN MOVING AROUND A LOT MORE THAN USUAL: NOT AT ALL
4. FEELING TIRED OR HAVING LITTLE ENERGY: SEVERAL DAYS
5. POOR APPETITE OR OVEREATING: SEVERAL DAYS
SUM OF ALL RESPONSES TO PHQ QUESTIONS 1-9: 8
10. IF YOU CHECKED OFF ANY PROBLEMS, HOW DIFFICULT HAVE THESE PROBLEMS MADE IT FOR YOU TO DO YOUR WORK, TAKE CARE OF THINGS AT HOME, OR GET ALONG WITH OTHER PEOPLE: SOMEWHAT DIFFICULT
10. IF YOU CHECKED OFF ANY PROBLEMS, HOW DIFFICULT HAVE THESE PROBLEMS MADE IT FOR YOU TO DO YOUR WORK, TAKE CARE OF THINGS AT HOME, OR GET ALONG WITH OTHER PEOPLE: SOMEWHAT DIFFICULT
9. THOUGHTS THAT YOU WOULD BE BETTER OFF DEAD, OR OF HURTING YOURSELF: NOT AT ALL
SUM OF ALL RESPONSES TO PHQ QUESTIONS 1-9: 8
SUM OF ALL RESPONSES TO PHQ9 QUESTIONS 1 & 2: 4
8. MOVING OR SPEAKING SO SLOWLY THAT OTHER PEOPLE COULD HAVE NOTICED. OR THE OPPOSITE - BEING SO FIDGETY OR RESTLESS THAT YOU HAVE BEEN MOVING AROUND A LOT MORE THAN USUAL: NOT AT ALL
1. LITTLE INTEREST OR PLEASURE IN DOING THINGS: MORE THAN HALF THE DAYS
6. FEELING BAD ABOUT YOURSELF - OR THAT YOU ARE A FAILURE OR HAVE LET YOURSELF OR YOUR FAMILY DOWN: NOT AT ALL
SUM OF ALL RESPONSES TO PHQ QUESTIONS 1-9: 8
5. POOR APPETITE OR OVEREATING: SEVERAL DAYS
SUM OF ALL RESPONSES TO PHQ QUESTIONS 1-9: 8
4. FEELING TIRED OR HAVING LITTLE ENERGY: SEVERAL DAYS
6. FEELING BAD ABOUT YOURSELF - OR THAT YOU ARE A FAILURE OR HAVE LET YOURSELF OR YOUR FAMILY DOWN: NOT AT ALL
2. FEELING DOWN, DEPRESSED OR HOPELESS: MORE THAN HALF THE DAYS
3. TROUBLE FALLING OR STAYING ASLEEP: SEVERAL DAYS
1. LITTLE INTEREST OR PLEASURE IN DOING THINGS: MORE THAN HALF THE DAYS
9. THOUGHTS THAT YOU WOULD BE BETTER OFF DEAD, OR OF HURTING YOURSELF: NOT AT ALL
7. TROUBLE CONCENTRATING ON THINGS, SUCH AS READING THE NEWSPAPER OR WATCHING TELEVISION: SEVERAL DAYS

## 2024-06-25 NOTE — PROGRESS NOTES
[facility]  [facility] PRIMARY CARE  [address] [address]  Dept: [phone]    Pilar Jeong is a 79 y.o. female who presents today for her medical conditions/complaints as noted below.      Chief Complaint   Patient presents with    Diarrhea     Pt is here due to diarrhea - pt states started X6 months ago, pt has loss of appetite and weight loss - pt state she also has stomach pain off and on through out the day - rate 5/10 sometimes higher        HPI:     HPI  Having diarrhea for 6 months. Comes and goes, sometimes lasts for days. No constipation. Can be different colors, no blood or black stools . Decreased her iron but not sure if helping yet.  She has anemia     No Blood  No Fever  No recent ABX or recent travel or new meds  Weight loss of 6 pounds  No components found for: \"LDLCHOLESTEROL\", \"LDLCALC\"    (goal LDL is <100)   AST (U/L)   Date Value   01/05/2024 23     ALT (U/L)   Date Value   01/05/2024 19     BUN (mg/dL)   Date Value   01/05/2024 24 (H)     BP Readings from Last 3 Encounters:   06/26/24 132/70   06/08/23 130/78   10/21/22 116/68          (goal 120/80)    Past Medical History:   Diagnosis Date    Anxiety     Breast cancer (HCC) 1996    left    Cancer (HCC)     breast cancer, left     CKD (chronic kidney disease) stage 3, GFR 30-59 ml/min (HCC)       Past Surgical History:   Procedure Laterality Date    APPENDECTOMY      BREAST LUMPECTOMY      right fatty tissue removed    BREAST REDUCTION SURGERY Right     BREAST SURGERY Left     left mastectomy w/reconstruction, tram    CATARACT REMOVAL Bilateral 03/03/2016    CHOLECYSTECTOMY      COLONOSCOPY      COLONOSCOPY  10/13/2016    Dr Arora    TUBAL LIGATION         Family History   Problem Relation Age of Onset    Other Mother         Alzheimers    Heart Disease Father        Social History     Tobacco Use    Smoking status: Every Day     Current packs/day: 1.00     Average packs/day: 1 pack/day

## 2024-06-26 ENCOUNTER — OFFICE VISIT (OUTPATIENT)
Dept: PRIMARY CARE CLINIC | Age: 79
End: 2024-06-26
Payer: MEDICARE

## 2024-06-26 VITALS
TEMPERATURE: 98.4 F | HEIGHT: 59 IN | OXYGEN SATURATION: 98 % | HEART RATE: 80 BPM | DIASTOLIC BLOOD PRESSURE: 70 MMHG | WEIGHT: 102.6 LBS | BODY MASS INDEX: 20.68 KG/M2 | SYSTOLIC BLOOD PRESSURE: 132 MMHG

## 2024-06-26 DIAGNOSIS — K52.9 CHRONIC DIARRHEA: Primary | ICD-10-CM

## 2024-06-26 DIAGNOSIS — R10.10 PAIN OF UPPER ABDOMEN: ICD-10-CM

## 2024-06-26 DIAGNOSIS — D64.89 ANEMIA DUE TO OTHER CAUSE, NOT CLASSIFIED: ICD-10-CM

## 2024-06-26 DIAGNOSIS — M81.0 MENOPAUSAL OSTEOPOROSIS: ICD-10-CM

## 2024-06-26 DIAGNOSIS — D63.8 ANEMIA OF CHRONIC DISEASE: ICD-10-CM

## 2024-06-26 DIAGNOSIS — K52.9 CHRONIC DIARRHEA: ICD-10-CM

## 2024-06-26 DIAGNOSIS — N18.30 STAGE 3 CHRONIC KIDNEY DISEASE, UNSPECIFIED WHETHER STAGE 3A OR 3B CKD (HCC): ICD-10-CM

## 2024-06-26 LAB
ALBUMIN/GLOBULIN RATIO: 2 (ref 1–2.5)
ALBUMIN: 4.4 G/DL (ref 3.5–5.2)
ALP BLD-CCNC: 106 U/L (ref 35–104)
ALT SERPL-CCNC: 19 U/L (ref 10–35)
ANION GAP SERPL CALCULATED.3IONS-SCNC: 10 MMOL/L (ref 9–16)
AST SERPL-CCNC: 21 U/L (ref 10–35)
BASOPHILS ABSOLUTE: 0.07 K/UL (ref 0–0.2)
BASOPHILS RELATIVE PERCENT: 1 % (ref 0–2)
BILIRUB SERPL-MCNC: 0.2 MG/DL (ref 0–1.2)
BUN BLDV-MCNC: 30 MG/DL (ref 8–23)
C-REACTIVE PROTEIN: <3 MG/L (ref 0–5)
CALCIUM SERPL-MCNC: 9.1 MG/DL (ref 8.6–10.4)
CHLORIDE BLD-SCNC: 106 MMOL/L (ref 98–107)
CO2: 23 MMOL/L (ref 20–31)
CREAT SERPL-MCNC: 1.8 MG/DL (ref 0.5–0.9)
EOSINOPHILS ABSOLUTE: 0.07 K/UL (ref 0–0.44)
EOSINOPHILS RELATIVE PERCENT: 1 % (ref 1–4)
GFR, ESTIMATED: 28 ML/MIN/1.73M2
GLUCOSE BLD-MCNC: 99 MG/DL (ref 74–99)
HCT VFR BLD CALC: 31.8 % (ref 36.3–47.1)
HEMOGLOBIN: 10.2 G/DL (ref 11.9–15.1)
IMMATURE GRANULOCYTES %: 0 %
IMMATURE GRANULOCYTES ABSOLUTE: <0.03 K/UL (ref 0–0.3)
LYMPHOCYTES ABSOLUTE: 3.01 K/UL (ref 1.1–3.7)
LYMPHOCYTES RELATIVE PERCENT: 46 % (ref 24–43)
MCH RBC QN AUTO: 29.7 PG (ref 25.2–33.5)
MCHC RBC AUTO-ENTMCNC: 32.1 G/DL (ref 28.4–34.8)
MCV RBC AUTO: 92.4 FL (ref 82.6–102.9)
MONOCYTES ABSOLUTE: 0.44 K/UL (ref 0.1–1.2)
MONOCYTES RELATIVE PERCENT: 7 % (ref 3–12)
NEUTROPHILS ABSOLUTE: 3 K/UL (ref 1.5–8.1)
NEUTROPHILS RELATIVE PERCENT: 45 % (ref 36–65)
NRBC AUTOMATED: 0 PER 100 WBC
PDW BLD-RTO: 14.6 % (ref 11.8–14.4)
PLATELET # BLD: 221 K/UL (ref 138–453)
PMV BLD AUTO: 10.8 FL (ref 8.1–13.5)
POTASSIUM SERPL-SCNC: 5.7 MMOL/L (ref 3.7–5.3)
RBC # BLD: 3.44 M/UL (ref 3.95–5.11)
RBC # BLD: ABNORMAL 10*6/UL
SED RATE, AUTOMATED: 5 MM/HR (ref 0–30)
SODIUM BLD-SCNC: 139 MMOL/L (ref 136–145)
T4 FREE: 1 NG/DL (ref 0.92–1.68)
TOTAL PROTEIN: 6.9 G/DL (ref 6.6–8.7)
TSH SERPL DL<=0.05 MIU/L-ACNC: 1.58 UIU/ML (ref 0.27–4.2)
WBC # BLD: 6.6 K/UL (ref 3.5–11.3)

## 2024-06-26 PROCEDURE — 1123F ACP DISCUSS/DSCN MKR DOCD: CPT | Performed by: PHYSICIAN ASSISTANT

## 2024-06-26 PROCEDURE — 99214 OFFICE O/P EST MOD 30 MIN: CPT | Performed by: PHYSICIAN ASSISTANT

## 2024-06-26 PROCEDURE — G8399 PT W/DXA RESULTS DOCUMENT: HCPCS | Performed by: PHYSICIAN ASSISTANT

## 2024-06-26 PROCEDURE — 1090F PRES/ABSN URINE INCON ASSESS: CPT | Performed by: PHYSICIAN ASSISTANT

## 2024-06-26 PROCEDURE — G8427 DOCREV CUR MEDS BY ELIG CLIN: HCPCS | Performed by: PHYSICIAN ASSISTANT

## 2024-06-26 PROCEDURE — 4004F PT TOBACCO SCREEN RCVD TLK: CPT | Performed by: PHYSICIAN ASSISTANT

## 2024-06-26 PROCEDURE — G8420 CALC BMI NORM PARAMETERS: HCPCS | Performed by: PHYSICIAN ASSISTANT

## 2024-06-26 RX ORDER — BUSPIRONE HYDROCHLORIDE 10 MG/1
10 TABLET ORAL DAILY
COMMUNITY

## 2024-06-26 RX ORDER — LISINOPRIL 5 MG/1
5 TABLET ORAL DAILY
COMMUNITY

## 2024-06-26 RX ORDER — TIZANIDINE 2 MG/1
TABLET ORAL
COMMUNITY

## 2024-06-26 RX ORDER — CALCITONIN SALMON 200 [IU]/.09ML
SPRAY, METERED NASAL
Qty: 3 EACH | Refills: 3
Start: 2024-06-26

## 2024-06-26 ASSESSMENT — ENCOUNTER SYMPTOMS
SHORTNESS OF BREATH: 0
BLOOD IN STOOL: 0
ABDOMINAL PAIN: 1
VOMITING: 0
ABDOMINAL DISTENTION: 1
NAUSEA: 0

## 2024-06-27 DIAGNOSIS — E87.5 HYPERKALEMIA: Primary | ICD-10-CM

## 2024-06-27 NOTE — RESULT ENCOUNTER NOTE
Thyroid level ok, CBC is stable  Potassium too high and kidney function a little worse.   Decrease potassium foods, recheck potassium level: order in  Does she see a kidney doctor ? Needs to see one.

## 2024-06-28 ENCOUNTER — HOSPITAL ENCOUNTER (OUTPATIENT)
Age: 79
Setting detail: SPECIMEN
Discharge: HOME | End: 2024-06-28

## 2024-06-28 ENCOUNTER — HOSPITAL ENCOUNTER (OUTPATIENT)
Dept: CT IMAGING | Age: 79
End: 2024-06-28
Payer: MEDICARE

## 2024-06-28 DIAGNOSIS — R10.10 PAIN OF UPPER ABDOMEN: ICD-10-CM

## 2024-06-28 DIAGNOSIS — K52.9 CHRONIC DIARRHEA: ICD-10-CM

## 2024-06-28 PROCEDURE — 74176 CT ABD & PELVIS W/O CONTRAST: CPT

## 2024-06-29 LAB
C DIFF GDH + TOXINS A+B STL QL IA.RAPID: NEGATIVE
CAMPYLOBACTER DNA SPEC NAA+PROBE: NORMAL
ETEC ELTA+ESTB GENES STL QL NAA+PROBE: NORMAL
LACTOFERRIN STL QL: NORMAL
P SHIGELLOIDES DNA STL QL NAA+PROBE: NORMAL
SALMONELLA DNA SPEC QL NAA+PROBE: NORMAL
SHIGA TOXIN STX GENE SPEC NAA+PROBE: NORMAL
SHIGELLA DNA SPEC QL NAA+PROBE: NORMAL
SPECIMEN DESCRIPTION: NORMAL
SPECIMEN DESCRIPTION: NORMAL
V CHOL+PARA RFBL+TRKH+TNAA STL QL NAA+PR: NORMAL
Y ENTERO RECN STL QL NAA+PROBE: NORMAL

## 2024-07-01 LAB
C PARVUM AG STL QL IA: NEGATIVE
CALPROTECTIN, FECAL: 42 UG/G
G LAMBLIA AG STL QL IA: NEGATIVE
SOURCE: NORMAL
SPECIMEN DESCRIPTION: NORMAL

## 2024-07-08 ENCOUNTER — OFFICE VISIT (OUTPATIENT)
Dept: PRIMARY CARE CLINIC | Age: 79
End: 2024-07-08
Payer: MEDICARE

## 2024-07-08 VITALS
OXYGEN SATURATION: 98 % | WEIGHT: 100.6 LBS | SYSTOLIC BLOOD PRESSURE: 140 MMHG | BODY MASS INDEX: 20.32 KG/M2 | HEART RATE: 96 BPM | DIASTOLIC BLOOD PRESSURE: 82 MMHG

## 2024-07-08 DIAGNOSIS — M25.512 ACUTE PAIN OF LEFT SHOULDER: Primary | ICD-10-CM

## 2024-07-08 PROCEDURE — 99213 OFFICE O/P EST LOW 20 MIN: CPT | Performed by: STUDENT IN AN ORGANIZED HEALTH CARE EDUCATION/TRAINING PROGRAM

## 2024-07-08 PROCEDURE — G8399 PT W/DXA RESULTS DOCUMENT: HCPCS | Performed by: STUDENT IN AN ORGANIZED HEALTH CARE EDUCATION/TRAINING PROGRAM

## 2024-07-08 PROCEDURE — G8420 CALC BMI NORM PARAMETERS: HCPCS | Performed by: STUDENT IN AN ORGANIZED HEALTH CARE EDUCATION/TRAINING PROGRAM

## 2024-07-08 PROCEDURE — G8427 DOCREV CUR MEDS BY ELIG CLIN: HCPCS | Performed by: STUDENT IN AN ORGANIZED HEALTH CARE EDUCATION/TRAINING PROGRAM

## 2024-07-08 PROCEDURE — 1090F PRES/ABSN URINE INCON ASSESS: CPT | Performed by: STUDENT IN AN ORGANIZED HEALTH CARE EDUCATION/TRAINING PROGRAM

## 2024-07-08 PROCEDURE — 1123F ACP DISCUSS/DSCN MKR DOCD: CPT | Performed by: STUDENT IN AN ORGANIZED HEALTH CARE EDUCATION/TRAINING PROGRAM

## 2024-07-08 PROCEDURE — 4004F PT TOBACCO SCREEN RCVD TLK: CPT | Performed by: STUDENT IN AN ORGANIZED HEALTH CARE EDUCATION/TRAINING PROGRAM

## 2024-07-08 RX ORDER — METHYLPREDNISOLONE 4 MG/1
TABLET ORAL
Qty: 1 KIT | Refills: 0 | Status: SHIPPED | OUTPATIENT
Start: 2024-07-08 | End: 2024-07-08

## 2024-07-08 RX ORDER — METHYLPREDNISOLONE 4 MG/1
TABLET ORAL
Qty: 1 KIT | Refills: 0 | Status: SHIPPED | OUTPATIENT
Start: 2024-07-08 | End: 2024-07-14

## 2024-07-08 ASSESSMENT — ENCOUNTER SYMPTOMS
ABDOMINAL PAIN: 0
VOMITING: 0
DIARRHEA: 1
NAUSEA: 0

## 2024-07-08 NOTE — PROGRESS NOTES
visit.     No Known Allergies    Health Maintenance   Topic Date Due    Respiratory Syncytial Virus (RSV) Pregnant or age 60 yrs+ (1 - 1-dose 60+ series) Never done    COVID-19 Vaccine (4 - 2023-24 season) 09/01/2023    Annual Wellness Visit (Medicare)  02/01/2024    Flu vaccine (1) 08/01/2024    Low dose CT lung screening &/or counseling  12/27/2024    Lipids  01/05/2025    Depression Monitoring  06/25/2025    DTaP/Tdap/Td vaccine (2 - Td or Tdap) 10/01/2025    DEXA (modify frequency per FRAX score)  Completed    Shingles vaccine  Completed    Pneumococcal 65+ years Vaccine  Completed    Hepatitis C screen  Completed    Hepatitis A vaccine  Aged Out    Hepatitis B vaccine  Aged Out    Hib vaccine  Aged Out    Polio vaccine  Aged Out    Meningococcal (ACWY) vaccine  Aged Out    Breast cancer screen  Discontinued    Colonoscopy  Discontinued       Subjective:      Review of Systems   Constitutional:  Negative for chills and fever.   Gastrointestinal:  Positive for diarrhea. Negative for abdominal pain, nausea and vomiting.   Musculoskeletal:  Positive for arthralgias.   Neurological:  Negative for weakness and numbness.       Objective:     BP (!) 140/82   Pulse 96   Wt 45.6 kg (100 lb 9.6 oz)   SpO2 98%   BMI 20.32 kg/m²   Physical Exam  Vitals and nursing note reviewed.   Constitutional:       General: She is not in acute distress.     Appearance: She is not ill-appearing.   HENT:      Head: Atraumatic.   Eyes:      Extraocular Movements: Extraocular movements intact.      Pupils: Pupils are equal, round, and reactive to light.   Cardiovascular:      Rate and Rhythm: Normal rate and regular rhythm.      Heart sounds: No murmur heard.     No friction rub. No gallop.   Pulmonary:      Effort: Pulmonary effort is normal. No respiratory distress.      Breath sounds: Normal breath sounds. No wheezing, rhonchi or rales.   Musculoskeletal:      Right lower leg: No edema.      Left lower leg: No edema.      Comments:

## 2024-07-16 ENCOUNTER — OFFICE VISIT (OUTPATIENT)
Dept: PRIMARY CARE CLINIC | Age: 79
End: 2024-07-16
Payer: MEDICARE

## 2024-07-16 VITALS
HEART RATE: 95 BPM | DIASTOLIC BLOOD PRESSURE: 70 MMHG | OXYGEN SATURATION: 98 % | WEIGHT: 100 LBS | HEIGHT: 59 IN | SYSTOLIC BLOOD PRESSURE: 130 MMHG | BODY MASS INDEX: 20.16 KG/M2

## 2024-07-16 DIAGNOSIS — M81.0 MENOPAUSAL OSTEOPOROSIS: ICD-10-CM

## 2024-07-16 DIAGNOSIS — R19.7 DIARRHEA, UNSPECIFIED TYPE: ICD-10-CM

## 2024-07-16 DIAGNOSIS — N18.30 STAGE 3 CHRONIC KIDNEY DISEASE, UNSPECIFIED WHETHER STAGE 3A OR 3B CKD (HCC): ICD-10-CM

## 2024-07-16 DIAGNOSIS — F33.41 RECURRENT MAJOR DEPRESSIVE DISORDER, IN PARTIAL REMISSION (HCC): ICD-10-CM

## 2024-07-16 DIAGNOSIS — R63.4 WEIGHT LOSS: Primary | ICD-10-CM

## 2024-07-16 PROCEDURE — G8427 DOCREV CUR MEDS BY ELIG CLIN: HCPCS | Performed by: PHYSICIAN ASSISTANT

## 2024-07-16 PROCEDURE — 4004F PT TOBACCO SCREEN RCVD TLK: CPT | Performed by: PHYSICIAN ASSISTANT

## 2024-07-16 PROCEDURE — G8399 PT W/DXA RESULTS DOCUMENT: HCPCS | Performed by: PHYSICIAN ASSISTANT

## 2024-07-16 PROCEDURE — 1123F ACP DISCUSS/DSCN MKR DOCD: CPT | Performed by: PHYSICIAN ASSISTANT

## 2024-07-16 PROCEDURE — G8420 CALC BMI NORM PARAMETERS: HCPCS | Performed by: PHYSICIAN ASSISTANT

## 2024-07-16 PROCEDURE — 99214 OFFICE O/P EST MOD 30 MIN: CPT | Performed by: PHYSICIAN ASSISTANT

## 2024-07-16 PROCEDURE — 1090F PRES/ABSN URINE INCON ASSESS: CPT | Performed by: PHYSICIAN ASSISTANT

## 2024-07-16 RX ORDER — CALCITONIN SALMON 200 [IU]/.09ML
SPRAY, METERED NASAL
Qty: 3 EACH | Refills: 3 | Status: SHIPPED | OUTPATIENT
Start: 2024-07-16

## 2024-07-16 ASSESSMENT — ENCOUNTER SYMPTOMS
COUGH: 0
ABDOMINAL PAIN: 1
BLOOD IN STOOL: 0
VOMITING: 0
EYES NEGATIVE: 1
CONSTIPATION: 0
ANAL BLEEDING: 0
CHOKING: 0
SHORTNESS OF BREATH: 0
RECTAL PAIN: 0
DIARRHEA: 1
NAUSEA: 0
ABDOMINAL DISTENTION: 0
BACK PAIN: 1

## 2024-07-16 NOTE — PROGRESS NOTES
[facility]  [facility] PRIMARY CARE  [address]  [facility] [address]  Dept: [phone]    Pilar Jeong is a 79 y.o. female who presents today for her medical conditions/complaints as noted below.      Chief Complaint   Patient presents with    Weight Loss     Patient is here due to weight loss - patient states she weight 104 x3 weeks ago- having unexplained weight loss - patient states she eats but does not have an appetite- drinking boost supplement      Memory Loss     Patient states her memory loss has subsided -patient states she took pain medication with muscle relaxer due to shoulder pain and believes that made her impaired         HPI:   Weight loss: Lost 8 pounds since 12/27/23. She doesn't feel like eating.     Hemoglobin is low but has been since 2018 per chart. 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 Does take Wellbutrin and Celexa and thinks it helps.  She is taking a trip to [place] for 5 weeks soon.     No components found for: \"LDLCHOLESTEROL\", \"LDLCALC\"    (goal LDL is <100)   AST (U/L)   Date Value   06/26/2024 21     ALT (U/L)   Date Value   06/26/2024 19     BUN (mg/dL)   Date Value   06/26/2024 30 (H)     BP Readings from Last 3 Encounters:   07/16/24 130/70   07/08/24 (!) 140/82   06/26/24 132/70          (goal 120/80)    Past Medical History:   Diagnosis Date    Anxiety     Breast cancer (HCC) 1996    left    Cancer (HCC)     breast cancer, left     CKD (chronic kidney disease) stage 3, GFR 30-59 ml/min (HCC)       Past Surgical History:   Procedure Laterality Date    APPENDECTOMY      BREAST LUMPECTOMY      right fatty tissue removed    BREAST REDUCTION SURGERY Right     BREAST SURGERY Left     left

## 2024-07-17 ENCOUNTER — HOSPITAL ENCOUNTER (OUTPATIENT)
Age: 79
Discharge: HOME | End: 2024-07-19
Payer: MEDICARE

## 2024-07-17 ENCOUNTER — HOSPITAL ENCOUNTER (OUTPATIENT)
Dept: GENERAL RADIOLOGY | Age: 79
Discharge: HOME | End: 2024-07-19
Payer: MEDICARE

## 2024-07-17 ENCOUNTER — TELEPHONE (OUTPATIENT)
Dept: PRIMARY CARE CLINIC | Age: 79
End: 2024-07-17

## 2024-07-17 DIAGNOSIS — R63.4 WEIGHT LOSS: ICD-10-CM

## 2024-07-17 PROCEDURE — 71046 X-RAY EXAM CHEST 2 VIEWS: CPT

## 2024-07-17 PROCEDURE — 74019 RADEX ABDOMEN 2 VIEWS: CPT

## 2024-07-17 NOTE — ASSESSMENT & PLAN NOTE
Borderline controlled, continue current medications, lifestyle modifications recommended, and recommended counseling or support group

## 2024-07-17 NOTE — TELEPHONE ENCOUNTER
Left voicemail asking if patient needed appointment 07/23  Patient was just seen yesterday and appointment f/u stated if symptoms worsen   Dose not look like patient was to f/u in one week

## 2024-07-22 ASSESSMENT — ENCOUNTER SYMPTOMS
ABDOMINAL PAIN: 0
COUGH: 0
SHORTNESS OF BREATH: 0
DIARRHEA: 1
EYE DISCHARGE: 0
WHEEZING: 0
SORE THROAT: 0
VOMITING: 0
EYE REDNESS: 0
RHINORRHEA: 0
NAUSEA: 0

## 2024-07-22 NOTE — PROGRESS NOTES
[facility]  [facility] PRIMARY CARE  [address]  [place] [facility] [address]  Dept: [phone]    Pilar Jeong is a 79 y.o. female who presents today for her medical conditions/complaints as noted below.      Chief Complaint   Patient presents with    Results     Patient is her to discuss results - patient states appetite is not good and still has diarrhea- last colonoscopy was 2016 - patient states dietician states no openings - lost 2lb since last visit        HPI:     HPI  CXR stills shows gaseousness in bowel likely mild ileus.    Lost another 2 pounds in 7 days  No nausea, vomiting. Appetite is poor  Diarrhea continues --soft brown, no blood, No fever  Hemoglobin: 10.2 stable--chronic kidney disease--sees specialist in [place]  Not taking narcotic meds or baclofen   Had a day that her legs only looked yellow but it was only one day  No components found for: \"LDLCHOLESTEROL\", \"LDLCALC\"    (goal LDL is <100)   AST (U/L)   Date Value   06/26/2024 21     ALT (U/L)   Date Value   06/26/2024 19     BUN (mg/dL)   Date Value   06/26/2024 30 (H)     BP Readings from Last 3 Encounters:   07/23/24 118/70   07/16/24 130/70   07/08/24 (!) 140/82          (goal 120/80)    Past Medical History:   Diagnosis Date    Anxiety     Breast cancer (HCC) 1996    left    Cancer (HCC)     breast cancer, left     CKD (chronic kidney disease) stage 3, GFR 30-59 ml/min (HCC)       Past Surgical History:   Procedure Laterality Date    APPENDECTOMY      BREAST LUMPECTOMY      right fatty tissue removed    BREAST REDUCTION SURGERY Right     BREAST SURGERY Left     left mastectomy w/reconstruction, tram    CATARACT REMOVAL Bilateral 03/03/2016    CHOLECYSTECTOMY      COLONOSCOPY      COLONOSCOPY  10/13/2016    Dr Arora    TUBAL LIGATION         Family History   Problem Relation Age of Onset    Other Mother         Alzheimers    Heart Disease Father        Social History     Tobacco Use    Smoking status:

## 2024-07-23 ENCOUNTER — OFFICE VISIT (OUTPATIENT)
Dept: PRIMARY CARE CLINIC | Age: 79
End: 2024-07-23
Payer: MEDICARE

## 2024-07-23 VITALS
SYSTOLIC BLOOD PRESSURE: 118 MMHG | OXYGEN SATURATION: 97 % | DIASTOLIC BLOOD PRESSURE: 70 MMHG | HEIGHT: 59 IN | WEIGHT: 98.6 LBS | HEART RATE: 93 BPM | BODY MASS INDEX: 19.88 KG/M2

## 2024-07-23 DIAGNOSIS — R63.4 WEIGHT LOSS: ICD-10-CM

## 2024-07-23 DIAGNOSIS — K56.7 ILEUS, UNSPECIFIED (HCC): ICD-10-CM

## 2024-07-23 DIAGNOSIS — R63.0 POOR APPETITE: ICD-10-CM

## 2024-07-23 DIAGNOSIS — R19.7 DIARRHEA, UNSPECIFIED TYPE: Primary | ICD-10-CM

## 2024-07-23 PROCEDURE — G8420 CALC BMI NORM PARAMETERS: HCPCS | Performed by: PHYSICIAN ASSISTANT

## 2024-07-23 PROCEDURE — 1090F PRES/ABSN URINE INCON ASSESS: CPT | Performed by: PHYSICIAN ASSISTANT

## 2024-07-23 PROCEDURE — G8399 PT W/DXA RESULTS DOCUMENT: HCPCS | Performed by: PHYSICIAN ASSISTANT

## 2024-07-23 PROCEDURE — G8427 DOCREV CUR MEDS BY ELIG CLIN: HCPCS | Performed by: PHYSICIAN ASSISTANT

## 2024-07-23 PROCEDURE — 1123F ACP DISCUSS/DSCN MKR DOCD: CPT | Performed by: PHYSICIAN ASSISTANT

## 2024-07-23 PROCEDURE — 99214 OFFICE O/P EST MOD 30 MIN: CPT | Performed by: PHYSICIAN ASSISTANT

## 2024-07-23 PROCEDURE — 4004F PT TOBACCO SCREEN RCVD TLK: CPT | Performed by: PHYSICIAN ASSISTANT

## 2024-07-23 ASSESSMENT — ENCOUNTER SYMPTOMS
BLOOD IN STOOL: 0
ABDOMINAL DISTENTION: 0

## 2024-08-01 ENCOUNTER — APPOINTMENT (RX ONLY)
Dept: URBAN - METROPOLITAN AREA CLINIC 333 | Facility: CLINIC | Age: 79
Setting detail: DERMATOLOGY
End: 2024-08-01

## 2024-08-01 DIAGNOSIS — L98.8 OTHER SPECIFIED DISORDERS OF THE SKIN AND SUBCUTANEOUS TISSUE: ICD-10-CM

## 2024-08-01 PROCEDURE — ? ADDITIONAL NOTES

## 2024-08-01 PROCEDURE — ? COSMETIC CONSULTATION: COOLPEEL

## 2024-08-01 NOTE — PROCEDURE: ADDITIONAL NOTES
Render Risk Assessment In Note?: no
Detail Level: Simple
Additional Notes: Patient was quoted today $2700 for 3 treatments and the post care kit included if she purchases the package treatment or $900 per every treatment and that does not include the post care kit.

## 2024-08-13 ENCOUNTER — DASHBOARD ENCOUNTERS (OUTPATIENT)
Age: 79
End: 2024-08-13

## 2024-08-13 ENCOUNTER — OFFICE VISIT (OUTPATIENT)
Dept: URBAN - METROPOLITAN AREA CLINIC 7 | Facility: CLINIC | Age: 79
End: 2024-08-13
Payer: MEDICARE

## 2024-08-13 VITALS
DIASTOLIC BLOOD PRESSURE: 70 MMHG | RESPIRATION RATE: 16 BRPM | WEIGHT: 97 LBS | TEMPERATURE: 97.7 F | BODY MASS INDEX: 19.56 KG/M2 | SYSTOLIC BLOOD PRESSURE: 120 MMHG | HEIGHT: 59 IN

## 2024-08-13 DIAGNOSIS — Z86.010 HISTORY OF COLON POLYPS: ICD-10-CM

## 2024-08-13 DIAGNOSIS — R10.84 ABDOMINAL PAIN, GENERALIZED: ICD-10-CM

## 2024-08-13 DIAGNOSIS — R19.7 DIARRHEA, UNSPECIFIED TYPE: ICD-10-CM

## 2024-08-13 PROCEDURE — 99204 OFFICE O/P NEW MOD 45 MIN: CPT | Performed by: INTERNAL MEDICINE

## 2024-08-13 RX ORDER — CALCITONIN SALMON 200 [IU]/1
USE 1 SPRAY INTO THE NOSTRIL EVERY DAY SPRAY, METERED NASAL
Qty: 3.7 MILLILITER | Refills: 0 | Status: ACTIVE | COMMUNITY

## 2024-08-13 RX ORDER — LOSARTAN POTASSIUM 25 MG/1
TAKE 1 TABLET BY MOUTH EVERY DAY TABLET, FILM COATED ORAL
Qty: 30 EACH | Refills: 2 | Status: DISCONTINUED | COMMUNITY

## 2024-08-13 RX ORDER — BUSPIRONE HYDROCHLORIDE 10 MG/1
TAKE 1 TABLET BY MOUTH EVERY DAY TABLET ORAL
Qty: 90 EACH | Refills: 0 | Status: ACTIVE | COMMUNITY

## 2024-08-13 RX ORDER — HYDROCODONE BITARTRATE AND ACETAMINOPHEN 5; 325 MG/1; MG/1
TABLET ORAL
Qty: 90 TABLET | Status: ACTIVE | COMMUNITY

## 2024-08-13 RX ORDER — BUPROPION HYDROCHLORIDE 300 MG/1
TAKE 1 TABLET BY MOUTH EVERY DAY TABLET, EXTENDED RELEASE ORAL
Qty: 90 EACH | Refills: 0 | Status: ACTIVE | COMMUNITY

## 2024-08-13 RX ORDER — LISINOPRIL 5 MG/1
TABLET ORAL
Qty: 90 TABLET | Status: ACTIVE | COMMUNITY

## 2024-08-13 RX ORDER — ATORVASTATIN CALCIUM 40 MG/1
TAKE 1 TABLET BY MOUTH EVERY DAY TABLET, FILM COATED ORAL
Qty: 90 EACH | Refills: 0 | Status: ACTIVE | COMMUNITY

## 2024-08-13 NOTE — HPI-TODAY'S VISIT:
New to the practice. Eval for abd pain, diarrhea. She has been having issues with diarrhea for some time. Actually had eval in [place]. Belly pain as well. Xray showed diffuse ileus. Last colon in 2016. Diarrhea has been going on for a long time, has been losing weight as well. Down 13 lbs. Having 3-4 BMs per day, liquid, no bleeding. CT without contrast 7/2024 with lots of gas/distention in small bowel/colon, no enlarged lymph nodes, cdiff neg in 6/2024, neg lactoferrin, calprotectin 42, giardia/crypto negative, Hgb 10 for several years, Cr 1.45. Imodium does help. Hx of colon polyps.

## 2024-08-16 ENCOUNTER — CLAIMS CREATED FROM THE CLAIM WINDOW (OUTPATIENT)
Dept: URBAN - METROPOLITAN AREA CLINIC 4 | Facility: CLINIC | Age: 79
End: 2024-08-16
Payer: MEDICARE

## 2024-08-16 ENCOUNTER — OFFICE VISIT (OUTPATIENT)
Dept: URBAN - METROPOLITAN AREA SURGERY CENTER 5 | Facility: SURGERY CENTER | Age: 79
End: 2024-08-16
Payer: MEDICARE

## 2024-08-16 DIAGNOSIS — Z86.010 ADENOMAS PERSONAL HISTORY OF COLONIC POLYPS: ICD-10-CM

## 2024-08-16 DIAGNOSIS — Q43.8 OTHER SPECIFIED CONGENITAL MALFORMATIONS OF INTESTINE: ICD-10-CM

## 2024-08-16 DIAGNOSIS — K57.30 DIVERTICULOSIS OF LARGE INTESTINE WITHOUT PERFORATION OR ABSCESS WITHOUT BLEEDING: ICD-10-CM

## 2024-08-16 DIAGNOSIS — Z86.010 PERSONAL HISTORY OF COLONIC POLYPS: ICD-10-CM

## 2024-08-16 DIAGNOSIS — K63.5 POLYP OF ASCENDING COLON, UNSPECIFIED TYPE: ICD-10-CM

## 2024-08-16 DIAGNOSIS — Q43.8 TORTUOUS COLON: ICD-10-CM

## 2024-08-16 DIAGNOSIS — K63.89 OTHER SPECIFIED DISEASES OF INTESTINE: ICD-10-CM

## 2024-08-16 DIAGNOSIS — K57.30 DIVERTICULOSIS OF COLON: ICD-10-CM

## 2024-08-16 DIAGNOSIS — Z86.010 HISTORY OF ADENOMATOUS POLYP OF COLON: ICD-10-CM

## 2024-08-16 DIAGNOSIS — K64.8 OTHER HEMORRHOIDS: ICD-10-CM

## 2024-08-16 DIAGNOSIS — Z09 ENCOUNTER FOR COLONOSCOPY FOLLOWING COLON POLYP REMOVAL: ICD-10-CM

## 2024-08-16 DIAGNOSIS — D12.2 BENIGN NEOPLASM OF ASCENDING COLON: ICD-10-CM

## 2024-08-16 DIAGNOSIS — D12.3 BENIGN NEOPLASM OF TRANSVERSE COLON: ICD-10-CM

## 2024-08-16 PROCEDURE — 45381 COLONOSCOPY SUBMUCOUS NJX: CPT | Performed by: CLINIC/CENTER

## 2024-08-16 PROCEDURE — 45385 COLONOSCOPY W/LESION REMOVAL: CPT | Performed by: INTERNAL MEDICINE

## 2024-08-16 PROCEDURE — 45380 COLONOSCOPY AND BIOPSY: CPT | Performed by: INTERNAL MEDICINE

## 2024-08-16 PROCEDURE — 45381 COLONOSCOPY SUBMUCOUS NJX: CPT | Performed by: INTERNAL MEDICINE

## 2024-08-16 PROCEDURE — 88305 TISSUE EXAM BY PATHOLOGIST: CPT | Performed by: PATHOLOGY

## 2024-08-16 PROCEDURE — 00811 ANES LWR INTST NDSC NOS: CPT | Performed by: NURSE ANESTHETIST, CERTIFIED REGISTERED

## 2024-08-16 PROCEDURE — 45385 COLONOSCOPY W/LESION REMOVAL: CPT | Performed by: CLINIC/CENTER

## 2024-08-16 PROCEDURE — 45380 COLONOSCOPY AND BIOPSY: CPT | Performed by: CLINIC/CENTER

## 2024-08-16 RX ORDER — HYDROCODONE BITARTRATE AND ACETAMINOPHEN 5; 325 MG/1; MG/1
TABLET ORAL
Qty: 90 TABLET | Status: ACTIVE | COMMUNITY

## 2024-08-16 RX ORDER — BUSPIRONE HYDROCHLORIDE 10 MG/1
TAKE 1 TABLET BY MOUTH EVERY DAY TABLET ORAL
Qty: 90 EACH | Refills: 0 | Status: ACTIVE | COMMUNITY

## 2024-08-16 RX ORDER — ATORVASTATIN CALCIUM 40 MG/1
TAKE 1 TABLET BY MOUTH EVERY DAY TABLET, FILM COATED ORAL
Qty: 90 EACH | Refills: 0 | Status: ACTIVE | COMMUNITY

## 2024-08-16 RX ORDER — LISINOPRIL 5 MG/1
TABLET ORAL
Qty: 90 TABLET | Status: ACTIVE | COMMUNITY

## 2024-08-16 RX ORDER — CALCITONIN SALMON 200 [IU]/1
USE 1 SPRAY INTO THE NOSTRIL EVERY DAY SPRAY, METERED NASAL
Qty: 3.7 MILLILITER | Refills: 0 | Status: ACTIVE | COMMUNITY

## 2024-08-16 RX ORDER — BUPROPION HYDROCHLORIDE 300 MG/1
TAKE 1 TABLET BY MOUTH EVERY DAY TABLET, EXTENDED RELEASE ORAL
Qty: 90 EACH | Refills: 0 | Status: ACTIVE | COMMUNITY

## 2024-08-20 ENCOUNTER — LAB OUTSIDE AN ENCOUNTER (OUTPATIENT)
Dept: URBAN - METROPOLITAN AREA CLINIC 7 | Facility: CLINIC | Age: 79
End: 2024-08-20

## 2024-08-23 ENCOUNTER — TELEPHONE ENCOUNTER (OUTPATIENT)
Dept: URBAN - METROPOLITAN AREA CLINIC 7 | Facility: CLINIC | Age: 79
End: 2024-08-23

## 2024-09-06 ENCOUNTER — OFFICE VISIT (OUTPATIENT)
Dept: URBAN - METROPOLITAN AREA CLINIC 9 | Facility: CLINIC | Age: 79
End: 2024-09-06

## 2024-12-16 ENCOUNTER — TELEPHONE (OUTPATIENT)
Dept: PRIMARY CARE CLINIC | Age: 79
End: 2024-12-16

## 2024-12-16 DIAGNOSIS — Z12.31 ENCOUNTER FOR SCREENING MAMMOGRAM FOR BREAST CANCER: Primary | ICD-10-CM

## 2024-12-16 DIAGNOSIS — F17.210 CIGARETTE NICOTINE DEPENDENCE WITHOUT COMPLICATION: ICD-10-CM

## 2024-12-16 NOTE — TELEPHONE ENCOUNTER
Patient is requesting an order for a mammogram and CT lung screen. Patient will have testing done at [facility]. Orders pended for review, please call patient back so she can schedule.

## 2024-12-30 ENCOUNTER — HOSPITAL ENCOUNTER (OUTPATIENT)
Dept: WOMENS IMAGING | Age: 79
Discharge: HOME | End: 2025-01-01
Payer: MEDICARE

## 2024-12-30 ENCOUNTER — HOSPITAL ENCOUNTER (OUTPATIENT)
Dept: CT IMAGING | Age: 79
Discharge: HOME | End: 2025-01-01
Payer: MEDICARE

## 2024-12-30 VITALS — HEIGHT: 59 IN | BODY MASS INDEX: 19.76 KG/M2 | WEIGHT: 98 LBS

## 2024-12-30 DIAGNOSIS — F17.210 CIGARETTE NICOTINE DEPENDENCE WITHOUT COMPLICATION: ICD-10-CM

## 2024-12-30 DIAGNOSIS — Z12.31 ENCOUNTER FOR SCREENING MAMMOGRAM FOR BREAST CANCER: ICD-10-CM

## 2024-12-30 PROCEDURE — 71271 CT THORAX LUNG CANCER SCR C-: CPT

## 2024-12-30 PROCEDURE — 77063 BREAST TOMOSYNTHESIS BI: CPT

## 2024-12-31 ENCOUNTER — OFFICE VISIT (OUTPATIENT)
Dept: PRIMARY CARE CLINIC | Age: 79
End: 2024-12-31
Payer: MEDICARE

## 2024-12-31 ENCOUNTER — TELEPHONE (OUTPATIENT)
Dept: PRIMARY CARE CLINIC | Age: 79
End: 2024-12-31

## 2024-12-31 VITALS
TEMPERATURE: 98.2 F | OXYGEN SATURATION: 98 % | HEART RATE: 90 BPM | BODY MASS INDEX: 19.11 KG/M2 | HEIGHT: 59 IN | WEIGHT: 94.8 LBS | SYSTOLIC BLOOD PRESSURE: 138 MMHG | DIASTOLIC BLOOD PRESSURE: 80 MMHG

## 2024-12-31 DIAGNOSIS — N18.30 STAGE 3 CHRONIC KIDNEY DISEASE, UNSPECIFIED WHETHER STAGE 3A OR 3B CKD (HCC): ICD-10-CM

## 2024-12-31 DIAGNOSIS — D50.9 IRON DEFICIENCY ANEMIA, UNSPECIFIED IRON DEFICIENCY ANEMIA TYPE: ICD-10-CM

## 2024-12-31 DIAGNOSIS — F41.9 ANXIETY: Chronic | ICD-10-CM

## 2024-12-31 DIAGNOSIS — Z23 NEED FOR VACCINATION: ICD-10-CM

## 2024-12-31 DIAGNOSIS — K59.1 FUNCTIONAL DIARRHEA: Primary | ICD-10-CM

## 2024-12-31 DIAGNOSIS — K59.1 FUNCTIONAL DIARRHEA: ICD-10-CM

## 2024-12-31 LAB
ANION GAP SERPL CALCULATED.3IONS-SCNC: 8 MMOL/L (ref 9–16)
BASOPHILS ABSOLUTE: 0.07 K/UL (ref 0–0.2)
BASOPHILS RELATIVE PERCENT: 1 % (ref 0–2)
BUN BLDV-MCNC: 14 MG/DL (ref 8–23)
CALCIUM SERPL-MCNC: 9.5 MG/DL (ref 8.6–10.4)
CHLORIDE BLD-SCNC: 108 MMOL/L (ref 98–107)
CO2: 25 MMOL/L (ref 20–31)
CREAT SERPL-MCNC: 1.3 MG/DL (ref 0.6–0.9)
EOSINOPHILS ABSOLUTE: 0.11 K/UL (ref 0–0.44)
EOSINOPHILS RELATIVE PERCENT: 2 % (ref 1–4)
FERRITIN: 450 NG/ML
GFR, ESTIMATED: 42 ML/MIN/1.73M2
GLUCOSE BLD-MCNC: 99 MG/DL (ref 74–99)
HCT VFR BLD CALC: 38.9 % (ref 36.3–47.1)
HEMOGLOBIN: 12.5 G/DL (ref 11.9–15.1)
IMMATURE GRANULOCYTES %: 0 %
IMMATURE GRANULOCYTES ABSOLUTE: <0.03 K/UL (ref 0–0.3)
IRON % SATURATION: 38 % (ref 20–55)
IRON: 80 UG/DL (ref 37–145)
LYMPHOCYTES ABSOLUTE: 2.97 K/UL (ref 1.1–3.7)
LYMPHOCYTES RELATIVE PERCENT: 40 % (ref 24–43)
MCH RBC QN AUTO: 29.6 PG (ref 25.2–33.5)
MCHC RBC AUTO-ENTMCNC: 32.1 G/DL (ref 28.4–34.8)
MCV RBC AUTO: 92.2 FL (ref 82.6–102.9)
MONOCYTES ABSOLUTE: 0.49 K/UL (ref 0.1–1.2)
MONOCYTES RELATIVE PERCENT: 7 % (ref 3–12)
NEUTROPHILS ABSOLUTE: 3.87 K/UL (ref 1.5–8.1)
NEUTROPHILS RELATIVE PERCENT: 50 % (ref 36–65)
NRBC AUTOMATED: 0 PER 100 WBC
PDW BLD-RTO: 15.4 % (ref 11.8–14.4)
PLATELET # BLD: 217 K/UL (ref 138–453)
PMV BLD AUTO: 11 FL (ref 8.1–13.5)
POTASSIUM SERPL-SCNC: 4.5 MMOL/L (ref 3.7–5.3)
RBC # BLD: 4.22 M/UL (ref 3.95–5.11)
RBC # BLD: ABNORMAL 10*6/UL
SODIUM BLD-SCNC: 141 MMOL/L (ref 136–145)
TOTAL IRON BINDING CAPACITY: 211 UG/DL (ref 250–450)
UNSATURATED IRON BINDING CAPACITY: 131 UG/DL (ref 112–347)
WBC # BLD: 7.5 K/UL (ref 3.5–11.3)

## 2024-12-31 PROCEDURE — G8420 CALC BMI NORM PARAMETERS: HCPCS | Performed by: PHYSICIAN ASSISTANT

## 2024-12-31 PROCEDURE — 99214 OFFICE O/P EST MOD 30 MIN: CPT | Performed by: PHYSICIAN ASSISTANT

## 2024-12-31 PROCEDURE — 90653 IIV ADJUVANT VACCINE IM: CPT | Performed by: PHYSICIAN ASSISTANT

## 2024-12-31 PROCEDURE — 1090F PRES/ABSN URINE INCON ASSESS: CPT | Performed by: PHYSICIAN ASSISTANT

## 2024-12-31 PROCEDURE — G8399 PT W/DXA RESULTS DOCUMENT: HCPCS | Performed by: PHYSICIAN ASSISTANT

## 2024-12-31 PROCEDURE — G8482 FLU IMMUNIZE ORDER/ADMIN: HCPCS | Performed by: PHYSICIAN ASSISTANT

## 2024-12-31 PROCEDURE — 1123F ACP DISCUSS/DSCN MKR DOCD: CPT | Performed by: PHYSICIAN ASSISTANT

## 2024-12-31 PROCEDURE — G0008 ADMIN INFLUENZA VIRUS VAC: HCPCS | Performed by: PHYSICIAN ASSISTANT

## 2024-12-31 PROCEDURE — 1159F MED LIST DOCD IN RCRD: CPT | Performed by: PHYSICIAN ASSISTANT

## 2024-12-31 PROCEDURE — 4004F PT TOBACCO SCREEN RCVD TLK: CPT | Performed by: PHYSICIAN ASSISTANT

## 2024-12-31 PROCEDURE — G8427 DOCREV CUR MEDS BY ELIG CLIN: HCPCS | Performed by: PHYSICIAN ASSISTANT

## 2024-12-31 RX ORDER — CHOLESTYRAMINE 4 G/9G
1 POWDER, FOR SUSPENSION ORAL DAILY
Qty: 90 PACKET | Refills: 0 | Status: SHIPPED | OUTPATIENT
Start: 2024-12-31

## 2024-12-31 RX ORDER — BUSPIRONE HYDROCHLORIDE 10 MG/1
10 TABLET ORAL 3 TIMES DAILY PRN
Qty: 90 TABLET | Refills: 1 | Status: SHIPPED | OUTPATIENT
Start: 2024-12-31

## 2024-12-31 RX ORDER — SODIUM BICARBONATE 650 MG/1
1 TABLET ORAL DAILY
COMMUNITY

## 2024-12-31 ASSESSMENT — ENCOUNTER SYMPTOMS
EYE DISCHARGE: 0
WHEEZING: 0
COUGH: 0
SHORTNESS OF BREATH: 0
NAUSEA: 0
RHINORRHEA: 0
ABDOMINAL PAIN: 0
CONSTIPATION: 0
ABDOMINAL DISTENTION: 0
DIARRHEA: 1
EYE REDNESS: 0
VOMITING: 0
BLOOD IN STOOL: 0
SORE THROAT: 0

## 2024-12-31 NOTE — PROGRESS NOTES
[facility]  [place] [facility]IMARY [facility]ARE  [address]  [place] [facility] [facility] [address]  Dept: [phone]    Pilar Jeong is a 79 y.o. female who presents today for her medical conditions/complaints as noted below.      Chief Complaint   Patient presents with    Follow-up     Patient is for a recheck on bowel movement - patient states she is still having diarrhea   Patient had a colonoscopy in September and October in [place]   Dr. Ragland and Benjamin Delaney        HPI:     HPI  Weight: down again   GI symptoms: still having diarrhea off and on now and can go 4-5 times a day when it happens. Will use immodium to go places. Takes a probiotic    Has gotten iron infusions now with hematology and anemia and iron levels are improved as of Nov BW  Had mammo and LDCT yesterday.   No components found for: \"LDLCHOLESTEROL\", \"LDLCALC\"    (goal LDL is <100)   AST (U/L)   Date Value   06/26/2024 21     ALT (U/L)   Date Value   06/26/2024 19     BUN (mg/dL)   Date Value   06/26/2024 30 (H)     BP Readings from Last 3 Encounters:   12/31/24 138/80   07/23/24 118/70   07/16/24 130/70          (goal 120/80)    Past Medical History:   Diagnosis Date    Anxiety     Breast cancer (HCC) 1996    left    Cancer (HCC)     breast cancer, left     CKD (chronic kidney disease) stage 3, GFR 30-59 ml/min (HCC)       Past Surgical History:   Procedure Laterality Date    APPENDECTOMY      BREAST LUMPECTOMY      right fatty tissue removed    BREAST REDUCTION SURGERY Right     BREAST SURGERY Left     left mastectomy w/reconstruction, tram    CATARACT REMOVAL Bilateral 03/03/2016    CHOLECYSTECTOMY      COLONOSCOPY      COLONOSCOPY  10/13/2016    Dr Arora    TUBAL LIGATION         Family History   Problem Relation Age of Onset    Other Mother         Alzheimers    Heart Disease Father        Social History     Tobacco Use    Smoking status: Every Day     Current packs/day: 1.00     Average packs/day: 1 pack/day

## 2025-03-14 ENCOUNTER — P2P PATIENT RECORD (OUTPATIENT)
Age: 80
End: 2025-03-14

## 2025-04-04 DIAGNOSIS — F41.9 ANXIETY: Chronic | ICD-10-CM

## 2025-04-04 RX ORDER — BUSPIRONE HYDROCHLORIDE 10 MG/1
10 TABLET ORAL 3 TIMES DAILY PRN
Qty: 90 TABLET | Refills: 1 | Status: SHIPPED | OUTPATIENT
Start: 2025-04-04

## 2025-05-20 ENCOUNTER — APPOINTMENT (OUTPATIENT)
Dept: URBAN - METROPOLITAN AREA CLINIC 148 | Facility: CLINIC | Age: 80
Setting detail: DERMATOLOGY
End: 2025-05-20

## 2025-05-20 DIAGNOSIS — L81.4 OTHER MELANIN HYPERPIGMENTATION: ICD-10-CM

## 2025-05-20 DIAGNOSIS — D22 MELANOCYTIC NEVI: ICD-10-CM

## 2025-05-20 DIAGNOSIS — D69.2 OTHER NONTHROMBOCYTOPENIC PURPURA: ICD-10-CM

## 2025-05-20 DIAGNOSIS — L57.0 ACTINIC KERATOSIS: ICD-10-CM

## 2025-05-20 DIAGNOSIS — L82.1 OTHER SEBORRHEIC KERATOSIS: ICD-10-CM

## 2025-05-20 DIAGNOSIS — D18.0 HEMANGIOMA: ICD-10-CM

## 2025-05-20 PROBLEM — D18.01 HEMANGIOMA OF SKIN AND SUBCUTANEOUS TISSUE: Status: ACTIVE | Noted: 2025-05-20

## 2025-05-20 PROBLEM — D22.61 MELANOCYTIC NEVI OF RIGHT UPPER LIMB, INCLUDING SHOULDER: Status: ACTIVE | Noted: 2025-05-20

## 2025-05-20 PROBLEM — D22.62 MELANOCYTIC NEVI OF LEFT UPPER LIMB, INCLUDING SHOULDER: Status: ACTIVE | Noted: 2025-05-20

## 2025-05-20 PROBLEM — D22.5 MELANOCYTIC NEVI OF TRUNK: Status: ACTIVE | Noted: 2025-05-20

## 2025-05-20 PROCEDURE — ? LIQUID NITROGEN

## 2025-05-20 PROCEDURE — 99213 OFFICE O/P EST LOW 20 MIN: CPT | Mod: 25

## 2025-05-20 PROCEDURE — 17000 DESTRUCT PREMALG LESION: CPT

## 2025-05-20 PROCEDURE — ? COUNSELING

## 2025-05-20 PROCEDURE — ? SUNSCREEN RECOMMENDATIONS

## 2025-05-20 PROCEDURE — ? FULL BODY SKIN EXAM

## 2025-05-20 ASSESSMENT — LOCATION SIMPLE DESCRIPTION DERM
LOCATION SIMPLE: LEFT PRETIBIAL REGION
LOCATION SIMPLE: LEFT THIGH
LOCATION SIMPLE: RIGHT PRETIBIAL REGION
LOCATION SIMPLE: RIGHT UPPER ARM
LOCATION SIMPLE: LEFT UPPER ARM
LOCATION SIMPLE: RIGHT UPPER BACK
LOCATION SIMPLE: ABDOMEN
LOCATION SIMPLE: NOSE
LOCATION SIMPLE: RIGHT LOWER BACK
LOCATION SIMPLE: RIGHT FOREARM
LOCATION SIMPLE: RIGHT POSTERIOR UPPER ARM
LOCATION SIMPLE: LEFT POSTERIOR UPPER ARM
LOCATION SIMPLE: LEFT FOREARM
LOCATION SIMPLE: UPPER BACK
LOCATION SIMPLE: RIGHT THIGH

## 2025-05-20 ASSESSMENT — LOCATION DETAILED DESCRIPTION DERM
LOCATION DETAILED: PERIUMBILICAL SKIN
LOCATION DETAILED: LEFT PROXIMAL PRETIBIAL REGION
LOCATION DETAILED: RIGHT PROXIMAL PRETIBIAL REGION
LOCATION DETAILED: RIGHT ANTERIOR PROXIMAL THIGH
LOCATION DETAILED: INFERIOR THORACIC SPINE
LOCATION DETAILED: RIGHT DISTAL DORSAL FOREARM
LOCATION DETAILED: RIGHT SUPERIOR MEDIAL MIDBACK
LOCATION DETAILED: LEFT PROXIMAL DORSAL FOREARM
LOCATION DETAILED: LEFT ANTERIOR DISTAL THIGH
LOCATION DETAILED: SUPERIOR THORACIC SPINE
LOCATION DETAILED: RIGHT DISTAL POSTERIOR UPPER ARM
LOCATION DETAILED: LEFT DISTAL POSTERIOR UPPER ARM
LOCATION DETAILED: RIGHT ANTERIOR DISTAL UPPER ARM
LOCATION DETAILED: LEFT ANTERIOR PROXIMAL UPPER ARM
LOCATION DETAILED: RIGHT MEDIAL UPPER BACK
LOCATION DETAILED: LEFT NASAL DORSUM

## 2025-05-20 ASSESSMENT — LOCATION ZONE DERM
LOCATION ZONE: NOSE
LOCATION ZONE: ARM
LOCATION ZONE: TRUNK
LOCATION ZONE: LEG

## 2025-05-20 NOTE — PROCEDURE: LIQUID NITROGEN
Post-Care Instructions: I reviewed with the patient in detail post-care instructions. Patient is to wear sunprotection, and avoid picking at any of the treated lesions. Pt may apply Vaseline to crusted or scabbing areas.
Duration Of Freeze Thaw-Cycle (Seconds): 2
Number Of Freeze-Thaw Cycles: 2 freeze-thaw cycles
Show Aperture Variable?: Yes
Consent: The patient's consent was obtained including but not limited to risks of crusting, scabbing, blistering, scarring, darker or lighter pigmentary change, recurrence, incomplete removal and infection.
Detail Level: Zone
Render Note In Bullet Format When Appropriate: No

## 2025-05-23 ENCOUNTER — TELEPHONE (OUTPATIENT)
Dept: PRIMARY CARE CLINIC | Age: 80
End: 2025-05-23